# Patient Record
Sex: FEMALE | Race: WHITE | NOT HISPANIC OR LATINO | URBAN - METROPOLITAN AREA
[De-identification: names, ages, dates, MRNs, and addresses within clinical notes are randomized per-mention and may not be internally consistent; named-entity substitution may affect disease eponyms.]

---

## 2017-05-29 ENCOUNTER — EMERGENCY (EMERGENCY)
Facility: HOSPITAL | Age: 52
LOS: 1 days | End: 2017-05-29
Payer: MEDICAID

## 2017-05-29 PROCEDURE — 71275 CT ANGIOGRAPHY CHEST: CPT | Mod: 26

## 2017-05-29 PROCEDURE — 71020: CPT | Mod: 26

## 2017-05-29 PROCEDURE — 99285 EMERGENCY DEPT VISIT HI MDM: CPT

## 2018-04-01 ENCOUNTER — OUTPATIENT (OUTPATIENT)
Dept: OUTPATIENT SERVICES | Facility: HOSPITAL | Age: 53
LOS: 1 days | End: 2018-04-01
Payer: MEDICAID

## 2018-04-01 PROCEDURE — G9001: CPT

## 2018-04-17 DIAGNOSIS — R69 ILLNESS, UNSPECIFIED: ICD-10-CM

## 2020-05-19 PROBLEM — Z00.00 ENCOUNTER FOR PREVENTIVE HEALTH EXAMINATION: Status: ACTIVE | Noted: 2020-05-19

## 2020-05-20 ENCOUNTER — APPOINTMENT (OUTPATIENT)
Dept: UROLOGY | Facility: CLINIC | Age: 55
End: 2020-05-20
Payer: MEDICAID

## 2020-05-20 VITALS
RESPIRATION RATE: 15 BRPM | HEART RATE: 110 BPM | TEMPERATURE: 97.9 F | WEIGHT: 134 LBS | HEIGHT: 64 IN | SYSTOLIC BLOOD PRESSURE: 159 MMHG | DIASTOLIC BLOOD PRESSURE: 89 MMHG | BODY MASS INDEX: 22.88 KG/M2

## 2020-05-20 DIAGNOSIS — F17.200 NICOTINE DEPENDENCE, UNSPECIFIED, UNCOMPLICATED: ICD-10-CM

## 2020-05-20 DIAGNOSIS — Z80.59 FAMILY HISTORY OF MALIGNANT NEOPLASM OF OTHER URINARY TRACT ORGAN: ICD-10-CM

## 2020-05-20 DIAGNOSIS — Z83.3 FAMILY HISTORY OF DIABETES MELLITUS: ICD-10-CM

## 2020-05-20 DIAGNOSIS — Z82.49 FAMILY HISTORY OF ISCHEMIC HEART DISEASE AND OTHER DISEASES OF THE CIRCULATORY SYSTEM: ICD-10-CM

## 2020-05-20 DIAGNOSIS — R31.29 OTHER MICROSCOPIC HEMATURIA: ICD-10-CM

## 2020-05-20 LAB
BILIRUB UR QL STRIP: NORMAL
CLARITY UR: CLEAR
COLLECTION METHOD: NORMAL
GLUCOSE UR-MCNC: NORMAL
HCG UR QL: 0.2 EU/DL
HGB UR QL STRIP.AUTO: ABNORMAL
KETONES UR-MCNC: NORMAL
LEUKOCYTE ESTERASE UR QL STRIP: NORMAL
NITRITE UR QL STRIP: NORMAL
PH UR STRIP: 7
PROT UR STRIP-MCNC: NORMAL
SP GR UR STRIP: 1.01

## 2020-05-20 PROCEDURE — 99204 OFFICE O/P NEW MOD 45 MIN: CPT | Mod: 25

## 2020-05-20 PROCEDURE — 81003 URINALYSIS AUTO W/O SCOPE: CPT | Mod: QW

## 2020-05-20 RX ORDER — GUAIFENESIN 600 MG/1
600 TABLET, EXTENDED RELEASE ORAL
Qty: 20 | Refills: 0 | Status: ACTIVE | COMMUNITY
Start: 2020-05-06

## 2020-05-20 RX ORDER — TIOTROPIUM BROMIDE INHALATION SPRAY 3.12 UG/1
2.5 SPRAY, METERED RESPIRATORY (INHALATION)
Qty: 4 | Refills: 0 | Status: ACTIVE | COMMUNITY
Start: 2020-03-31

## 2020-05-20 RX ORDER — AZELASTINE HYDROCHLORIDE 137 UG/1
137 SPRAY, METERED NASAL
Qty: 30 | Refills: 0 | Status: ACTIVE | COMMUNITY
Start: 2020-01-16

## 2020-05-20 RX ORDER — FLUTICASONE PROPIONATE 50 UG/1
50 SPRAY, METERED NASAL
Qty: 16 | Refills: 0 | Status: ACTIVE | COMMUNITY
Start: 2020-01-09

## 2020-05-20 RX ORDER — NICOTINE 21 MG/24HR
14 PATCH, TRANSDERMAL 24 HOURS TRANSDERMAL
Qty: 14 | Refills: 0 | Status: ACTIVE | COMMUNITY
Start: 2020-03-12

## 2020-05-20 RX ORDER — IBUPROFEN 800 MG/1
800 TABLET, FILM COATED ORAL
Qty: 16 | Refills: 0 | Status: ACTIVE | COMMUNITY
Start: 2019-12-30

## 2020-05-20 RX ORDER — ALBUTEROL SULFATE 90 UG/1
108 (90 BASE) INHALANT RESPIRATORY (INHALATION)
Qty: 7 | Refills: 0 | Status: ACTIVE | COMMUNITY
Start: 2020-03-17

## 2020-05-20 NOTE — LETTER BODY
[Dear  ___] : Dear  [unfilled], [Please see my note below.] : Please see my note below. [Courtesy Letter:] : I had the pleasure of seeing your patient, [unfilled], in my office today. [Sincerely,] : Sincerely, [FreeTextEntry3] : Ed\par \par Victorino Richmond MD\par [facility]\par  of Urology\par [employer] [facility]\par

## 2020-05-20 NOTE — PHYSICAL EXAM
[General Appearance - Well Developed] : well developed [General Appearance - Well Nourished] : well nourished [Well Groomed] : well groomed [General Appearance - In No Acute Distress] : no acute distress [Normal Appearance] : normal appearance [Edema] : no peripheral edema [Respiration, Rhythm And Depth] : normal respiratory rhythm and effort [Exaggerated Use Of Accessory Muscles For Inspiration] : no accessory muscle use [Abdomen Tenderness] : non-tender [Costovertebral Angle Tenderness] : no ~M costovertebral angle tenderness [Abdomen Soft] : soft [Urinary Bladder Findings] : the bladder was normal on palpation [Normal Station and Gait] : the gait and station were normal for the patient's age [Oriented To Time, Place, And Person] : oriented to person, place, and time [] : no rash [No Focal Deficits] : no focal deficits [Mood] : the mood was normal [Affect] : the affect was normal [Not Anxious] : not anxious [Cervical Lymph Nodes Enlarged Posterior Bilaterally] : posterior cervical

## 2020-05-20 NOTE — ASSESSMENT
[FreeTextEntry1] : Impression:\par \par microhematuria\par recurrent UTI\par urinary frequency\par \par Plan:\par CT urogram, discussed cystoscopy but will hold for now.\par nitrofurantoin. \par follow up 2 weeks. \par probiotics\par \par

## 2020-05-20 NOTE — HISTORY OF PRESENT ILLNESS
[FreeTextEntry1] : Patient has a history of recurrent UTI.  It has been present for  5 years or so.  She has frequent and urgency during the day.  She has intense bladder pressure and flank pain which affects both sides.  She has a history of microscopic hematuria.

## 2020-05-21 LAB — URINE CYTOLOGY: NORMAL

## 2020-05-28 ENCOUNTER — OUTPATIENT (OUTPATIENT)
Dept: OUTPATIENT SERVICES | Facility: HOSPITAL | Age: 55
LOS: 1 days | End: 2020-05-28
Payer: MEDICAID

## 2020-05-28 ENCOUNTER — RESULT REVIEW (OUTPATIENT)
Age: 55
End: 2020-05-28

## 2020-05-28 ENCOUNTER — APPOINTMENT (OUTPATIENT)
Dept: CT IMAGING | Facility: CLINIC | Age: 55
End: 2020-05-28
Payer: MEDICAID

## 2020-05-28 DIAGNOSIS — R31.29 OTHER MICROSCOPIC HEMATURIA: ICD-10-CM

## 2020-05-28 PROCEDURE — 74178 CT ABD&PLV WO CNTR FLWD CNTR: CPT

## 2020-05-28 PROCEDURE — 74178 CT ABD&PLV WO CNTR FLWD CNTR: CPT | Mod: 26

## 2020-06-01 ENCOUNTER — APPOINTMENT (OUTPATIENT)
Dept: UROLOGY | Facility: CLINIC | Age: 55
End: 2020-06-01

## 2020-06-04 ENCOUNTER — APPOINTMENT (OUTPATIENT)
Dept: UROLOGY | Facility: CLINIC | Age: 55
End: 2020-06-04
Payer: MEDICAID

## 2020-06-04 VITALS — TEMPERATURE: 98 F | RESPIRATION RATE: 14 BRPM

## 2020-06-04 DIAGNOSIS — R35.0 FREQUENCY OF MICTURITION: ICD-10-CM

## 2020-06-04 LAB
BILIRUB UR QL STRIP: NORMAL
CLARITY UR: NORMAL
COLLECTION METHOD: NORMAL
GLUCOSE UR-MCNC: NORMAL
HCG UR QL: 0.2 EU/DL
HGB UR QL STRIP.AUTO: ABNORMAL
KETONES UR-MCNC: NORMAL
LEUKOCYTE ESTERASE UR QL STRIP: NORMAL
NITRITE UR QL STRIP: NORMAL
PH UR STRIP: 7
PROT UR STRIP-MCNC: NORMAL
SP GR UR STRIP: 1.02

## 2020-06-04 PROCEDURE — 81003 URINALYSIS AUTO W/O SCOPE: CPT | Mod: QW

## 2020-06-04 PROCEDURE — 99213 OFFICE O/P EST LOW 20 MIN: CPT | Mod: 25

## 2020-06-04 PROCEDURE — 51798 US URINE CAPACITY MEASURE: CPT

## 2020-06-04 RX ORDER — GENTAMICIN SULFATE 3 MG/ML
0.3 SOLUTION OPHTHALMIC
Qty: 5 | Refills: 0 | Status: DISCONTINUED | COMMUNITY
Start: 2020-04-27

## 2020-06-04 RX ORDER — PAROXETINE HYDROCHLORIDE 10 MG/1
10 TABLET, FILM COATED ORAL
Qty: 30 | Refills: 0 | Status: DISCONTINUED | COMMUNITY
Start: 2019-12-19

## 2020-06-04 RX ORDER — TOBRAMYCIN AND DEXAMETHASONE 3; 1 MG/ML; MG/ML
0.3-0.1 SUSPENSION/ DROPS OPHTHALMIC
Qty: 5 | Refills: 0 | Status: DISCONTINUED | COMMUNITY
Start: 2020-05-04

## 2020-06-04 RX ORDER — METRONIDAZOLE 7.5 MG/G
0.75 GEL VAGINAL
Qty: 70 | Refills: 0 | Status: DISCONTINUED | COMMUNITY
Start: 2020-02-27

## 2020-06-04 RX ORDER — PHENYLEPHRINE HCL 10 MG
7 TABLET ORAL
Qty: 14 | Refills: 0 | Status: DISCONTINUED | COMMUNITY
Start: 2020-03-12

## 2020-06-04 RX ORDER — LEVOFLOXACIN 500 MG/1
500 TABLET, FILM COATED ORAL
Qty: 10 | Refills: 0 | Status: DISCONTINUED | COMMUNITY
Start: 2020-04-14

## 2020-06-04 RX ORDER — FLUCONAZOLE 100 MG/1
100 TABLET ORAL
Qty: 3 | Refills: 0 | Status: DISCONTINUED | COMMUNITY
Start: 2020-02-27

## 2020-06-04 RX ORDER — SULFAMETHOXAZOLE AND TRIMETHOPRIM 800; 160 MG/1; MG/1
800-160 TABLET ORAL
Qty: 20 | Refills: 0 | Status: DISCONTINUED | COMMUNITY
Start: 2020-04-14

## 2020-06-04 RX ORDER — PREDNISONE 10 MG/1
10 TABLET ORAL
Qty: 100 | Refills: 0 | Status: DISCONTINUED | COMMUNITY
Start: 2020-01-30

## 2020-06-04 RX ORDER — UMECLIDINIUM 62.5 UG/1
62.5 AEROSOL, POWDER ORAL
Qty: 30 | Refills: 0 | Status: DISCONTINUED | COMMUNITY
Start: 2020-03-12

## 2020-06-04 NOTE — HISTORY OF PRESENT ILLNESS
[FreeTextEntry1] : Patient has strong urge to void. no dysuria or urgency. no feeling of fullness.  has been having severe frequency.  She feels empty after voiding.

## 2020-06-04 NOTE — PHYSICAL EXAM
[General Appearance - Well Nourished] : well nourished [General Appearance - Well Developed] : well developed [Normal Appearance] : normal appearance [Well Groomed] : well groomed [General Appearance - In No Acute Distress] : no acute distress [Urinary Bladder Findings] : the bladder was normal on palpation [] : no respiratory distress [Edema] : no peripheral edema [Respiration, Rhythm And Depth] : normal respiratory rhythm and effort [Exaggerated Use Of Accessory Muscles For Inspiration] : no accessory muscle use [Oriented To Time, Place, And Person] : oriented to person, place, and time [Not Anxious] : not anxious [Mood] : the mood was normal [Affect] : the affect was normal [Normal Station and Gait] : the gait and station were normal for the patient's age

## 2020-06-04 NOTE — ASSESSMENT
[FreeTextEntry1] : Impression:\par \par urinary frequency\par urgency\par PVR minimal 3 ml\par \par Plan:\par \par oxybutynin 5 mg 1-3 times daily\par follow up in one month.

## 2020-08-24 ENCOUNTER — RX RENEWAL (OUTPATIENT)
Age: 55
End: 2020-08-24

## 2021-04-27 ENCOUNTER — RESULT REVIEW (OUTPATIENT)
Age: 56
End: 2021-04-27

## 2021-05-01 ENCOUNTER — OUTPATIENT (OUTPATIENT)
Dept: OUTPATIENT SERVICES | Facility: HOSPITAL | Age: 56
LOS: 1 days | End: 2021-05-01
Payer: MEDICAID

## 2021-05-26 DIAGNOSIS — Z71.89 OTHER SPECIFIED COUNSELING: ICD-10-CM

## 2021-09-29 ENCOUNTER — OUTPATIENT (OUTPATIENT)
Dept: OUTPATIENT SERVICES | Facility: HOSPITAL | Age: 56
LOS: 1 days | Discharge: HOME | End: 2021-09-29
Payer: MEDICAID

## 2021-09-29 ENCOUNTER — RESULT REVIEW (OUTPATIENT)
Age: 56
End: 2021-09-29

## 2021-09-29 VITALS
TEMPERATURE: 98 F | HEIGHT: 63 IN | OXYGEN SATURATION: 100 % | HEART RATE: 64 BPM | DIASTOLIC BLOOD PRESSURE: 84 MMHG | SYSTOLIC BLOOD PRESSURE: 122 MMHG | RESPIRATION RATE: 18 BRPM | WEIGHT: 119.05 LBS

## 2021-09-29 DIAGNOSIS — D17.9 BENIGN LIPOMATOUS NEOPLASM, UNSPECIFIED: Chronic | ICD-10-CM

## 2021-09-29 DIAGNOSIS — K62.5 HEMORRHAGE OF ANUS AND RECTUM: ICD-10-CM

## 2021-09-29 DIAGNOSIS — Z98.891 HISTORY OF UTERINE SCAR FROM PREVIOUS SURGERY: Chronic | ICD-10-CM

## 2021-09-29 DIAGNOSIS — R10.31 RIGHT LOWER QUADRANT PAIN: ICD-10-CM

## 2021-09-29 PROCEDURE — 88313 SPECIAL STAINS GROUP 2: CPT

## 2021-09-29 PROCEDURE — 88312 SPECIAL STAINS GROUP 1: CPT

## 2021-09-29 PROCEDURE — 88313 SPECIAL STAINS GROUP 2: CPT | Mod: 26

## 2021-09-29 PROCEDURE — 88305 TISSUE EXAM BY PATHOLOGIST: CPT | Mod: 26

## 2021-09-29 PROCEDURE — 88305 TISSUE EXAM BY PATHOLOGIST: CPT

## 2021-09-29 PROCEDURE — 88312 SPECIAL STAINS GROUP 1: CPT | Mod: 26

## 2021-09-29 NOTE — ASU PATIENT PROFILE, ADULT - NSICDXPASTMEDICALHX_GEN_ALL_CORE_FT
PAST MEDICAL HISTORY:  CA skin, basal cell     CAD (coronary artery disease)     High cholesterol     Spasmodic dysphonia      PAST MEDICAL HISTORY:  Asthma     CA skin, basal cell     CAD (coronary artery disease)     High cholesterol     Spasmodic dysphonia

## 2021-09-29 NOTE — ASU PATIENT PROFILE, ADULT - NSTOBACCO QUIT READY_GEN_A_CORE_SD
cld pt to schedule an apt for referral on file, pt was not available too speak due to driving at the time of the call. Pt stated that she will call back to schedule once she is settled. No further action taken   
not motivated to quit

## 2021-10-05 DIAGNOSIS — K29.80 DUODENITIS WITHOUT BLEEDING: ICD-10-CM

## 2021-10-05 DIAGNOSIS — K21.00 GASTRO-ESOPHAGEAL REFLUX DISEASE WITH ESOPHAGITIS, WITHOUT BLEEDING: ICD-10-CM

## 2021-10-05 DIAGNOSIS — K92.1 MELENA: ICD-10-CM

## 2021-10-05 DIAGNOSIS — K31.89 OTHER DISEASES OF STOMACH AND DUODENUM: ICD-10-CM

## 2021-10-05 DIAGNOSIS — Z91.040 LATEX ALLERGY STATUS: ICD-10-CM

## 2021-10-05 DIAGNOSIS — Z88.0 ALLERGY STATUS TO PENICILLIN: ICD-10-CM

## 2021-10-05 DIAGNOSIS — E78.5 HYPERLIPIDEMIA, UNSPECIFIED: ICD-10-CM

## 2021-10-05 DIAGNOSIS — D68.2 HEREDITARY DEFICIENCY OF OTHER CLOTTING FACTORS: ICD-10-CM

## 2021-10-05 DIAGNOSIS — K64.1 SECOND DEGREE HEMORRHOIDS: ICD-10-CM

## 2021-12-01 PROCEDURE — G9005: CPT

## 2022-01-25 PROCEDURE — 99285 EMERGENCY DEPT VISIT HI MDM: CPT

## 2022-01-25 PROCEDURE — 93010 ELECTROCARDIOGRAM REPORT: CPT

## 2022-01-25 PROCEDURE — 71045 X-RAY EXAM CHEST 1 VIEW: CPT | Mod: 26

## 2022-01-25 PROCEDURE — 93970 EXTREMITY STUDY: CPT | Mod: 26

## 2022-01-26 ENCOUNTER — EMERGENCY (EMERGENCY)
Facility: HOSPITAL | Age: 57
LOS: 1 days | End: 2022-01-26
Admitting: STUDENT IN AN ORGANIZED HEALTH CARE EDUCATION/TRAINING PROGRAM
Payer: MEDICAID

## 2022-01-26 ENCOUNTER — OUTPATIENT (OUTPATIENT)
Dept: OUTPATIENT SERVICES | Facility: HOSPITAL | Age: 57
LOS: 1 days | End: 2022-01-26

## 2022-01-26 DIAGNOSIS — R22.43 LOCALIZED SWELLING, MASS AND LUMP, LOWER LIMB, BILATERAL: ICD-10-CM

## 2022-01-26 DIAGNOSIS — I25.10 ATHEROSCLEROTIC HEART DISEASE OF NATIVE CORONARY ARTERY WITHOUT ANGINA PECTORIS: ICD-10-CM

## 2022-01-26 DIAGNOSIS — Z98.891 HISTORY OF UTERINE SCAR FROM PREVIOUS SURGERY: Chronic | ICD-10-CM

## 2022-01-26 DIAGNOSIS — I45.6 PRE-EXCITATION SYNDROME: ICD-10-CM

## 2022-01-26 DIAGNOSIS — D17.9 BENIGN LIPOMATOUS NEOPLASM, UNSPECIFIED: Chronic | ICD-10-CM

## 2022-01-26 DIAGNOSIS — D68.51 ACTIVATED PROTEIN C RESISTANCE: ICD-10-CM

## 2022-01-26 DIAGNOSIS — Z86.16 PERSONAL HISTORY OF COVID-19: ICD-10-CM

## 2022-01-26 DIAGNOSIS — J44.9 CHRONIC OBSTRUCTIVE PULMONARY DISEASE, UNSPECIFIED: ICD-10-CM

## 2022-01-26 DIAGNOSIS — R06.02 SHORTNESS OF BREATH: ICD-10-CM

## 2022-01-26 DIAGNOSIS — R07.9 CHEST PAIN, UNSPECIFIED: ICD-10-CM

## 2022-01-27 PROBLEM — J38.3 OTHER DISEASES OF VOCAL CORDS: Chronic | Status: ACTIVE | Noted: 2021-09-29

## 2022-01-27 PROBLEM — E78.00 PURE HYPERCHOLESTEROLEMIA, UNSPECIFIED: Chronic | Status: ACTIVE | Noted: 2021-09-29

## 2022-01-27 PROBLEM — J45.909 UNSPECIFIED ASTHMA, UNCOMPLICATED: Chronic | Status: ACTIVE | Noted: 2021-09-29

## 2022-01-27 PROBLEM — C44.91 BASAL CELL CARCINOMA OF SKIN, UNSPECIFIED: Chronic | Status: ACTIVE | Noted: 2021-09-29

## 2022-01-27 PROBLEM — I25.10 ATHEROSCLEROTIC HEART DISEASE OF NATIVE CORONARY ARTERY WITHOUT ANGINA PECTORIS: Chronic | Status: ACTIVE | Noted: 2021-09-29

## 2022-09-28 ENCOUNTER — APPOINTMENT (OUTPATIENT)
Dept: ORTHOPEDIC SURGERY | Facility: CLINIC | Age: 57
End: 2022-09-28

## 2022-09-28 DIAGNOSIS — M54.12 RADICULOPATHY, CERVICAL REGION: ICD-10-CM

## 2022-09-28 DIAGNOSIS — M48.02 SPINAL STENOSIS, CERVICAL REGION: ICD-10-CM

## 2022-09-28 PROCEDURE — 99203 OFFICE O/P NEW LOW 30 MIN: CPT

## 2022-09-28 NOTE — HISTORY OF PRESENT ILLNESS
[de-identified] : Ms. RAY GARCIA  is a 57 year old female who presents with a chronic history of neck and bilateral hand pain since having a severe case of Lyme's disease in the 1990's.  Recently her hand pain/numbness has gotten worse and she is dropping things.  She is taking Lyrica which has not been helping.  Her bloodwork for Lyme's disease has been negative recently.   Normal bowel and bladder control.   Denies any recent fevers, chills, sweats, weight loss, or infection.\par \par The patients past medical history, past surgical history, medications, allergies, and social history were reviewed by me today with the patient and documented accordingly.  In addition, the patient's family history, which is noncontributory to their visit, was also reviewed.\par

## 2022-09-28 NOTE — PHYSICAL EXAM
[Ataxic] : not ataxic [de-identified] : Examination of the cervical spine reveals no midline or paraspinal tenderness to palpation. No cervical lymphadenopathy. Decreased range of motion with respect to flexion, extension, rotation, and lateral bending. Negative Spurlings. Negative Lhermitte's. Full range of motion bilateral shoulders without evidence of impingement. No instability of bilateral upper extremities.  Cranial nerves II through XII grossly intact. Intact sensation bilateral upper extremities. 5/5 deltoids biceps triceps wrist extensors wrist flexors finger flexors and hand intrinsics. 1+ biceps triceps and brachioradialis reflexes. Negative Davey's. 2+ radial pulse. Negative Tinel's over the cubital and carpal tunnel. No skin lesions on the right and left upper extremities. [de-identified] : Review of her cervical MRI does reveal some disc bulges with spondylosis and loss of cervical lordosis at C4-5 and C5-6

## 2022-09-28 NOTE — DISCUSSION/SUMMARY
[de-identified] : We discussed further treatment options.  We reviewed her MRI.  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  However, I am not sure if this is concordant with her symptoms.  I recommended further evaluation with a neurologist.  She will try some physical therapy as well.  Follow-up with me afterwards.

## 2022-11-08 ENCOUNTER — OFFICE (OUTPATIENT)
Dept: URBAN - METROPOLITAN AREA CLINIC 12 | Facility: CLINIC | Age: 57
Setting detail: OPHTHALMOLOGY
End: 2022-11-08
Payer: MEDICAID

## 2022-11-08 DIAGNOSIS — E11.9: ICD-10-CM

## 2022-11-08 DIAGNOSIS — H35.711: ICD-10-CM

## 2022-11-08 DIAGNOSIS — H25.13: ICD-10-CM

## 2022-11-08 DIAGNOSIS — H40.013: ICD-10-CM

## 2022-11-08 PROCEDURE — 92134 CPTRZ OPH DX IMG PST SGM RTA: CPT | Performed by: OPHTHALMOLOGY

## 2022-11-08 PROCEDURE — 92014 COMPRE OPH EXAM EST PT 1/>: CPT | Performed by: OPHTHALMOLOGY

## 2022-11-08 PROCEDURE — 92083 EXTENDED VISUAL FIELD XM: CPT | Performed by: OPHTHALMOLOGY

## 2022-11-08 ASSESSMENT — REFRACTION_CURRENTRX
OS_AXIS: 161
OS_OVR_VA: 20/
OD_AXIS: 167
OD_VPRISM_DIRECTION: SV
OS_OVR_VA: 20/
OS_VPRISM_DIRECTION: SV
OS_CYLINDER: -0.50
OD_SPHERE: +2.00
OS_SPHERE: +2.00
OD_CYLINDER: -0.25
OD_VPRISM_DIRECTION: SV
OD_OVR_VA: 20/
OS_SPHERE: +2.25
OD_OVR_VA: 20/
OD_SPHERE: +2.25
OS_VPRISM_DIRECTION: SV

## 2022-11-08 ASSESSMENT — LID POSITION - PTOSIS
OD_PTOSIS: 1+
OS_PTOSIS: 1+

## 2022-11-08 ASSESSMENT — VISUAL ACUITY
OD_BCVA: 20/30+2
OS_BCVA: 20/30+1

## 2022-11-08 ASSESSMENT — REFRACTION_AUTOREFRACTION
OD_CYLINDER: -0.50
OD_SPHERE: +1.00
OS_SPHERE: +1.00
OS_AXIS: 161
OS_CYLINDER: -0.50
OD_AXIS: 030

## 2022-11-08 ASSESSMENT — KERATOMETRY
OS_K1POWER_DIOPTERS: 46.25
OD_AXISANGLE_DEGREES: 101
OS_AXISANGLE_DEGREES: 080
OD_K1POWER_DIOPTERS: 46.00
OD_K2POWER_DIOPTERS: 46.50
OS_K2POWER_DIOPTERS: 47.25

## 2022-11-08 ASSESSMENT — CONFRONTATIONAL VISUAL FIELD TEST (CVF)
OS_FINDINGS: FULL
OD_FINDINGS: FULL

## 2022-11-08 ASSESSMENT — AXIALLENGTH_DERIVED
OD_AL: 22.3577
OS_AL: 22.194

## 2022-11-08 ASSESSMENT — SPHEQUIV_DERIVED
OD_SPHEQUIV: 0.75
OS_SPHEQUIV: 0.75

## 2022-11-08 ASSESSMENT — TONOMETRY
OD_IOP_MMHG: 11
OS_IOP_MMHG: 13

## 2023-06-30 ENCOUNTER — TRANSCRIPTION ENCOUNTER (OUTPATIENT)
Age: 58
End: 2023-06-30

## 2023-09-07 ENCOUNTER — APPOINTMENT (OUTPATIENT)
Dept: INFECTIOUS DISEASE | Facility: CLINIC | Age: 58
End: 2023-09-07
Payer: MEDICAID

## 2023-09-07 VITALS
HEIGHT: 64 IN | DIASTOLIC BLOOD PRESSURE: 84 MMHG | TEMPERATURE: 97.2 F | HEART RATE: 93 BPM | OXYGEN SATURATION: 95 % | WEIGHT: 114 LBS | BODY MASS INDEX: 19.46 KG/M2 | SYSTOLIC BLOOD PRESSURE: 130 MMHG

## 2023-09-07 DIAGNOSIS — R50.9 FEVER, UNSPECIFIED: ICD-10-CM

## 2023-09-07 DIAGNOSIS — L27.0 GENERALIZED SKIN ERUPTION DUE TO DRUGS AND MEDICAMENTS TAKEN INTERNALLY: ICD-10-CM

## 2023-09-07 DIAGNOSIS — N39.0 URINARY TRACT INFECTION, SITE NOT SPECIFIED: ICD-10-CM

## 2023-09-07 DIAGNOSIS — T36.1X5A GENERALIZED SKIN ERUPTION DUE TO DRUGS AND MEDICAMENTS TAKEN INTERNALLY: ICD-10-CM

## 2023-09-07 PROCEDURE — 99214 OFFICE O/P EST MOD 30 MIN: CPT

## 2023-09-07 RX ORDER — OXYBUTYNIN CHLORIDE 5 MG/1
5 TABLET ORAL
Qty: 90 | Refills: 1 | Status: DISCONTINUED | COMMUNITY
Start: 2020-06-04 | End: 2023-09-07

## 2023-09-07 RX ORDER — CIPROFLOXACIN HYDROCHLORIDE 500 MG/1
500 TABLET, FILM COATED ORAL
Qty: 14 | Refills: 0 | Status: ACTIVE | COMMUNITY
Start: 2023-09-07 | End: 1900-01-01

## 2023-09-07 RX ORDER — FLUCONAZOLE 150 MG/1
150 TABLET ORAL
Qty: 2 | Refills: 0 | Status: DISCONTINUED | COMMUNITY
Start: 2020-05-20 | End: 2023-09-07

## 2023-09-07 RX ORDER — MONTELUKAST 10 MG/1
10 TABLET, FILM COATED ORAL
Qty: 30 | Refills: 0 | Status: DISCONTINUED | COMMUNITY
Start: 2020-01-12 | End: 2023-09-07

## 2023-09-07 RX ORDER — NITROFURANTOIN MACROCRYSTALS 100 MG/1
100 CAPSULE ORAL
Qty: 30 | Refills: 2 | Status: DISCONTINUED | COMMUNITY
Start: 2020-05-20 | End: 2023-09-07

## 2023-09-07 RX ORDER — BUDESONIDE AND FORMOTEROL FUMARATE DIHYDRATE 160; 4.5 UG/1; UG/1
160-4.5 AEROSOL RESPIRATORY (INHALATION)
Qty: 31 | Refills: 0 | Status: DISCONTINUED | COMMUNITY
Start: 2020-04-30 | End: 2023-09-07

## 2023-09-07 RX ORDER — ALBUTEROL SULFATE 2.5 MG/3ML
(2.5 MG/3ML) SOLUTION RESPIRATORY (INHALATION)
Qty: 150 | Refills: 0 | Status: DISCONTINUED | COMMUNITY
Start: 2020-01-30 | End: 2023-09-07

## 2023-09-07 RX ORDER — QUETIAPINE FUMARATE 50 MG/1
50 TABLET ORAL
Qty: 30 | Refills: 0 | Status: DISCONTINUED | COMMUNITY
Start: 2020-05-04 | End: 2023-09-07

## 2023-09-07 RX ORDER — EZETIMIBE 10 MG/1
10 TABLET ORAL
Refills: 0 | Status: ACTIVE | COMMUNITY

## 2023-09-07 RX ORDER — BUSPIRONE HYDROCHLORIDE 10 MG/1
10 TABLET ORAL
Qty: 30 | Refills: 0 | Status: DISCONTINUED | COMMUNITY
Start: 2020-05-01 | End: 2023-09-07

## 2023-09-07 RX ORDER — CIPROFLOXACIN HYDROCHLORIDE 500 MG/1
500 TABLET, FILM COATED ORAL
Qty: 7 | Refills: 0 | Status: ACTIVE | COMMUNITY
Start: 2023-09-07 | End: 1900-01-01

## 2023-09-07 RX ORDER — FLUTICASONE FUROATE AND VILANTEROL TRIFENATATE 100; 25 UG/1; UG/1
100-25 POWDER RESPIRATORY (INHALATION)
Qty: 60 | Refills: 0 | Status: DISCONTINUED | COMMUNITY
Start: 2020-01-16 | End: 2023-09-07

## 2023-09-07 NOTE — ASSESSMENT
[FreeTextEntry1] : This 58-year-old woman who has been treated in the past for recurrent UTIs, also for a kidney infection with E. coli.  She has history of cephalosporin allergies, was able to tolerate ceftriaxone in the hospital and at the home.  She told me that after finishing the course of ceftriaxone earlier this year, she had a rash 1 week later.  At the current time, her fevers are suggestive for recurrent UTI.  I have ordered some labs.  Including a urine analysis with reflex urine culture.  I have also ordered her a course of ciprofloxacin which was sent to her pharmacy.   [Treatment Education] : treatment education [Treatment Adherence] : treatment adherence [Risk Reduction] : risk reduction

## 2023-09-07 NOTE — PHYSICAL EXAM
[General Appearance - Alert] : alert [General Appearance - In No Acute Distress] : in no acute distress [Sclera] : the sclera and conjunctiva were normal [PERRL With Normal Accommodation] : pupils were equal in size, round, reactive to light [Extraocular Movements] : extraocular movements were intact [Outer Ear] : the ears and nose were normal in appearance [Oropharynx] : the oropharynx was normal with no thrush [Neck Appearance] : the appearance of the neck was normal [Neck Cervical Mass (___cm)] : no neck mass was observed [Jugular Venous Distention Increased] : there was no jugular-venous distention [Thyroid Diffuse Enlargement] : the thyroid was not enlarged [Auscultation Breath Sounds / Voice Sounds] : lungs were clear to auscultation bilaterally [Heart Rate And Rhythm] : heart rate was normal and rhythm regular [Heart Sounds] : normal S1 and S2 [Heart Sounds Gallop] : no gallops [Murmurs] : no murmurs [Heart Sounds Pericardial Friction Rub] : no pericardial rub [Full Pulse] : the pedal pulses are present [Edema] : there was no peripheral edema [Bowel Sounds] : normal bowel sounds [Abdomen Soft] : soft [Abdomen Tenderness] : non-tender [Abdomen Mass (___ Cm)] : no abdominal mass palpated [Costovertebral Angle Tenderness] : no CVA tenderness [FreeTextEntry1] : No CVA tenderness noted [No Palpable Adenopathy] : no palpable adenopathy [Musculoskeletal - Swelling] : no joint swelling [Nail Clubbing] : no clubbing  or cyanosis of the fingernails [Motor Tone] : muscle strength and tone were normal [Skin Color & Pigmentation] : normal skin color and pigmentation [] : no rash [Cranial Nerves] : cranial nerves 2-12 were intact [Sensation] : the sensory exam was normal to light touch and pinprick [No Focal Deficits] : no focal deficits [Oriented To Time, Place, And Person] : oriented to person, place, and time [Affect] : the affect was normal

## 2023-09-07 NOTE — REVIEW OF SYSTEMS
[Fever] : fever [Feeling Tired] : feeling tired [Feeling Sick] : feeling sick [As Noted in HPI] : as noted in HPI [Dysuria] : dysuria [Negative] : Heme/Lymph

## 2023-09-07 NOTE — HISTORY OF PRESENT ILLNESS
[FreeTextEntry1] : This is a 58-year-old woman that was previously seen in the hospital for left pyelonephritis and history of tick bite.  She was admitted twice, most recently admitted from June 26 until July 3, 2023.  Blood cultures from Kaela 20 2023x2 were negative.  She still some left flank pain.  She had a placement of a midline for a course of 2 g every 24 hours.  Per the last note on July 3, 2023, she was to complete the course of antibiotics through July 10, 2023.  For 14 days total.  She had E. coli bacteremia on June 26, 2023 it was susceptible to all tested agents including Cipro and Levaquin.  Is here because she reports having fevers.  She has a temperature of 102.9, then on repeat is 94.  She reports feeling very weak.  She has trouble walking across the room.  She does not feel well.  She if possible does not want to go back to the hospital.   She reports darker urine, and also some foul-smelling urine

## 2023-09-08 ENCOUNTER — LABORATORY RESULT (OUTPATIENT)
Age: 58
End: 2023-09-08

## 2023-09-12 LAB
ALBUMIN SERPL ELPH-MCNC: 5.2 G/DL
ALP BLD-CCNC: 93 U/L
ALT SERPL-CCNC: 29 U/L
ANION GAP SERPL CALC-SCNC: 16 MMOL/L
APPEARANCE: ABNORMAL
AST SERPL-CCNC: 66 U/L
BASOPHILS # BLD AUTO: 0.1 K/UL
BASOPHILS NFR BLD AUTO: 1.8 %
BILIRUB SERPL-MCNC: 1 MG/DL
BILIRUBIN URINE: NEGATIVE
BLOOD URINE: NEGATIVE
BUN SERPL-MCNC: 11 MG/DL
CALCIUM SERPL-MCNC: 10.5 MG/DL
CHLORIDE SERPL-SCNC: 102 MMOL/L
CO2 SERPL-SCNC: 24 MMOL/L
COLOR: NORMAL
CREAT SERPL-MCNC: 0.69 MG/DL
EGFR: 102 ML/MIN/1.73M2
EOSINOPHIL # BLD AUTO: 0.14 K/UL
EOSINOPHIL NFR BLD AUTO: 2.7 %
GLUCOSE QUALITATIVE U: NEGATIVE MG/DL
GLUCOSE SERPL-MCNC: 79 MG/DL
HCT VFR BLD CALC: 46.3 %
HGB BLD-MCNC: 15 G/DL
KETONES URINE: NEGATIVE MG/DL
LEUKOCYTE ESTERASE URINE: NEGATIVE
LYMPHOCYTES # BLD AUTO: 1.57 K/UL
LYMPHOCYTES NFR BLD AUTO: 29.7 %
MAN DIFF?: NORMAL
MCHC RBC-ENTMCNC: 32.4 GM/DL
MCHC RBC-ENTMCNC: 35.7 PG
MCV RBC AUTO: 110.2 FL
MONOCYTES # BLD AUTO: 0.38 K/UL
MONOCYTES NFR BLD AUTO: 7.2 %
NEUTROPHILS # BLD AUTO: 3.05 K/UL
NEUTROPHILS NFR BLD AUTO: 57.7 %
NITRITE URINE: NEGATIVE
PH URINE: 7
PLATELET # BLD AUTO: 220 K/UL
POTASSIUM SERPL-SCNC: 3.7 MMOL/L
PROT SERPL-MCNC: 8 G/DL
PROTEIN URINE: 30 MG/DL
RBC # BLD: 4.2 M/UL
RBC # FLD: 16.3 %
SODIUM SERPL-SCNC: 143 MMOL/L
SPECIFIC GRAVITY URINE: 1.02
UROBILINOGEN URINE: 0.2 MG/DL
WBC # FLD AUTO: 5.28 K/UL

## 2023-09-12 RX ORDER — LINEZOLID 600 MG/1
600 TABLET, FILM COATED ORAL
Qty: 14 | Refills: 0 | Status: ACTIVE | COMMUNITY
Start: 2023-09-12 | End: 1900-01-01

## 2023-09-21 NOTE — EEG REPORT - NS EEG TEXT BOX
RAY GARCIA [id number]     Study Date: 		09-21-23    --------------------------------------------------------------------------------------------------  History:  CC/ HPI Patient is a 58y old  Female   --------------------------------------------------------------------------------------------------  Study Interpretation:    [Abbreviation Key:  PDR=alpha rhythm/posterior dominant rhythm. A-P=anterior posterior.  Amplitude: ‘very low’:<20; ‘low’:20-49; ‘medium’:; ‘high’:>150uV.  Persistence for periodic/rhythmic patterns (% of epoch) ‘rare’:<1%; ‘occasional’:1-10%; ‘frequent’:10-50%; ‘abundant’:50-90%; ‘continuous’:>90%.  Persistence for sporadic discharges: ‘rare’:<1/hr; ‘occasional’:1/min-1/hr; ‘frequent’:>1/min; ‘abundant’:>1/10 sec.  RPP=rhythmic and periodic patterns; GRDA=generalized rhythmic delta activity; FIRDA=frontal intermittent GRDA; LRDA=lateralized rhythmic delta activity; TIRDA=temporal intermittent rhythmic delta activity;  LPD=PLED=lateralized periodic discharges; GPD=generalized periodic discharges; BIPDs =bilateral independent periodic discharges; Mf=multifocal; SIRPDs=stimulus induced rhythmic, periodic, or ictal appearing discharges; BIRDs=brief potentially ictal rhythmic discharges >4 Hz, lasting .5-10s; PFA (paroxysmal bursts >13 Hz or =8 Hz <10s).  Modifiers: +F=with fast component; +S=with spike component; +R=with rhythmic component.  S-B=burst suppression pattern.  Max=maximal. N1-drowsy; N2-stage II sleep; N3-slow wave sleep. SSS/BETS=small sharp spikes/benign epileptiform transients of sleep. HV=hyperventilation; PS=photic stimulation]    FINDINGS:      Background:  Continuity: continuous  Symmetry: symmetric  PDR:8.5 Hz activity, with amplitude to 40 uV, that attenuated to eye opening.    Reactivity: present  Voltage: normal (between 20-150uV)  Anterior Posterior Gradient: present  Other background findings: none  Breach: absent    Background Slowing:  Generalized slowing: diffuse irregular delta and theta activity.  Focal slowing: none was present.    State Changes:   -Drowsiness noted with increased slowing, attenuation of fast activity, vertex transients.  -Present with N2 sleep transients with symmetric spindles and K-complexes.    Sporadic Epileptiform Discharges:    None    Rhythmic and Periodic Patterns (RPPs):  None     Electrographic and Electroclinical seizures:  None    Other Clinical Events:  None    Activation Procedures:   -Hyperventilation was not performed.    -Photic stimulation was performed and did not elicit any abnormalities.       Artifacts:  Intermittent myogenic and movement artifacts were noted.    ECG:  The heart rate on single channel ECG was predominantly between 60-70 BPM.    EEG Classification / Summary:  Abnormal EEG study  Mild generalized background slowing    -----------------------------------------------------------------------------------------------------    Clinical Impression:  Mild diffuse/multi-focal cerebral dysfunction, not specific as to etiology.  There were no epileptiform abnormalities recorded.      -------------------------------------------------------------------------------------------------------  [facility] EEG Reading Room Ph#: [phone]  Epilepsy Answering Service after 5PM and before 8:30AM: Ph#: [phone]    Romero Ramirez M.D.   of Neurology, [facility] [place]

## 2023-12-20 ENCOUNTER — OFFICE (OUTPATIENT)
Dept: URBAN - METROPOLITAN AREA CLINIC 12 | Facility: CLINIC | Age: 58
Setting detail: OPHTHALMOLOGY
End: 2023-12-20
Payer: MEDICAID

## 2023-12-20 DIAGNOSIS — H25.13: ICD-10-CM

## 2023-12-20 DIAGNOSIS — H43.393: ICD-10-CM

## 2023-12-20 DIAGNOSIS — H18.413: ICD-10-CM

## 2023-12-20 DIAGNOSIS — H04.123: ICD-10-CM

## 2023-12-20 DIAGNOSIS — H35.711: ICD-10-CM

## 2023-12-20 DIAGNOSIS — H40.013: ICD-10-CM

## 2023-12-20 DIAGNOSIS — E11.9: ICD-10-CM

## 2023-12-20 PROCEDURE — 92250 FUNDUS PHOTOGRAPHY W/I&R: CPT | Performed by: OPHTHALMOLOGY

## 2023-12-20 PROCEDURE — 92014 COMPRE OPH EXAM EST PT 1/>: CPT | Performed by: OPHTHALMOLOGY

## 2023-12-20 ASSESSMENT — SPHEQUIV_DERIVED
OD_SPHEQUIV: 0.125
OS_SPHEQUIV: 0.5

## 2023-12-20 ASSESSMENT — REFRACTION_CURRENTRX
OS_AXIS: 168
OD_VPRISM_DIRECTION: SV
OS_ADD: +2.50
OD_VPRISM_DIRECTION: SV
OD_OVR_VA: 20/
OS_SPHERE: +0.75
OD_SPHERE: +0.50
OS_OVR_VA: 20/
OD_AXIS: 029
OD_CYLINDER: -0.25
OD_AXIS: 028
OD_OVR_VA: 20/
OD_VPRISM_DIRECTION: SV
OS_AXIS: 166
OD_OVR_VA: 20/
OS_OVR_VA: 20/
OS_CYLINDER: -0.50
OS_SPHERE: +3.00
OD_ADD: +2.50
OD_CYLINDER: -0.25
OS_CYLINDER: -0.50
OS_OVR_VA: 20/
OS_VPRISM_DIRECTION: SV
OD_SPHERE: +2.75
OS_VPRISM_DIRECTION: SV
OS_VPRISM_DIRECTION: SV

## 2023-12-20 ASSESSMENT — CONFRONTATIONAL VISUAL FIELD TEST (CVF)
OD_FINDINGS: FULL
OS_FINDINGS: FULL

## 2023-12-20 ASSESSMENT — REFRACTION_AUTOREFRACTION
OS_AXIS: 153
OD_AXIS: 070
OD_SPHERE: +0.25
OS_SPHERE: +0.75
OS_CYLINDER: -0.50
OD_CYLINDER: -0.25

## 2023-12-20 ASSESSMENT — LID POSITION - PTOSIS
OS_PTOSIS: 1+
OD_PTOSIS: 1+

## 2024-01-25 ENCOUNTER — INPATIENT (INPATIENT)
Facility: HOSPITAL | Age: 59
LOS: 2 days | Discharge: HOME HEALTH | DRG: 48 | End: 2024-01-28
Attending: FAMILY MEDICINE | Admitting: STUDENT IN AN ORGANIZED HEALTH CARE EDUCATION/TRAINING PROGRAM
Payer: MEDICAID

## 2024-01-25 VITALS — WEIGHT: 113.98 LBS

## 2024-01-25 DIAGNOSIS — Z98.891 HISTORY OF UTERINE SCAR FROM PREVIOUS SURGERY: Chronic | ICD-10-CM

## 2024-01-25 DIAGNOSIS — I63.9 CEREBRAL INFARCTION, UNSPECIFIED: ICD-10-CM

## 2024-01-25 DIAGNOSIS — D17.9 BENIGN LIPOMATOUS NEOPLASM, UNSPECIFIED: Chronic | ICD-10-CM

## 2024-01-25 LAB
ALBUMIN SERPL ELPH-MCNC: 3.7 G/DL — SIGNIFICANT CHANGE UP (ref 3.3–5)
ALP SERPL-CCNC: 85 U/L — SIGNIFICANT CHANGE UP (ref 40–120)
ALT FLD-CCNC: 63 U/L — SIGNIFICANT CHANGE UP (ref 12–78)
ANION GAP SERPL CALC-SCNC: 5 MMOL/L — SIGNIFICANT CHANGE UP (ref 5–17)
APPEARANCE UR: CLEAR — SIGNIFICANT CHANGE UP
APTT BLD: 29.4 SEC — SIGNIFICANT CHANGE UP (ref 24.5–35.6)
AST SERPL-CCNC: 109 U/L — HIGH (ref 15–37)
BASOPHILS # BLD AUTO: 0.13 K/UL — SIGNIFICANT CHANGE UP (ref 0–0.2)
BASOPHILS NFR BLD AUTO: 1.9 % — SIGNIFICANT CHANGE UP (ref 0–2)
BILIRUB SERPL-MCNC: 0.8 MG/DL — SIGNIFICANT CHANGE UP (ref 0.2–1.2)
BILIRUB UR-MCNC: NEGATIVE — SIGNIFICANT CHANGE UP
BUN SERPL-MCNC: 9 MG/DL — SIGNIFICANT CHANGE UP (ref 7–23)
CALCIUM SERPL-MCNC: 9.3 MG/DL — SIGNIFICANT CHANGE UP (ref 8.5–10.1)
CHLORIDE SERPL-SCNC: 110 MMOL/L — HIGH (ref 96–108)
CO2 SERPL-SCNC: 26 MMOL/L — SIGNIFICANT CHANGE UP (ref 22–31)
COLOR SPEC: YELLOW — SIGNIFICANT CHANGE UP
CREAT SERPL-MCNC: 0.7 MG/DL — SIGNIFICANT CHANGE UP (ref 0.5–1.3)
D DIMER BLD IA.RAPID-MCNC: <150 NG/ML DDU — SIGNIFICANT CHANGE UP
DIFF PNL FLD: NEGATIVE — SIGNIFICANT CHANGE UP
EGFR: 101 ML/MIN/1.73M2 — SIGNIFICANT CHANGE UP
EOSINOPHIL # BLD AUTO: 0.2 K/UL — SIGNIFICANT CHANGE UP (ref 0–0.5)
EOSINOPHIL NFR BLD AUTO: 2.9 % — SIGNIFICANT CHANGE UP (ref 0–6)
FLUAV AG NPH QL: SIGNIFICANT CHANGE UP
FLUBV AG NPH QL: SIGNIFICANT CHANGE UP
GLUCOSE SERPL-MCNC: 90 MG/DL — SIGNIFICANT CHANGE UP (ref 70–99)
GLUCOSE UR QL: NEGATIVE MG/DL — SIGNIFICANT CHANGE UP
HCT VFR BLD CALC: 39.7 % — SIGNIFICANT CHANGE UP (ref 34.5–45)
HGB BLD-MCNC: 14.4 G/DL — SIGNIFICANT CHANGE UP (ref 11.5–15.5)
IMM GRANULOCYTES NFR BLD AUTO: 0.3 % — SIGNIFICANT CHANGE UP (ref 0–0.9)
INR BLD: 0.95 RATIO — SIGNIFICANT CHANGE UP (ref 0.85–1.18)
KETONES UR-MCNC: NEGATIVE MG/DL — SIGNIFICANT CHANGE UP
LEUKOCYTE ESTERASE UR-ACNC: NEGATIVE — SIGNIFICANT CHANGE UP
LYMPHOCYTES # BLD AUTO: 2.35 K/UL — SIGNIFICANT CHANGE UP (ref 1–3.3)
LYMPHOCYTES # BLD AUTO: 34.5 % — SIGNIFICANT CHANGE UP (ref 13–44)
MAGNESIUM SERPL-MCNC: 2.4 MG/DL — SIGNIFICANT CHANGE UP (ref 1.6–2.6)
MCHC RBC-ENTMCNC: 35.3 PG — HIGH (ref 27–34)
MCHC RBC-ENTMCNC: 36.3 GM/DL — HIGH (ref 32–36)
MCV RBC AUTO: 97.3 FL — SIGNIFICANT CHANGE UP (ref 80–100)
MONOCYTES # BLD AUTO: 0.6 K/UL — SIGNIFICANT CHANGE UP (ref 0–0.9)
MONOCYTES NFR BLD AUTO: 8.8 % — SIGNIFICANT CHANGE UP (ref 2–14)
NEUTROPHILS # BLD AUTO: 3.52 K/UL — SIGNIFICANT CHANGE UP (ref 1.8–7.4)
NEUTROPHILS NFR BLD AUTO: 51.6 % — SIGNIFICANT CHANGE UP (ref 43–77)
NITRITE UR-MCNC: NEGATIVE — SIGNIFICANT CHANGE UP
PH UR: 7 — SIGNIFICANT CHANGE UP (ref 5–8)
PLATELET # BLD AUTO: 167 K/UL — SIGNIFICANT CHANGE UP (ref 150–400)
POTASSIUM SERPL-MCNC: 4.1 MMOL/L — SIGNIFICANT CHANGE UP (ref 3.5–5.3)
POTASSIUM SERPL-SCNC: 4.1 MMOL/L — SIGNIFICANT CHANGE UP (ref 3.5–5.3)
PROT SERPL-MCNC: 7.2 GM/DL — SIGNIFICANT CHANGE UP (ref 6–8.3)
PROT UR-MCNC: NEGATIVE MG/DL — SIGNIFICANT CHANGE UP
PROTHROM AB SERPL-ACNC: 10.7 SEC — SIGNIFICANT CHANGE UP (ref 9.5–13)
RBC # BLD: 4.08 M/UL — SIGNIFICANT CHANGE UP (ref 3.8–5.2)
RBC # FLD: 12.3 % — SIGNIFICANT CHANGE UP (ref 10.3–14.5)
RSV RNA NPH QL NAA+NON-PROBE: SIGNIFICANT CHANGE UP
SARS-COV-2 RNA SPEC QL NAA+PROBE: SIGNIFICANT CHANGE UP
SODIUM SERPL-SCNC: 141 MMOL/L — SIGNIFICANT CHANGE UP (ref 135–145)
SP GR SPEC: <1.005 — LOW (ref 1–1.03)
TROPONIN I, HIGH SENSITIVITY RESULT: 5.4 NG/L — SIGNIFICANT CHANGE UP
TSH SERPL-MCNC: 0.74 UU/ML — SIGNIFICANT CHANGE UP (ref 0.34–4.82)
UROBILINOGEN FLD QL: 0.2 MG/DL — SIGNIFICANT CHANGE UP (ref 0.2–1)
WBC # BLD: 6.82 K/UL — SIGNIFICANT CHANGE UP (ref 3.8–10.5)
WBC # FLD AUTO: 6.82 K/UL — SIGNIFICANT CHANGE UP (ref 3.8–10.5)

## 2024-01-25 PROCEDURE — 86618 LYME DISEASE ANTIBODY: CPT

## 2024-01-25 PROCEDURE — 84484 ASSAY OF TROPONIN QUANT: CPT

## 2024-01-25 PROCEDURE — 82607 VITAMIN B-12: CPT

## 2024-01-25 PROCEDURE — 86038 ANTINUCLEAR ANTIBODIES: CPT

## 2024-01-25 PROCEDURE — 80061 LIPID PANEL: CPT

## 2024-01-25 PROCEDURE — 92523 SPEECH SOUND LANG COMPREHEN: CPT | Mod: GN

## 2024-01-25 PROCEDURE — 82652 VIT D 1 25-DIHYDROXY: CPT

## 2024-01-25 PROCEDURE — 84443 ASSAY THYROID STIM HORMONE: CPT

## 2024-01-25 PROCEDURE — 86789 WEST NILE VIRUS ANTIBODY: CPT

## 2024-01-25 PROCEDURE — 93010 ELECTROCARDIOGRAM REPORT: CPT

## 2024-01-25 PROCEDURE — 70498 CT ANGIOGRAPHY NECK: CPT | Mod: 26,MA

## 2024-01-25 PROCEDURE — 0042T: CPT

## 2024-01-25 PROCEDURE — 82306 VITAMIN D 25 HYDROXY: CPT

## 2024-01-25 PROCEDURE — 85025 COMPLETE CBC W/AUTO DIFF WBC: CPT

## 2024-01-25 PROCEDURE — 72156 MRI NECK SPINE W/O & W/DYE: CPT

## 2024-01-25 PROCEDURE — 82746 ASSAY OF FOLIC ACID SERUM: CPT

## 2024-01-25 PROCEDURE — 85652 RBC SED RATE AUTOMATED: CPT

## 2024-01-25 PROCEDURE — 80076 HEPATIC FUNCTION PANEL: CPT

## 2024-01-25 PROCEDURE — 87468 ANAPLSMA PHGCYTOPHLM AMP PRB: CPT

## 2024-01-25 PROCEDURE — 93880 EXTRACRANIAL BILAT STUDY: CPT

## 2024-01-25 PROCEDURE — 36415 COLL VENOUS BLD VENIPUNCTURE: CPT

## 2024-01-25 PROCEDURE — 86757 RICKETTSIA ANTIBODY: CPT

## 2024-01-25 PROCEDURE — A9579: CPT

## 2024-01-25 PROCEDURE — 80074 ACUTE HEPATITIS PANEL: CPT

## 2024-01-25 PROCEDURE — 72158 MRI LUMBAR SPINE W/O & W/DYE: CPT

## 2024-01-25 PROCEDURE — 86140 C-REACTIVE PROTEIN: CPT

## 2024-01-25 PROCEDURE — 86788 WEST NILE VIRUS AB IGM: CPT

## 2024-01-25 PROCEDURE — 72157 MRI CHEST SPINE W/O & W/DYE: CPT

## 2024-01-25 PROCEDURE — 97166 OT EVAL MOD COMPLEX 45 MIN: CPT | Mod: GO

## 2024-01-25 PROCEDURE — 92610 EVALUATE SWALLOWING FUNCTION: CPT | Mod: GN

## 2024-01-25 PROCEDURE — 82550 ASSAY OF CK (CPK): CPT

## 2024-01-25 PROCEDURE — 70551 MRI BRAIN STEM W/O DYE: CPT

## 2024-01-25 PROCEDURE — 83880 ASSAY OF NATRIURETIC PEPTIDE: CPT

## 2024-01-25 PROCEDURE — 83036 HEMOGLOBIN GLYCOSYLATED A1C: CPT

## 2024-01-25 PROCEDURE — 87484 EHRLICHA CHAFFEENSIS AMP PRB: CPT

## 2024-01-25 PROCEDURE — 70496 CT ANGIOGRAPHY HEAD: CPT | Mod: 26,MA

## 2024-01-25 PROCEDURE — 93306 TTE W/DOPPLER COMPLETE: CPT

## 2024-01-25 PROCEDURE — 99285 EMERGENCY DEPT VISIT HI MDM: CPT

## 2024-01-25 PROCEDURE — 84145 PROCALCITONIN (PCT): CPT

## 2024-01-25 PROCEDURE — 97161 PT EVAL LOW COMPLEX 20 MIN: CPT | Mod: GP

## 2024-01-25 PROCEDURE — 94640 AIRWAY INHALATION TREATMENT: CPT

## 2024-01-25 PROCEDURE — 80048 BASIC METABOLIC PNL TOTAL CA: CPT

## 2024-01-25 PROCEDURE — 99223 1ST HOSP IP/OBS HIGH 75: CPT

## 2024-01-25 PROCEDURE — 87798 DETECT AGENT NOS DNA AMP: CPT

## 2024-01-25 PROCEDURE — 71045 X-RAY EXAM CHEST 1 VIEW: CPT | Mod: 26

## 2024-01-25 RX ORDER — ASPIRIN/CALCIUM CARB/MAGNESIUM 324 MG
324 TABLET ORAL ONCE
Refills: 0 | Status: COMPLETED | OUTPATIENT
Start: 2024-01-25 | End: 2024-01-25

## 2024-01-25 RX ORDER — METOPROLOL TARTRATE 50 MG
5 TABLET ORAL ONCE
Refills: 0 | Status: COMPLETED | OUTPATIENT
Start: 2024-01-25 | End: 2024-01-25

## 2024-01-25 RX ORDER — CYCLOBENZAPRINE HYDROCHLORIDE 10 MG/1
1 TABLET, FILM COATED ORAL
Refills: 0 | DISCHARGE

## 2024-01-25 RX ORDER — TIOTROPIUM BROMIDE 18 UG/1
0 CAPSULE ORAL; RESPIRATORY (INHALATION)
Qty: 0 | Refills: 0 | DISCHARGE

## 2024-01-25 RX ORDER — ALBUTEROL 90 UG/1
3 AEROSOL, METERED ORAL
Refills: 0 | DISCHARGE

## 2024-01-25 RX ORDER — AMLODIPINE BESYLATE 2.5 MG/1
0 TABLET ORAL
Qty: 0 | Refills: 0 | DISCHARGE

## 2024-01-25 RX ORDER — ASPIRIN/CALCIUM CARB/MAGNESIUM 324 MG
1 TABLET ORAL
Refills: 0 | DISCHARGE

## 2024-01-25 RX ORDER — ALBUTEROL 90 UG/1
0 AEROSOL, METERED ORAL
Qty: 0 | Refills: 0 | DISCHARGE

## 2024-01-25 RX ORDER — CLONAZEPAM 1 MG
1 TABLET ORAL
Refills: 0 | DISCHARGE

## 2024-01-25 RX ORDER — TIOTROPIUM BROMIDE 18 UG/1
2 CAPSULE ORAL; RESPIRATORY (INHALATION)
Refills: 0 | DISCHARGE

## 2024-01-25 RX ORDER — LORATADINE 10 MG/1
0 TABLET ORAL
Qty: 0 | Refills: 0 | DISCHARGE

## 2024-01-25 RX ORDER — DIAZEPAM 5 MG
1 TABLET ORAL
Refills: 0 | DISCHARGE

## 2024-01-25 RX ORDER — CARVEDILOL PHOSPHATE 80 MG/1
1 CAPSULE, EXTENDED RELEASE ORAL
Refills: 0 | DISCHARGE

## 2024-01-25 RX ADMIN — Medication 324 MILLIGRAM(S): at 19:01

## 2024-01-25 RX ADMIN — Medication 5 MILLIGRAM(S): at 19:05

## 2024-01-25 NOTE — ED PROVIDER NOTE - CLINICAL SUMMARY MEDICAL DECISION MAKING FREE TEXT BOX
50 58-year-old patient presents emerged department with increasing numbness and tingling left side of body.  Patient states she has episodes of difficulty walking and difficulty holding things the left side.  Patient states that she has had the symptoms since her diagnosis of Lyme and now worse over the last 2 days.

## 2024-01-25 NOTE — ED PROVIDER NOTE - OBJECTIVE STATEMENT
58-year-old patient with past medical history for  coronary artery disease, Lyme disease, WPW status post ablation presents emergency department for left-sided numbness   And confusion.  Patient initially reports that she has been having chest discomfort left-sided numbness for the last several weeks.  However upon further questioning patient now states that the symptoms have been  becoming worse over the last 2 days.  Patient went and saw cardiology yesterday and recommended to go to the ER for further evaluation.

## 2024-01-25 NOTE — H&P ADULT - ASSESSMENT
A/P:    1.  left sided numbness  Acute CVA  ?? left sided weakness  -follow clinically in Telemetry unit with neurochecks  -follow echo  -follow MRI brain  -follow labs  -on aspirin and statin  -follow Neurology consult  -follow PT/OT/Speech eval    2.  Chest pain  -troponin -neg  -no acute EKG change  -no active chest pain now  -follow echo  -follow cardiology consult    3.  SCD for DVT ppx    4.  Code status  -full code

## 2024-01-25 NOTE — ED ADULT NURSE NOTE - OBJECTIVE STATEMENT
Pt arrives to ED c/o left sided 'head and neck' pressure which has progressively gotten worse for the past couple of months. Pt states since she had 'sepsis' 7 months ago she has had tingling in her arms and legs. Pt is AAO x4, VSS. Pt arrives to ED c/o left sided 'head and neck' pressure and tingling which has progressively gotten worse for the past couple of months. Pt advised by PCP to come to ED to be evaluated. describes head and neck pressure as 'uncomfortable'. No changes in vision, pt sensory intact. Pt is AAO x4, VSS.

## 2024-01-25 NOTE — PATIENT PROFILE ADULT - NSPROPTRIGHTCAREGIVER_GEN_A_NUR
no PAST SURGICAL HISTORY:  H/O colonoscopy with polypectomy 7/30/19, 1 benign polyp    History of lumbar laminectomy for spinal cord decompression Novemeber 2018    History of lumbar spinal fusion May 2019 L4-5    History of lumbar spinal fusion 1/2018 L5-S1    S/P excision of ganglion cyst left >10 years ago    S/P UPPP (uvulopalatopharyngoplasty) 2009

## 2024-01-25 NOTE — ED ADULT NURSE REASSESSMENT NOTE - NS ED NURSE REASSESS COMMENT FT1
Pt. received from previous RN in stable condition, no s/sx of resp. distress/pain.  Able to make all needs known, VSS, safety maintained, and emotional support provided. Neuro checks in progress. Call bell within reach, will continue to monitor.

## 2024-01-25 NOTE — ED PROVIDER NOTE - NEUROLOGICAL, MLM
Alert and oriented, no focal deficits, no motor deficits 5/5/ strength, pt with numbness to left face, arm and leg.

## 2024-01-25 NOTE — ED ADULT TRIAGE NOTE - CHIEF COMPLAINT QUOTE
sib md gutierrez r/o possible stroke. pt states " I have left facial numbness, tingling down my arm, left chest pain, dizziness, confusion and neck throbbing x weeks". md salmeron at triage no code stroke pt to main. ekg in triage. pmh: arrhthymias, cad, hbp, high cholesterol.

## 2024-01-25 NOTE — H&P ADULT - NSHPPHYSICALEXAM_GEN_ALL_CORE
T(C): 36.6 (01-25-24 @ 23:10), Max: 36.9 (01-25-24 @ 12:31)  HR: 89 (01-25-24 @ 23:10) (75 - 95)  BP: 139/96 (01-25-24 @ 23:10) (128/86 - 156/105)  RR: 18 (01-25-24 @ 23:10) (16 - 22)  SpO2: 99% (01-25-24 @ 23:10) (96% - 100%)    CONSTITUTIONAL: Well groomed, no apparent distress  EYES: PERRLA and symmetric, EOMI, No conjunctival or scleral injection, non-icteric  ENMT: Oral mucosa with moist membranes. Normal dentition; no pharyngeal injection or exudates             NECK: Supple, symmetric and without tracheal deviation   RESP: No respiratory distress, no use of accessory muscles; CTA b/l, no WRR  CV: RRR, +S1S2, no MRG; no JVD; no peripheral edema  GI: Soft, NT, ND, no rebound, no guarding; no palpable masses; no hepatosplenomegaly; no hernia palpated  LYMPH: No cervical LAD or tenderness; no axillary LAD or tenderness; no inguinal LAD or tenderness  MSK: Normal ROM without pain, no spinal tenderness, normal muscle strength/tone  SKIN: No rashes or ulcers noted;   NEURO: CN II-XII intact; normal reflexes in upper and lower extremities, decreased sensation in left upper and lower extremities  to light touch   PSYCH: Appropriate insight/judgment; A+O x 3, mood and affect appropriate, recent/remote memory intact

## 2024-01-25 NOTE — ED ADULT NURSE NOTE - NSICDXPASTMEDICALHX_GEN_ALL_CORE_FT
PAST MEDICAL HISTORY:  Asthma     CA skin, basal cell     CAD (coronary artery disease)     High cholesterol     Spasmodic dysphonia

## 2024-01-25 NOTE — PATIENT PROFILE ADULT - FALL HARM RISK - PATIENT NEEDS ASSISTANCE
pt had reported AH /VH beginning 2 weeks prior to admission but not x past 24 hours since admission 5/27/2020 Standing/Walking/Moving from bed to chair

## 2024-01-25 NOTE — PATIENT PROFILE ADULT - FALL HARM RISK - HARM RISK INTERVENTIONS

## 2024-01-25 NOTE — ED ADULT NURSE REASSESSMENT NOTE - CONDITION
----- Message from Babak Estevez sent at 8/23/2021 12:01 PM EDT -----  Regarding: Cancellation  <48-HOUR NOTICE: Patient contacted office and spoke with writer to cancel therapy appointment on 8/24/2021 at 9:00  Provided less than 48-hour notice  Reason: Mother in hospice steadily mark, Cancelled appointment out of provider's schedule  , Please notify me high priority if you would like this appointment added back into schedule to rohan them as No Show (Late Cancellation) 
unchanged
Statement Selected

## 2024-01-25 NOTE — H&P ADULT - NSHPREVIEWOFSYSTEMS_GEN_ALL_CORE
Gen: No fever, chills, weakness  ENT: No visual changes or throat pain  Neck: No pain or stiffness  Respiratory: No cough or wheezing  Cardiovascular: ++ chest pain, no palpitations  Gastrointestinal: No abdominal pain, nausea, vomiting, constipation, or diarrhea  Hematologic: No easy bleeding or bruising  Neurologic: ++ numbness, + focal weakness  Psych: No depression or insomnia  Skin: No rash or itching

## 2024-01-25 NOTE — PATIENT PROFILE ADULT - STATED REASON FOR ADMISSION
pt stated that she was having brain fog, numbness in the left side of face, left arm pain, chest pain, numbness in both hands and feet, and dizziness.

## 2024-01-25 NOTE — ED PROVIDER NOTE - PROGRESS NOTE DETAILS
Cordelia Rush for ED attending, Dr. Aburto: discussed case with neuro, given pt risk factors requesting pt to be admitted, for MRI and give aspirin. Pt given metoprolol for BP.     I Theodore Rush attest that this documentation has been prepared under the direction and in the presence of Doctor Aburto Cordelia Rush for ED attending, Dr. Aburto: discussed case with neuro, given pt risk factors requesting pt to be admitted, for MRI and give aspirin. Pt given metoprolol for BP. nihss 1    I Theodore Rush attest that this documentation has been prepared under the direction and in the presence of Doctor Aburto

## 2024-01-25 NOTE — ED ADULT NURSE NOTE - [facility] ED NUR TIMING2
Physical Therapy Progress Note    Visit Type: Progress Note  Visit: 12  Referring Provider: Gregg Cullen,*  Medical Diagnosis (from order): Diagnosis Information    Diagnosis  M25.551 (ICD-10-CM) - Right hip pain  M25.561 (ICD-10-CM) - Right knee pain, unspecified chronicity  M54.40, G89.29 (ICD-10-CM) - Chronic low back pain with sciatica, sciatica laterality unspecified, unspecified back pain laterality         SUBJECTIVE                                                                                                               No acute changes this session. Tried stairs and was finally able to at home with some hesitancy.  Current Functional Limitations: Difficulty with stairs  Reduced activity tolerance  Knee pain      OBJECTIVE                                                                                                                     Range of Motion (ROM)   (degrees unless noted; active unless noted; norms in ( ); negative=lacking to 0, positive=beyond 0)  Knee:   - Flexion (150):      • Right:  115                     Outcome/Assessments  Outcome Measures:   Lower Extremity Functional Scale: LEFS Calculated Total: 8 (0=extreme difficulty; 80=no difficulty) see flowsheet for additional documentation        Treatment     Therapeutic Exercise  Nustep [id number] lvl 4  Step ups on stairs 2x10 ea leg with single UE support  Foot tap on stairs 3x10 ea  Standing banded hip abd 3x10 OTB - cues for eccentric control  Standing banded hip ext 3x10 OTB - cues foe eccentric control  Therapeutic exercise to increase strength, joint function, AROM,cardiopulmonary function and ADL function; to decrease pain, risk and amount of fatigue, skeletal muscle loss, functional loss; to assist in maintining or improving proper body mass index.      Manual Therapy   STM to right quad with roller  Posterior tibfem jt mobilizations grades 2-3  Anterior tibfem jt mobilizations grades 2-3  Manual Therapy to increase  myofascial / soft tissue length, mobility and pliability, increase PROM, AROM, function and decrease pain.      Skilled input: verbal instruction/cues, demonstration, tactile instruction/cues and posture correction    Writer verbally educated and received verbal consent for hand placement, positioning of patient, and techniques to be performed today from patient for hand placement and palpation for techniques, therapist position for techniques and clothing adjustments for techniques as described above and how they are pertinent to the patient's plan of care.      ASSESSMENT                                                                                                          To date the patient has made gains as expected.  Patient continues to have impairments and functional deficits as noted.  Patient will continue to benefit from skilled care as outlined.    Patient has made progress based on objective measurements that were taken today however still has weakness and would benefit from further strengthening. Patient reports feeling 40-50% better since her initial visit.  Patient requires further skilled PT to improve gait, transfers, stairs, and body mechanics to avoid any future injury. Patient will be seen emphasizing proper firing patterns and addressing altered motor control that is apparent. In discussion and in agreement with the patient a transition program will be set forth to allow patient to continue to build on strengths and gains made in PT.       PLAN                                                                                                                           Suggestions for next session as indicated: Progress per plan of care      Goals  Long Term Goals: to be met by end of plan of care  1. Lower Extremity Functional Scale: Patient will complete form to reflect an improved raw score to greater than or equal to 25/80 to indicate patient reported improvement in  function/disability/impairment (minimal detectable change: 9 points) to be met by end of plan of care. Status: progressing/ongoing  2. Patient will demonstrate ability to negotiate level and unlevel surfaces at variable velocities, including change of direction without increased pain or instability to return to age appropriate and community activities at prior level of function with least restrictive device, to be met by end of plan of care. Status: progressing/ongoing  3. Patient will be able to safely and independently complete bed mobility with no pain in order to safely exit/enter her bed, to be met by end of plan of care. Status: progressing/ongoing  4. Patient will be independent with progressed and modified home exercise program.  Status: progressing/ongoing      Therapy procedure time and total treatment time can be found documented on the Time Entry flowsheet     gradual onset

## 2024-01-25 NOTE — H&P ADULT - HISTORY OF PRESENT ILLNESS
58-year-old patient with past medical history for  coronary artery disease, Lyme disease, WPW status post ablation presented to the emergency department for left- sided numbness. Patient initially reports that she also has been having chest discomfort. Her left-sided numbness for the last several weeks.  However upon further questioning patient now states that the symptoms have been  becoming worse over the last 2 days.  Patient went and saw cardiology yesterday and recommended to go to the ER for further evaluation.

## 2024-01-25 NOTE — ED ADULT NURSE NOTE - NSFALLUNIVINTERV_ED_ALL_ED
Bed/Stretcher in lowest position, wheels locked, appropriate side rails in place/Call bell, personal items and telephone in reach/Instruct patient to call for assistance before getting out of bed/chair/stretcher/Non-slip footwear applied when patient is off stretcher/[employer] to call system/Physically safe environment - no spills, clutter or unnecessary equipment/Purposeful proactive rounding/Room/bathroom lighting operational, light cord in reach

## 2024-01-26 LAB
A1C WITH ESTIMATED AVERAGE GLUCOSE RESULT: 5 % — SIGNIFICANT CHANGE UP (ref 4–5.6)
ADD ON TEST-SPECIMEN IN LAB: SIGNIFICANT CHANGE UP
ALBUMIN SERPL ELPH-MCNC: 3.4 G/DL — SIGNIFICANT CHANGE UP (ref 3.3–5)
ALP SERPL-CCNC: 110 U/L — SIGNIFICANT CHANGE UP (ref 40–120)
ALT FLD-CCNC: 60 U/L — SIGNIFICANT CHANGE UP (ref 12–78)
ANION GAP SERPL CALC-SCNC: 3 MMOL/L — LOW (ref 5–17)
AST SERPL-CCNC: 124 U/L — HIGH (ref 15–37)
BASOPHILS # BLD AUTO: 0.07 K/UL — SIGNIFICANT CHANGE UP (ref 0–0.2)
BASOPHILS NFR BLD AUTO: 1.1 % — SIGNIFICANT CHANGE UP (ref 0–2)
BILIRUB DIRECT SERPL-MCNC: 0.1 MG/DL — SIGNIFICANT CHANGE UP (ref 0–0.3)
BILIRUB INDIRECT FLD-MCNC: 0.4 MG/DL — SIGNIFICANT CHANGE UP (ref 0.2–1)
BILIRUB SERPL-MCNC: 0.5 MG/DL — SIGNIFICANT CHANGE UP (ref 0.2–1.2)
BUN SERPL-MCNC: 9 MG/DL — SIGNIFICANT CHANGE UP (ref 7–23)
CALCIUM SERPL-MCNC: 9.2 MG/DL — SIGNIFICANT CHANGE UP (ref 8.5–10.1)
CHLORIDE SERPL-SCNC: 110 MMOL/L — HIGH (ref 96–108)
CHOLEST SERPL-MCNC: 234 MG/DL — HIGH
CK SERPL-CCNC: 92 U/L — SIGNIFICANT CHANGE UP (ref 26–192)
CO2 SERPL-SCNC: 28 MMOL/L — SIGNIFICANT CHANGE UP (ref 22–31)
CREAT SERPL-MCNC: 0.75 MG/DL — SIGNIFICANT CHANGE UP (ref 0.5–1.3)
EGFR: 93 ML/MIN/1.73M2 — SIGNIFICANT CHANGE UP
EOSINOPHIL # BLD AUTO: 0.16 K/UL — SIGNIFICANT CHANGE UP (ref 0–0.5)
EOSINOPHIL NFR BLD AUTO: 2.4 % — SIGNIFICANT CHANGE UP (ref 0–6)
ERYTHROCYTE [SEDIMENTATION RATE] IN BLOOD: 5 MM/HR — SIGNIFICANT CHANGE UP (ref 0–20)
ESTIMATED AVERAGE GLUCOSE: 97 MG/DL — SIGNIFICANT CHANGE UP (ref 68–114)
GLUCOSE SERPL-MCNC: 106 MG/DL — HIGH (ref 70–99)
HCT VFR BLD CALC: 35.8 % — SIGNIFICANT CHANGE UP (ref 34.5–45)
HDLC SERPL-MCNC: 58 MG/DL — SIGNIFICANT CHANGE UP
HGB BLD-MCNC: 12.7 G/DL — SIGNIFICANT CHANGE UP (ref 11.5–15.5)
IMM GRANULOCYTES NFR BLD AUTO: 0.5 % — SIGNIFICANT CHANGE UP (ref 0–0.9)
LIPID PNL WITH DIRECT LDL SERPL: 97 MG/DL — SIGNIFICANT CHANGE UP
LYMPHOCYTES # BLD AUTO: 1.65 K/UL — SIGNIFICANT CHANGE UP (ref 1–3.3)
LYMPHOCYTES # BLD AUTO: 25 % — SIGNIFICANT CHANGE UP (ref 13–44)
MCHC RBC-ENTMCNC: 35.5 GM/DL — SIGNIFICANT CHANGE UP (ref 32–36)
MCHC RBC-ENTMCNC: 36.1 PG — HIGH (ref 27–34)
MCV RBC AUTO: 101.7 FL — HIGH (ref 80–100)
MONOCYTES # BLD AUTO: 0.59 K/UL — SIGNIFICANT CHANGE UP (ref 0–0.9)
MONOCYTES NFR BLD AUTO: 8.9 % — SIGNIFICANT CHANGE UP (ref 2–14)
NEUTROPHILS # BLD AUTO: 4.11 K/UL — SIGNIFICANT CHANGE UP (ref 1.8–7.4)
NEUTROPHILS NFR BLD AUTO: 62.1 % — SIGNIFICANT CHANGE UP (ref 43–77)
NON HDL CHOLESTEROL: 176 MG/DL — HIGH
NT-PROBNP SERPL-SCNC: 162 PG/ML — HIGH (ref 0–125)
PLATELET # BLD AUTO: 131 K/UL — LOW (ref 150–400)
POTASSIUM SERPL-MCNC: 3.5 MMOL/L — SIGNIFICANT CHANGE UP (ref 3.5–5.3)
POTASSIUM SERPL-SCNC: 3.5 MMOL/L — SIGNIFICANT CHANGE UP (ref 3.5–5.3)
PROT SERPL-MCNC: 6.5 GM/DL — SIGNIFICANT CHANGE UP (ref 6–8.3)
RBC # BLD: 3.52 M/UL — LOW (ref 3.8–5.2)
RBC # FLD: 12.2 % — SIGNIFICANT CHANGE UP (ref 10.3–14.5)
SODIUM SERPL-SCNC: 141 MMOL/L — SIGNIFICANT CHANGE UP (ref 135–145)
TRIGL SERPL-MCNC: 471 MG/DL — HIGH
TROPONIN I, HIGH SENSITIVITY RESULT: 5.88 NG/L — SIGNIFICANT CHANGE UP
TSH SERPL-MCNC: 1.43 UU/ML — SIGNIFICANT CHANGE UP (ref 0.34–4.82)
VIT B12 SERPL-MCNC: 523 PG/ML — SIGNIFICANT CHANGE UP (ref 232–1245)
WBC # BLD: 6.61 K/UL — SIGNIFICANT CHANGE UP (ref 3.8–10.5)
WBC # FLD AUTO: 6.61 K/UL — SIGNIFICANT CHANGE UP (ref 3.8–10.5)

## 2024-01-26 PROCEDURE — 70551 MRI BRAIN STEM W/O DYE: CPT | Mod: 26

## 2024-01-26 PROCEDURE — 99232 SBSQ HOSP IP/OBS MODERATE 35: CPT

## 2024-01-26 PROCEDURE — 93306 TTE W/DOPPLER COMPLETE: CPT | Mod: 26

## 2024-01-26 RX ORDER — LANOLIN ALCOHOL/MO/W.PET/CERES
3 CREAM (GRAM) TOPICAL AT BEDTIME
Refills: 0 | Status: DISCONTINUED | OUTPATIENT
Start: 2024-01-26 | End: 2024-01-28

## 2024-01-26 RX ORDER — DIAZEPAM 5 MG
5 TABLET ORAL AT BEDTIME
Refills: 0 | Status: DISCONTINUED | OUTPATIENT
Start: 2024-01-26 | End: 2024-01-28

## 2024-01-26 RX ORDER — ACETAMINOPHEN 500 MG
650 TABLET ORAL EVERY 6 HOURS
Refills: 0 | Status: DISCONTINUED | OUTPATIENT
Start: 2024-01-26 | End: 2024-01-28

## 2024-01-26 RX ORDER — ASPIRIN/CALCIUM CARB/MAGNESIUM 324 MG
81 TABLET ORAL DAILY
Refills: 0 | Status: DISCONTINUED | OUTPATIENT
Start: 2024-01-26 | End: 2024-01-28

## 2024-01-26 RX ORDER — CLONAZEPAM 1 MG
0.5 TABLET ORAL DAILY
Refills: 0 | Status: DISCONTINUED | OUTPATIENT
Start: 2024-01-26 | End: 2024-01-28

## 2024-01-26 RX ORDER — ALBUTEROL 90 UG/1
2.5 AEROSOL, METERED ORAL EVERY 4 HOURS
Refills: 0 | Status: DISCONTINUED | OUTPATIENT
Start: 2024-01-26 | End: 2024-01-28

## 2024-01-26 RX ORDER — ATORVASTATIN CALCIUM 80 MG/1
80 TABLET, FILM COATED ORAL AT BEDTIME
Refills: 0 | Status: DISCONTINUED | OUTPATIENT
Start: 2024-01-26 | End: 2024-01-28

## 2024-01-26 RX ORDER — ONDANSETRON 8 MG/1
4 TABLET, FILM COATED ORAL EVERY 8 HOURS
Refills: 0 | Status: DISCONTINUED | OUTPATIENT
Start: 2024-01-26 | End: 2024-01-28

## 2024-01-26 RX ORDER — CYCLOBENZAPRINE HYDROCHLORIDE 10 MG/1
10 TABLET, FILM COATED ORAL DAILY
Refills: 0 | Status: DISCONTINUED | OUTPATIENT
Start: 2024-01-26 | End: 2024-01-28

## 2024-01-26 RX ORDER — TIOTROPIUM BROMIDE 18 UG/1
2 CAPSULE ORAL; RESPIRATORY (INHALATION) DAILY
Refills: 0 | Status: DISCONTINUED | OUTPATIENT
Start: 2024-01-26 | End: 2024-01-28

## 2024-01-26 RX ORDER — CARVEDILOL PHOSPHATE 80 MG/1
6.25 CAPSULE, EXTENDED RELEASE ORAL EVERY 12 HOURS
Refills: 0 | Status: DISCONTINUED | OUTPATIENT
Start: 2024-01-26 | End: 2024-01-28

## 2024-01-26 RX ADMIN — TIOTROPIUM BROMIDE 2 PUFF(S): 18 CAPSULE ORAL; RESPIRATORY (INHALATION) at 09:35

## 2024-01-26 RX ADMIN — Medication 1 MILLIGRAM(S): at 15:32

## 2024-01-26 RX ADMIN — CARVEDILOL PHOSPHATE 6.25 MILLIGRAM(S): 80 CAPSULE, EXTENDED RELEASE ORAL at 21:45

## 2024-01-26 RX ADMIN — ALBUTEROL 2.5 MILLIGRAM(S): 90 AEROSOL, METERED ORAL at 10:12

## 2024-01-26 RX ADMIN — Medication 81 MILLIGRAM(S): at 10:37

## 2024-01-26 RX ADMIN — ATORVASTATIN CALCIUM 80 MILLIGRAM(S): 80 TABLET, FILM COATED ORAL at 21:44

## 2024-01-26 RX ADMIN — Medication 0.5 MILLIGRAM(S): at 10:37

## 2024-01-26 RX ADMIN — Medication 5 MILLIGRAM(S): at 03:17

## 2024-01-26 RX ADMIN — Medication 650 MILLIGRAM(S): at 05:47

## 2024-01-26 RX ADMIN — CARVEDILOL PHOSPHATE 6.25 MILLIGRAM(S): 80 CAPSULE, EXTENDED RELEASE ORAL at 02:11

## 2024-01-26 RX ADMIN — CARVEDILOL PHOSPHATE 6.25 MILLIGRAM(S): 80 CAPSULE, EXTENDED RELEASE ORAL at 10:37

## 2024-01-26 NOTE — CONSULT NOTE ADULT - NS ATTEND AMEND GEN_ALL_CORE FT
I saw and examined the patient with Vini Thompson.   patient reporting several weeks of L arm sensory and motor symptoms.      On exam:   Awake, alert, oriented, speaking fluently, normal word choice.   Pupisl 4-3mm, symmetric, full EOM, full VF's, no facial weakness, no dysarthria.   MOTOR: normal bulk and tone, no drift.    COORDINATION: normal fNF  REFLEXES: 1+ symmetric BB, br, patellar,a chilles.  Toes bilaterally down.     MRI brain images reviewed: no diffusion restriction, no abnormal FLAIR hyperintensities.     AP: 58 year old woman with CAD, spasmodic dysphonia, and cervical dystonia, peripheral neuropathy, HTN, WPW, with l sided sensory changes over last several weeks.  On exam, has diminished sensation in the L arm.  Imaging of the brain unremarkable.    could be a peripheral neuropathy, including brachial plexopathy, but she does not have any complaint of pain.    Will need further workup as an outpatient, including EMG and possible cervical spine imaging.    No further inpatient neurology recommendations at this time.  Please page or call with any acute neuro changes, or other issues we can help address.

## 2024-01-26 NOTE — DIETITIAN INITIAL EVALUATION ADULT - PERTINENT LABORATORY DATA
01-26    141  |  110<H>  |  9   ----------------------------<  106<H>  3.5   |  28  |  0.75    Ca    9.2      26 Jan 2024 06:53  Mg     2.4     01-25    TPro  7.2  /  Alb  3.7  /  TBili  0.8  /  DBili  x   /  AST  109<H>  /  ALT  63  /  AlkPhos  85  01-25

## 2024-01-26 NOTE — SWALLOW BEDSIDE ASSESSMENT ADULT - SWALLOW EVAL: CRITERIA FOR SKILLED INTERVENTION MET
DO NOT FEEL THAT ACUTE SPEECH PATHOLOGY FOLLOW UP WOULD CHANGE CLINICAL MANAGEMENT/OUTCOME IN HOSPITAL SETTING. PT'S OROPHARYNGEAL SWALLOWING INTEGRITY AND SPEECH-LANGUAGE INTEGRITY ARE FUNCTIONAL/AT PRE-HOSPITALIZATION STATE/MAXIMIZED. AS FOR PATIENT'S SPASMODIC DYSPHONIA, THIS IS LONG TERM/PRE-EXISTING FOR WHICH HER DYSPHONIA HAS BEEN REFRACTORY. DO NOT FEEL THAT ACUTE ST WOULD BE OF CLINICAL BENEFIT IN HOSPITAL. GIVEN ABOVE, THIS SERVICE WILL NOT ACTIVELY FOLLOW. RECONSULT PRN SHOULD STATUS CHANGE AND CONDITION WARRANT.

## 2024-01-26 NOTE — CONSULT NOTE ADULT - ASSESSMENT
58-year-old patient with past medical history for  coronary artery disease, spasmodic dysphonia, cervical dystonia, distal peripheral neuropathy since her lyme disease diagnosis in the 1990's, HTN, Lyme disease, WPW status post ablation presented to the emergency department for left- sided numbness. Patient initially reports that she also has been having chest discomfort. Her left-sided numbness for the last several weeks.  However upon further questioning patient now states that the symptoms have been  becoming worse over the last 2 days.  Patient went and saw cardiology yesterday and recommended to go to the ER for further evaluation.  Neurology consulted for L sided numbness.  Denies any dysarthria, visual changes, unsteady gait.  She does occasionally mixes up her words, loses her  suddenly, and has random involuntary twitching but these are all chronic.  She follows with Neurologist Dr. Curtis, and ENT Pito Chavez.  CT head, CTA/CTP is neg for acute bleed, ischemia, LVO      #LUE/L facial decreased sensation for the last several weeks-unclear etiology      Recommendations:  -F/U MRI brain   -PT/OT evaluation  -Check B12, B1, vit D.  TSH wnl  -will follow    D/W Dr. Zaman

## 2024-01-26 NOTE — SWALLOW BEDSIDE ASSESSMENT ADULT - NS SPL SWALLOW CLINIC TRIAL FT
The patient demonstrated Oropharyngeal Swallowing integrity which subjectively appeared to be within functional parameters for age. Bolus formation/transfer were mechanically functional for age without evidence of a wayne oral motor focality and swallow was triggered in an acceptable time frame for age as well. Moreover, laryngeal lift on palpation during swallowing trials was felt to also be functional for age. NO behavioral aspiration signs exhibited. No change in O2 sats noted. Odynophagia was denied.

## 2024-01-26 NOTE — PHYSICAL THERAPY INITIAL EVALUATION ADULT - GENERAL OBSERVATIONS, REHAB EVAL
Pt was found lying in bed with tele on, Pt is willing to participate in PT, feels weakness in the legs

## 2024-01-26 NOTE — DIETITIAN INITIAL EVALUATION ADULT - ORAL INTAKE PTA/DIET HISTORY
Lives at home w/ her michel who does the cooking and grocery shopping. Endorses chronically poor PO intake PTA. Tries to consume/ gain wt at home however pt states she is unsuccessful. Will drink probiotic yogurts and make "healthy shakes" which include fruits; michel will cook her chicken, pasta, soups per pt report. Meeting <75% ENN chronically. W/ hypertriglyceridemia, states she was off meds for a while and doesn't "make best food choices." W/ persistent nausea - takes Pepto-Bismol. Also endorses occasional diff w/ chewing and swallowing.  Allergy to Latex and honey noted in chart.

## 2024-01-26 NOTE — DIETITIAN INITIAL EVALUATION ADULT - PERTINENT MEDS FT
MEDICATIONS  (STANDING):  aspirin enteric coated 81 milliGRAM(s) Oral daily  atorvastatin 80 milliGRAM(s) Oral at bedtime  carvedilol 6.25 milliGRAM(s) Oral every 12 hours  clonazePAM  Tablet 0.5 milliGRAM(s) Oral daily  tiotropium 2.5 MICROgram(s) Inhaler 2 Puff(s) Inhalation daily    MEDICATIONS  (PRN):  acetaminophen     Tablet .. 650 milliGRAM(s) Oral every 6 hours PRN Temp greater or equal to 38C (100.4F), Mild Pain (1 - 3)  albuterol    0.083% 2.5 milliGRAM(s) Nebulizer every 4 hours PRN Shortness of Breath and/or Wheezing  aluminum hydroxide/magnesium hydroxide/simethicone Suspension 30 milliLiter(s) Oral every 4 hours PRN Dyspepsia  cyclobenzaprine 10 milliGRAM(s) Oral daily PRN Muscle Spasm  diazepam    Tablet 5 milliGRAM(s) Oral at bedtime PRN anxiety  melatonin 3 milliGRAM(s) Oral at bedtime PRN Insomnia  ondansetron Injectable 4 milliGRAM(s) IV Push every 8 hours PRN Nausea and/or Vomiting

## 2024-01-26 NOTE — SWALLOW BEDSIDE ASSESSMENT ADULT - SWALLOW EVAL: PROGNOSIS
2) The pt demonstrates Oropharyngeal Swallowing integrity which subjectively appeared to be within functional parameters for age. Bolus formation/transfer were mechanically functional for age without evidence of a wayen oral motor focality and swallow was triggered in an acceptable time frame for age as well. Moreover, laryngeal lift on palpation during swallowing trials was felt to also be functional for age. NO behavioral aspiration signs exhibited. No change in O2 sats noted. Odynophagia was denied.

## 2024-01-26 NOTE — DIETITIAN INITIAL EVALUATION ADULT - ADD RECOMMEND
1) C/w regular texture/ thin liquids per SLP recs. Liberalize diet to regular to maximize caloric and nutrient intake. Encourage protein-rich foods, maximize food preferences   2) Will add high-protein berry smoothie BID (370 kcal, 26g protein) in effort to optimize nutrient needs   3) Monitor bowel movements, if no BM for >3 days, consider implementing bowel regimen.   4) MVI w/ minerals daily to ensure 100% RDA met   5) Monitor daily lytes/min and replete prn   6) Obtain weekly wt to track/trend changes   7) Confirm goals of care regarding nutrition support   RD will continue to monitor PO intake, labs, hydration, and wt prn. **pt at HIGH risk of PCM**

## 2024-01-26 NOTE — DIETITIAN INITIAL EVALUATION ADULT - OTHER INFO
58-year-old patient with past medical history for  coronary artery disease, Lyme disease, WPW status post ablation presented to the emergency department for left- sided numbness. Patient initially reports that she also has been having chest discomfort. Her left-sided numbness for the last several weeks.  However upon further questioning patient now states that the symptoms have been  becoming worse over the last 2 days.  Patient went and saw cardiology yesterday and recommended to go to the ER for further evaluation.  GOC: full code.    Currently on DASH/TLC diet. RD observed breakfast tray, consumed ~50% tray. States appetite is slightly improving since admission. Remains w/ persistent nausea however Zofran ordered which pt states is helping. UBW stated between 105-114# - unable to gain wt over 114#. No wt hx to review in chart. Bed scale wt of 131# taken by RD on 1/26 however ?accuracy - will use EMR wt to calculate ENN (113# on 1/25/24). Appeared very thin however NFPE does not reveal significant muscle/fat wasting (only severe buccal and moderate shoulder wasting noted at this time). Pt does not meet criteria for PCM at this time however will continue to monitor as pt at HIGH RISK. RD provided verbal/written education on ways to increase caloric/protein intake throughout the day - pt receptive and thankful for RD visit. Also requesting education on ways to lower TG levels - RD left NCM handouts at bed side (high kcal/protein MNT, high kcal/protein recipes, heart healthy fats MNT). Will add high-protein berry smoothie BID in effort to optimize nutrient needs (370 kcal, 26g protein) - pt receptive. Liberalize diet to regular to maximize caloric and nutrient intake. SLP anushka completed and recommended to remain on regular texture/ thin liquids (SLP eval appreciated). See further recommendations below.

## 2024-01-26 NOTE — SWALLOW BEDSIDE ASSESSMENT ADULT - SWALLOW EVAL: RECOMMENDED DIET
SUGGEST A REGULAR CONSISTENCY DIET WITH THIN LIQUID TEXTURES AS THE PATIENT APPEARED CLINICALLY TOLERANT OF THESE FOOD TEXTURES FROM AN OROPHARYNGEAL SWALLOWING PERSPECTIVE ON EXAM.

## 2024-01-26 NOTE — PHYSICAL THERAPY INITIAL EVALUATION ADULT - NSPTDISCHREC_GEN_A_CORE
Pt was able to amb 100' independently w/o any AD, slow and steady on feet, pt says she feels weak but does not appear to be unsteady, pt can amb with floor staff, Pt was left in bed as wanted to take rest, Pt does not require acute care PT, DC from PT/No skilled PT needs

## 2024-01-26 NOTE — SWALLOW BEDSIDE ASSESSMENT ADULT - COMMENTS
The patient was admitted to  with c/o chest pain and LUE numbness. Troponin negative. CTH negative for lesion/acute neuro event. She has an underlying history of recent urosepsis, CAD, HLD, asthma, prior Basal Cell Skin Cancer removal and multiple lipoma removals. She also has a longstanding history of Spasmodic Dysphonia which has reportedly been refractory to therapy/treatments such as Botox. Spasmodic Dysphonia diagnosis was made by Dr Pito Chadwick who is a very highly regarded laryngologist in [place]. The pt stated that Dr Chadwick also diagnosed her with Cervical Dystonia and she is followed by a neurologist named Dr Miranda on [facility].

## 2024-01-26 NOTE — SWALLOW BEDSIDE ASSESSMENT ADULT - SWALLOW EVAL: DIAGNOSIS
1) On encounter, the pt was alert and interactive. She stated that she has a h/o cervical dystonia but no wayne Torticollis was apparent. The pt was able to verbalize during communicative probes. At these times, she c/o intermittent word finding difficulties which could not be reproduced. Pt's verbalizations were linguistically intact & contextually appropriate. Moreover, pt was able to confrontationally name objects in rapid fire as well as describe their function. Pt's motor speech integrity also appeared to be functional when she spoke without evidence of Dysarthria or Verbal Apraxia. With that being stated, pt's voice was strained/strangled with periodic aphonic breaks. The latter is c/w Spasmodic Dysphonia which is long term/pre-existing. Pt has attempted therapy/Botox in the past without favorable results.

## 2024-01-26 NOTE — SWALLOW BEDSIDE ASSESSMENT ADULT - SLP GENERAL OBSERVATIONS
On encounter, the pt was alert and interactive. She stated that she has a h/o cervical dystonia but no wayne Torticollis was apparent. The pt was able to verbalize during communicative probes. At these times, she c/o intermittent word finding difficulties which could not be reproduced. Pt's verbalizations were linguistically intact & contextually appropriate. Moreover, pt was able to confrontationally name objects in rapid fire as well as describe their function. Pt's motor speech integrity also appeared to be functional when she spoke without evidence of Dysarthria or Verbal Apraxia. With that being stated, pt's voice was strained/strangled with periodic aphonic breaks. The latter is c/w Spasmodic Dysphonia which is long term/pre-existing. Pt has attempted therapy/Botox in the past without favorable results.

## 2024-01-27 LAB
24R-OH-CALCIDIOL SERPL-MCNC: 37.3 NG/ML — SIGNIFICANT CHANGE UP (ref 30–80)
B BURGDOR C6 AB SER-ACNC: NEGATIVE — SIGNIFICANT CHANGE UP
B BURGDOR IGG+IGM SER QL IB: SIGNIFICANT CHANGE UP
B BURGDOR IGG+IGM SER-ACNC: 0.59 INDEX — SIGNIFICANT CHANGE UP (ref 0.01–0.89)
BABESIA MICROTI PCR, BLD RESULT: SIGNIFICANT CHANGE UP
CRP SERPL-MCNC: <3 MG/L — SIGNIFICANT CHANGE UP
FOLATE SERPL-MCNC: 7 NG/ML — SIGNIFICANT CHANGE UP
HAV IGM SER-ACNC: SIGNIFICANT CHANGE UP
HBV CORE IGM SER-ACNC: SIGNIFICANT CHANGE UP
HBV SURFACE AG SER-ACNC: SIGNIFICANT CHANGE UP
HCV AB S/CO SERPL IA: 0.12 S/CO — SIGNIFICANT CHANGE UP (ref 0–0.99)
HCV AB SERPL-IMP: SIGNIFICANT CHANGE UP
PROCALCITONIN SERPL-MCNC: 0.06 NG/ML — SIGNIFICANT CHANGE UP (ref 0.02–0.1)
VIT B12 SERPL-MCNC: 629 PG/ML — SIGNIFICANT CHANGE UP (ref 232–1245)
VIT D25+D1,25 OH+D1,25 PNL SERPL-MCNC: 74.8 PG/ML — SIGNIFICANT CHANGE UP (ref 19.9–79.3)

## 2024-01-27 PROCEDURE — 99232 SBSQ HOSP IP/OBS MODERATE 35: CPT

## 2024-01-27 PROCEDURE — 93880 EXTRACRANIAL BILAT STUDY: CPT | Mod: 26

## 2024-01-27 RX ADMIN — Medication 0.5 MILLIGRAM(S): at 09:54

## 2024-01-27 RX ADMIN — Medication 81 MILLIGRAM(S): at 09:53

## 2024-01-27 RX ADMIN — TIOTROPIUM BROMIDE 2 PUFF(S): 18 CAPSULE ORAL; RESPIRATORY (INHALATION) at 07:52

## 2024-01-27 RX ADMIN — Medication 5 MILLIGRAM(S): at 21:19

## 2024-01-27 RX ADMIN — Medication 5 MILLIGRAM(S): at 00:51

## 2024-01-27 RX ADMIN — CARVEDILOL PHOSPHATE 6.25 MILLIGRAM(S): 80 CAPSULE, EXTENDED RELEASE ORAL at 09:53

## 2024-01-27 RX ADMIN — CARVEDILOL PHOSPHATE 6.25 MILLIGRAM(S): 80 CAPSULE, EXTENDED RELEASE ORAL at 21:13

## 2024-01-27 RX ADMIN — ALBUTEROL 2.5 MILLIGRAM(S): 90 AEROSOL, METERED ORAL at 07:52

## 2024-01-27 RX ADMIN — ATORVASTATIN CALCIUM 80 MILLIGRAM(S): 80 TABLET, FILM COATED ORAL at 21:13

## 2024-01-27 RX ADMIN — Medication 650 MILLIGRAM(S): at 20:14

## 2024-01-27 NOTE — OCCUPATIONAL THERAPY INITIAL EVALUATION ADULT - MD ORDER
MD orders received. Chart reviewed, contents noted, conferred with RN.  Pt tolerated OT evaluation, see initial evaluation for findings. Pt presenting with generalized weakness and difficulty standing. Pt previously d/c'd from PT program as IND without WD- RN informed and is going to do throughout assessment- PT informed as well. Pt reports that her strength and endurance has varied like this in the past but doesn't know the reason

## 2024-01-27 NOTE — CONSULT NOTE ADULT - SUBJECTIVE AND OBJECTIVE BOX
Patient is a 58y old  Female who presents with a chief complaint of left sided numbness (26 Jan 2024 19:22)    HPI:  58-year-old patient with past medical history for  coronary artery disease, Lyme disease, WPW status post ablation presented to the emergency department for left- sided numbness. Patient initially reports that she also has been having chest discomfort. Her left-sided numbness for the last several weeks.  However upon further questioning patient now states that the symptoms have been  becoming worse over the last 2 days.  Patient went and saw cardiologyand recommended to go to the ER for further evaluation. Concern raised for infection. had previous lyme and tick related diseases.     PMH: as above  PSH: as above  Meds: per reconciliation sheet, noted below  MEDICATIONS  (STANDING):  aspirin enteric coated 81 milliGRAM(s) Oral daily  atorvastatin 80 milliGRAM(s) Oral at bedtime  carvedilol 6.25 milliGRAM(s) Oral every 12 hours  clonazePAM  Tablet 0.5 milliGRAM(s) Oral daily  tiotropium 2.5 MICROgram(s) Inhaler 2 Puff(s) Inhalation daily    Allergies    rubber (Short breath; Swelling)  penicillin (Hives; Diarrhea)  cephalosporins (Hives; Diarrhea)  latex (Hives)    Intolerances      Social: no smoking, no alcohol, no illegal drugs; no recent travel, no exposure to TB  FAMILY HISTORY:  No pertinent family history in first degree relatives       no history of premature cardiovascular disease in first degree relatives    ROS: the patient denies fever, no chills, no HA, no dizziness, no sore throat, no blurry vision, no CP, no palpitations, no SOB, no cough, no abdominal pain, no diarrhea, no N/V, no dysuria, no leg pain, no claudication, no rash, no joint aches, no rectal pain or bleeding, no night sweats    All other systems reviewed and are negative    Vital Signs Last 24 Hrs  T(C): 37.3 (27 Jan 2024 09:03), Max: 37.4 (26 Jan 2024 15:52)  T(F): 99.1 (27 Jan 2024 09:03), Max: 99.3 (26 Jan 2024 15:52)  HR: 91 (27 Jan 2024 09:03) (62 - 91)  BP: 121/83 (27 Jan 2024 09:03) (118/74 - 131/61)  BP(mean): --  RR: 18 (27 Jan 2024 09:03) (18 - 18)  SpO2: 91% (27 Jan 2024 09:03) (91% - 96%)    Parameters below as of 27 Jan 2024 09:03  Patient On (Oxygen Delivery Method): room air      Daily     Daily     PE:  Constitutional: NAD   HEENT: NC/AT, EOMI, PERRLA, conjunctivae clear; ears and nose atraumatic; pharynx benign  Neck: supple; thyroid not palpable  Back: no tenderness  Respiratory: respiratory effort normal; clear to auscultation  Cardiovascular: S1S2 regular, no murmurs  Abdomen: soft, not tender, not distended, positive BS; liver and spleen WNL  Genitourinary: no suprapubic tenderness  Lymphatic: no LN palpable  Musculoskeletal: no muscle tenderness, no joint swelling or tenderness  Extremities: no pedal edema  Neurological/ Psychiatric: AxOx3, Judgement and insight normal;  moving all extremities  Skin: no rashes; no palpable lesions    Labs: all available labs reviewed                        12.7   6.61  )-----------( 131      ( 26 Jan 2024 22:20 )             35.8     01-26    141  |  110<H>  |  9   ----------------------------<  106<H>  3.5   |  28  |  0.75    Ca    9.2      26 Jan 2024 06:53  Mg     2.4     01-25    TPro  6.5  /  Alb  3.4  /  TBili  0.5  /  DBili  0.1  /  AST  124<H>  /  ALT  60  /  AlkPhos  110  01-26     LIVER FUNCTIONS - ( 26 Jan 2024 22:20 )  Alb: 3.4 g/dL / Pro: 6.5 gm/dL / ALK PHOS: 110 U/L / ALT: 60 U/L / AST: 124 U/L / GGT: x           Urinalysis Basic - ( 26 Jan 2024 06:53 )    Color: x / Appearance: x / SG: x / pH: x  Gluc: 106 mg/dL / Ketone: x  / Bili: x / Urobili: x   Blood: x / Protein: x / Nitrite: x   Leuk Esterase: x / RBC: x / WBC x   Sq Epi: x / Non Sq Epi: x / Bacteria: x          Radiology: all available radiological tests reviewed  < from: MR Head No Cont (01.26.24 @ 16:16) >    ACC: [id number] EXAM:  MR BRAIN   ORDERED BY: ALE AKBAR     PROCEDURE DATE:  01/26/2024          INTERPRETATION:  .    CLINICAL INFORMATION: Left-sided numbness.    TECHNIQUE: Multiplanar multisequential MRI of the brain was acquired   without the administration of IV gadolinium.    COMPARISON: Prior CT code stroke series from 1/25/2024.    FINDINGS: The brain parenchyma is normal in signal and morphology. There   is no evidence of acute ischemia on the diffusion-weighted images.    Ventricular size and configuration is unremarkable. No abnormal extra   axial fluid collections are noted. Flow-voids are noted throughout the   major intracranial vessels, on the T2 weighted images, consistent with   their patency. The sella turcica and posterior fossa are unremarkable.    The paranasal sinuses and mastoid air cells are clear. Calvarial signal   is within normal limits. The orbits appear unremarkable.    IMPRESSION: No acute intracranial hemorrhage or evidence of acute   ischemia.    --- End of Report ---        < end of copied text >    Advanced directives addressed: full resuscitation
Patient is a 58y old  Female who presents with a chief complaint of left sided numbness (2024 13:20)    ________________________________  CHRIS GARCIA is a 58y year old Female with a past medical history of WPW status post ablation at [facility] 823, nonobstructive coronary artery disease noted on coronary angiogram in . This showed a proximal 40% stenosis of the right coronary artery and a 30% stenosis of the ostial LAD.  Lipoprotein a normal.  She also has a history of of tobacco abuse, mixed hyperlipidemia, Ménière's disease, family history of ischemic heart disease and COVID-19.    She called the office yesterday with complaints of abdominal pain and has a history of elevated triglycerides.  She was sent to ER to rule out pancreatitis.  She also admits to having left-sided numbness, and vague chest pain.    She has been to rule out for CVA on MRI.  Cardiac enzymes negative.  BNP minimally elevated.  Triglycerides 471.  Echo shows no significant abnormalities.    ________________________________  Review of systems: A 10 point review of system has been performed, and is negative except for what has been mentioned in the above history of present illness.     PAST MEDICAL & SURGICAL HISTORY:  High cholesterol      CA skin, basal cell      Spasmodic dysphonia      CAD (coronary artery disease)      Asthma      H/O  section  x 3      Multiple lipomas        FAMILY HISTORY:  No pertinent family history in first degree relatives         SOCIAL HISTORY: The patient denies any tobacco abuse, alcohol abuse or illicit drug use.    ALLERGIES:  rubber (Short breath; Swelling)  penicillin (Hives; Diarrhea)  cephalosporins (Hives; Diarrhea)  latex (Hives)    Home Medications:  albuterol 2.5 mg/3 mL (0.083%) inhalation solution: 3 milliliter(s) by nebulizer once a day (2024 21:12)  aspirin 81 mg oral tablet: 1 tab(s) orally once a day (2024 21:12)  carvedilol 6.25 mg oral tablet: 1 tab(s) orally 2 times a day (2024 21:12)  clonazePAM 0.5 mg oral tablet: 1 tab(s) orally once a day (2024 21:12)  cyclobenzaprine 10 mg oral tablet: 1 tab(s) orally once a day as needed for (2024 21:12)  EpiPen: ***as needed *** (2024 21:12)  Spiriva Respimat 10 ACT 2.5 mcg/inh inhalation aerosol: 2 puff(s) inhaled once a day (2024 21:12)  Valium 5 mg oral tablet: 1 tab(s) orally once a day as needed for (2024 21:12)    MEDICATIONS  (STANDING):  aspirin enteric coated 81 milliGRAM(s) Oral daily  atorvastatin 80 milliGRAM(s) Oral at bedtime  carvedilol 6.25 milliGRAM(s) Oral every 12 hours  clonazePAM  Tablet 0.5 milliGRAM(s) Oral daily  tiotropium 2.5 MICROgram(s) Inhaler 2 Puff(s) Inhalation daily    MEDICATIONS  (PRN):  acetaminophen     Tablet .. 650 milliGRAM(s) Oral every 6 hours PRN Temp greater or equal to 38C (100.4F), Mild Pain (1 - 3)  albuterol    0.083% 2.5 milliGRAM(s) Nebulizer every 4 hours PRN Shortness of Breath and/or Wheezing  aluminum hydroxide/magnesium hydroxide/simethicone Suspension 30 milliLiter(s) Oral every 4 hours PRN Dyspepsia  cyclobenzaprine 10 milliGRAM(s) Oral daily PRN Muscle Spasm  diazepam    Tablet 5 milliGRAM(s) Oral at bedtime PRN anxiety  melatonin 3 milliGRAM(s) Oral at bedtime PRN Insomnia  ondansetron Injectable 4 milliGRAM(s) IV Push every 8 hours PRN Nausea and/or Vomiting    Vital Signs Last 24 Hrs  T(C): 37.4 (2024 15:52), Max: 37.4 (2024 15:52)  T(F): 99.3 (2024 15:52), Max: 99.3 (2024 15:52)  HR: 91 (2024 15:52) (78 - 92)  BP: 127/76 (2024 15:52) (123/87 - 152/109)  BP(mean): 98 (2024 20:00) (98 - 122)  RR: 18 (2024 15:52) (18 - 22)  SpO2: 96% (2024 15:52) (96% - 100%)    Parameters below as of 2024 15:52  Patient On (Oxygen Delivery Method): room air      I&O's Summary    ________________________________  GENERAL APPEARANCE:  No acute distress  HEAD: normocephalic, atraumatic  NECK: supple, no jugular venous distention, no carotid bruit    HEART: Regular rate and rhythm, S1, S2 normal, 1/6 murmur    CHEST:  No anterior chest wall tenderness    LUNGS:  Clear to auscultation, without any wheezing, rhonchi or rales    ABDOMEN: soft, nontender, nondistended, with positive bowel sounds appreciated  EXTREMITIES: no edema.   NEURO: Alert and oriented x3  PSYC:  Normal affect  SKIN:  Dry  ________________________________   ECG:    LABS:                        14.4   6.82  )-----------( 167      ( 2024 12:48 )             39.7             -    141  |  110<H>  |  9   ----------------------------<  106<H>  3.5   |  28  |  0.75    Ca    9.2      2024 06:53  Mg     2.4         TPro  7.2  /  Alb  3.7  /  TBili  0.8  /  DBili  x   /  AST  109<H>  /  ALT  63  /  AlkPhos  85  25      Lipid Panel  Chl 234  HDL 58  LDL --  Trg 471  LIVER FUNCTIONS - ( 2024 14:00 )  Alb: 3.7 g/dL / Pro: 7.2 gm/dL / ALK PHOS: 85 U/L / ALT: 63 U/L / AST: 109 U/L / GGT: x         PT/INR - ( 2024 14:00 )   PT: 10.7 sec;   INR: 0.95 ratio         PTT - ( 2024 14:00 )  PTT:29.4 sec     @ 06:53  Trop-I  --  CK      92  CK-MB   --  Urinalysis Basic - ( 2024 06:53 )    Color: x / Appearance: x / SG: x / pH: x  Gluc: 106 mg/dL / Ketone: x  / Bili: x / Urobili: x   Blood: x / Protein: x / Nitrite: x   Leuk Esterase: x / RBC: x / WBC x   Sq Epi: x / Non Sq Epi: x / Bacteria: x      Pro BNP  --  @ 14:00  D Dimer  <150  @ 14:00    PT/INR - ( 2024 14:00 )   PT: 10.7 sec;   INR: 0.95 ratio         PTT - ( 2024 14:00 )  PTT:29.4 sec  Urinalysis Basic - ( 2024 06:53 )    Color: x / Appearance: x / SG: x / pH: x  Gluc: 106 mg/dL / Ketone: x  / Bili: x / Urobili: x   Blood: x / Protein: x / Nitrite: x   Leuk Esterase: x / RBC: x / WBC x   Sq Epi: x / Non Sq Epi: x / Bacteria: x    CARDIAC MARKERS ( 2024 06:53 )  x     / x     / 92 U/L / x     / x        ________________________________    RADIOLOGY & ADDITIONAL STUDIES: IMPRESSION: No acute intracranial hemorrhage or evidence of acute   ischemia.        1.   Right carotid system:  No hemodynamically significant stenosis.        2.   Left carotid system:  No hemodynamically significant stenosis.        3.   Intracranial circulation:  No significant vascular lesion.        4.   Brain:  No significant lesion identified.        5.   Perfusion: No core infarct or critically hypoperfused tissue at   risk is identified.    Patient admitted to the hospital for chest discomfort.  Echo shows no wall motion abnormalities.  Given history of CAD, and chest discomfort, recommend nuclear stress test.  Echocardiogram 2024   The left ventricle is normal in wall thickness, wall motion and   contractility.   The left ventricle cavity is at the lower limits of normal in size.   Estimated left ventricular ejection fraction is 60 %.   Normal appearing left atrium.   Normal appearing right atrium.   Normal appearing right ventricle structure and function.   The aortic valve is trileaflet with thin pliable leaflets.   The mitral valve leaflets appear thin and normal.   No evidence of pericardial effusion.      ________________________________    ASSESSMENT:      Chest discomfort-ruled out for ACS  Nonobstructive coronary artery disease on coronary angiogram from   History of WPW status post ablation  Nonobstructive carotid artery disease    PLAN:  In summary, this is a 58y Female with a past medical history of as above admitted for chest discomfort and facial numbness.  Ruled out for CVA.  No evidence to suggest ACS.  LVEF preserved on echo.  Continue carvedilol and statin therapy.  Continue aspirin.  Recommend outpatient nuclear stress test for history of chest discomfort given established CAD.    ____________________________________________  (Dragon Dictation software used). Thank you for allowing me to participate in the care of your patient. Please contact me should any questions arise.    JAYDEN Pineda DO, FACC  Office: [phone]     
CC; L sided weakness      HPI:  58-year-old patient with past medical history for  coronary artery disease, spasmodic dysphonia, cervical dystonia, distal peripheral neuropathy since her lyme disease diagnosis in the , HTN, Lyme disease, WPW status post ablation presented to the emergency department for left- sided numbness. Patient initially reports that she also has been having chest discomfort. Her left-sided numbness for the last several weeks.  However upon further questioning patient now states that the symptoms have been  becoming worse over the last 2 days.  Patient went and saw cardiology yesterday and recommended to go to the ER for further evaluation.  Neurology consulted for L sided numbness.  Denies any dysarthria, visual changes, unsteady gait, HA.  She does occasionally mixes up her words, loses her  suddenly, and has random involuntary twitching but these are all chronic.  She follows with Neurologist Dr. Curtis, and ENT Pito Chavez.  CT head, CTA/CTP is neg for acute bleed, ischemia, LVO      PAST MEDICAL & SURGICAL HISTORY:  High cholesterol      CA skin, basal cell      Spasmodic dysphonia      CAD (coronary artery disease)      Asthma      H/O  section  x 3      Multiple lipomas          FAMILY HISTORY:  No pertinent family history in first degree relatives        Social Hx:  Nonsmoker, no drug or alcohol use    MEDICATIONS  (STANDING):  aspirin enteric coated 81 milliGRAM(s) Oral daily  atorvastatin 80 milliGRAM(s) Oral at bedtime  carvedilol 6.25 milliGRAM(s) Oral every 12 hours  clonazePAM  Tablet 0.5 milliGRAM(s) Oral daily  tiotropium 2.5 MICROgram(s) Inhaler 2 Puff(s) Inhalation daily       Allergies  rubber (Short breath; Swelling)  penicillin (Hives; Diarrhea)  cephalosporins (Hives; Diarrhea)  latex (Hives)        ROS: Pertinent positives in HPI, all other ROS were reviewed and are negative.      Vital Signs Last 24 Hrs  T(C): 36.7 (2024 10:35), Max: 36.9 (2024 18:40)  T(F): 98.1 (2024 10:35), Max: 98.4 (2024 18:40)  HR: 87 (2024 10:53) (75 - 95)  BP: 141/70 (2024 10:35) (123/87 - 156/105)  BP(mean): 98 (2024 20:00) (98 - 122)  RR: 18 (2024 10:35) (16 - 22)  SpO2: 99% (2024 10:53) (96% - 100%)    Parameters below as of 2024 10:53  Patient On (Oxygen Delivery Method): room air        Constitutional: awake, NAD  HEAD: Normocephalic  Neck: Supple.  Extremities:  no edema  Musculoskeletal: no abnormal movements  Skin: No rashes    Neurological exam:  HF: A x O x 3. Appropriately interactive, normal affect. Speech fluent but with dysphonia, No Aphasia or paraphasic errors. Naming /repetition intact   CN: NICHOLE, EOMI, VFF, facial sensation normal, no NLFD, tongue midline, Palate moves equally, SCM equal bilaterally  Motor: No pronator drift, Strength 5/5 in all 4 ext, normal bulk and tone, no tremor, rigidity  Sens: Decreased light touch, temp sense, pinprick on L face, LUE.  Neg Davey's.  JS intact.  Reflexes:  BJ 1+, BR 1+, KJ absent, AJ absent, downgoing toes b/l  Coord:  No FNFA, dysmetria, HAFSA intact   Gait/Balance: Cannot test    NIHSS: 1          Labs:       141  |  110<H>  |  9   ----------------------------<  106<H>  3.5   |  28  |  0.75    Ca    9.2      2024 06:53  Mg     2.4         TPro  7.2  /  Alb  3.7  /  TBili  0.8  /  DBili  x   /  AST  109<H>  /  ALT  63  /  AlkPhos  85   Chol 234<H> LDL -- HDL 58 Trig 471<H>                          14.4   6.82  )-----------( 167      ( 2024 12:48 )             39.7       Radiology:  < from: CT Perfusion w/ Maps w/ IV Cont (24 @ 15:53) >  IMPRESSION:        1.   Right carotid system:  No hemodynamically significant stenosis.        2.   Left carotid system:  No hemodynamically significant stenosis.        3.   Intracranial circulation:  No significant vascular lesion.        4.   Brain:  No significant lesion identified.        5.   Perfusion: No core infarct or critically hypoperfused tissue at   risk is identified.    Echo: no acute findings

## 2024-01-27 NOTE — OCCUPATIONAL THERAPY INITIAL EVALUATION ADULT - NSACTIVITYREC_GEN_A_OT
Pt presents with impaired balance, endurance and muscle strength that will benefit from skilled OT to improve independence in ADLs, reduce fall risk and chance for readmission. Pt presenting with generalized weakness and difficulty standing. However earlier this week pt previously d/c'd from PT program as IND without WD- RN informed and is going to do throughout assessment- PT informed as well. Pt reports that her strength and endurance has varied like this in the past but doesn't know the reason. RN informed immediately and is going to do thorough assessment 24

## 2024-01-27 NOTE — OCCUPATIONAL THERAPY INITIAL EVALUATION ADULT - ADL RETRAINING, OT EVAL
Pt will improve UE strength by 1/2 grade in 2 weeks. Pt will improve toilet transfer to IND in 2 weeks. Pt will improve LB dressing to IND in 2 weeks. pt will improve grooming standing at the sink to IND in 2 weeks

## 2024-01-27 NOTE — CONSULT NOTE ADULT - ASSESSMENT
58-year-old patient with past medical history for  coronary artery disease, Lyme disease, WPW status post ablation presented to the emergency department for left- sided numbness. Patient initially reports that she also has been having chest discomfort. Her left-sided numbness for the last several weeks.  However upon further questioning patient now states that the symptoms have been  becoming worse over the last 2 days.  Patient went and saw cardiology and recommended to go to the ER for further evaluation. Concern raised for infection. had previous lyme and tick related diseases.     1. L sided numbness  - no clear infectious focus   - r/o tick borne illness babesia (-) f/u lyme  - observe off abx  - monitor temps  - supportive care  - fu cbc    2. other issues -care per medicine  58-year-old patient with past medical history for  coronary artery disease, Lyme disease, WPW status post ablation presented to the emergency department for left- sided numbness. Patient initially reports that she also has been having chest discomfort. Her left-sided numbness for the last several weeks.  However upon further questioning patient now states that the symptoms have been  becoming worse over the last 2 days.  Patient went and saw cardiology and recommended to go to the ER for further evaluation. Concern raised for infection. had previous lyme and tick related diseases.     1. L sided numbness. Asymptomatic bacteruria  - no clear infectious focus   - r/o tick borne illness babesia (-) f/u lyme  - ua negative urine cx 10-49 k enterococcus - not c/w UTI  - observe off abx  - monitor temps  - supportive care  - fu cbc    2. other issues -care per medicine

## 2024-01-27 NOTE — OCCUPATIONAL THERAPY INITIAL EVALUATION ADULT - HEALTH SCREEN CRITERIA
Problem: At Risk for Falls  Goal: # Patient does not fall  Outcome: Outcome Not Met, Continue to Monitor  Goal: # Takes action to control fall-related risks  Outcome: Outcome Not Met, Continue to Monitor  Goal: # Verbalizes understanding of fall risk/precautions  Description: Document education using the patient education activity  Outcome: Outcome Not Met, Continue to Monitor     Problem: At Risk for Injury Due to Fall  Goal: # Patient does not fall  Outcome: Outcome Not Met, Continue to Monitor  Goal: # Takes action to control condition specific risks  Outcome: Outcome Not Met, Continue to Monitor  Goal: # Verbalizes understanding of fall-related injury personal risks  Description: Document education using the patient education activity  Outcome: Outcome Not Met, Continue to Monitor      yes

## 2024-01-27 NOTE — OCCUPATIONAL THERAPY INITIAL EVALUATION ADULT - LIVES WITH, PROFILE
Pt reports living living with significant other with walk in shower, standard toilet, reports mostly IND prior, reported some difficulty with ADLs as presentation and endurance varies at times due to patient report/significant other

## 2024-01-28 ENCOUNTER — TRANSCRIPTION ENCOUNTER (OUTPATIENT)
Age: 59
End: 2024-01-28

## 2024-01-28 VITALS
TEMPERATURE: 98 F | OXYGEN SATURATION: 97 % | SYSTOLIC BLOOD PRESSURE: 101 MMHG | RESPIRATION RATE: 18 BRPM | HEART RATE: 81 BPM | DIASTOLIC BLOOD PRESSURE: 65 MMHG

## 2024-01-28 LAB
-  AMPICILLIN: SIGNIFICANT CHANGE UP
-  CIPROFLOXACIN: SIGNIFICANT CHANGE UP
-  LEVOFLOXACIN: SIGNIFICANT CHANGE UP
-  NITROFURANTOIN: SIGNIFICANT CHANGE UP
-  TETRACYCLINE: SIGNIFICANT CHANGE UP
-  VANCOMYCIN: SIGNIFICANT CHANGE UP
CULTURE RESULTS: ABNORMAL
METHOD TYPE: SIGNIFICANT CHANGE UP
ORGANISM # SPEC MICROSCOPIC CNT: ABNORMAL
ORGANISM # SPEC MICROSCOPIC CNT: SIGNIFICANT CHANGE UP
SPECIMEN SOURCE: SIGNIFICANT CHANGE UP

## 2024-01-28 PROCEDURE — 72156 MRI NECK SPINE W/O & W/DYE: CPT | Mod: 26

## 2024-01-28 PROCEDURE — 72158 MRI LUMBAR SPINE W/O & W/DYE: CPT | Mod: 26

## 2024-01-28 PROCEDURE — 72157 MRI CHEST SPINE W/O & W/DYE: CPT | Mod: 26

## 2024-01-28 PROCEDURE — 99239 HOSP IP/OBS DSCHRG MGMT >30: CPT

## 2024-01-28 RX ORDER — POLYETHYLENE GLYCOL 3350 17 G/17G
17 POWDER, FOR SOLUTION ORAL DAILY
Refills: 0 | Status: DISCONTINUED | OUTPATIENT
Start: 2024-01-28 | End: 2024-01-28

## 2024-01-28 RX ORDER — ACETAMINOPHEN 500 MG
2 TABLET ORAL
Qty: 0 | Refills: 0 | DISCHARGE
Start: 2024-01-28

## 2024-01-28 RX ORDER — ATORVASTATIN CALCIUM 80 MG/1
1 TABLET, FILM COATED ORAL
Qty: 30 | Refills: 0
Start: 2024-01-28 | End: 2024-02-26

## 2024-01-28 RX ADMIN — Medication 81 MILLIGRAM(S): at 10:30

## 2024-01-28 RX ADMIN — Medication 1 MILLIGRAM(S): at 08:15

## 2024-01-28 RX ADMIN — Medication 0.5 MILLIGRAM(S): at 10:30

## 2024-01-28 RX ADMIN — Medication 650 MILLIGRAM(S): at 13:58

## 2024-01-28 RX ADMIN — POLYETHYLENE GLYCOL 3350 17 GRAM(S): 17 POWDER, FOR SOLUTION ORAL at 06:26

## 2024-01-28 RX ADMIN — Medication 650 MILLIGRAM(S): at 14:30

## 2024-01-28 RX ADMIN — TIOTROPIUM BROMIDE 2 PUFF(S): 18 CAPSULE ORAL; RESPIRATORY (INHALATION) at 10:10

## 2024-01-28 RX ADMIN — CARVEDILOL PHOSPHATE 6.25 MILLIGRAM(S): 80 CAPSULE, EXTENDED RELEASE ORAL at 10:30

## 2024-01-28 NOTE — DISCHARGE NOTE PROVIDER - PROVIDER TOKENS
PROVIDER:[TOKEN:[[id number]],FOLLOWUP:[1 week]],PROVIDER:[TOKEN:[[id number]],FOLLOWUP:[1 week]],PROVIDER:[TOKEN:[[id number]],FOLLOWUP:[1 week]] PROVIDER:[TOKEN:[[id number]],FOLLOWUP:[1 week]],PROVIDER:[TOKEN:[[id number]],FOLLOWUP:[1 week]],PROVIDER:[TOKEN:[[id number]],FOLLOWUP:[1 week]],PROVIDER:[TOKEN:[[id number]],FOLLOWUP:[1 week]]

## 2024-01-28 NOTE — DISCHARGE NOTE NURSING/CASE MANAGEMENT/SOCIAL WORK - PATIENT PORTAL LINK FT
You can access the FollowMyHealth Patient Portal offered by [facility] by registering at the following website: [email or web address]. By joining [facility]’s FollowMyHealth portal, you will also be able to view your health information using other applications (apps) compatible with our system.

## 2024-01-28 NOTE — DISCHARGE NOTE PROVIDER - DID THE PATIENT PRESENT WITH OR WAS TREATED FOR MALNUTRITION DURING THIS ADMISSION
On Call Phone Message:  Call from [email or web address], [phone]) at 2:20 am to report serum glucose critically high at 505. Can be addressed this morning during business hours.
No

## 2024-01-28 NOTE — DISCHARGE NOTE PROVIDER - HOSPITAL COURSE
Chief complain : left sided numbness     HPI:  58-year-old patient with past medical history for  coronary artery disease, Lyme disease, WPW status post ablation presented to the emergency department on 1/25/24  for left- sided numbness. Patient initially reports that she also has been having chest discomfort. Her left-sided numbness for the last several weeks.  However upon further questioning patient now states that the symptoms have been  becoming worse over the last 2 days.  Patient went and saw cardiology yesterday and recommended to go to the ER for further evaluation.  1/26-   Patient seen and examined at bedside earlier today, left sided numbness persist but slightly better, has midsternal pressure like chest pain, denies cough, dyspnea, abdominal pain, urinary  symptoms, tolerating po intake   1/27 - + reports worsening of the leg weakness , numbness, continues to have chest pain, denies palpitations, headaches, afebrile, requesting carotid doppler  1/28 - no events, all results of the test discussed, patient still report chest discomfort, leg numbness, weakness, tolerating po intake  Review of system- Rest of the review of system are negative except mentioned in HPI  Vital sings reviewed for last 24 h  T(C): 36.6 (01-28-24 @ 08:36), Max: 36.9 (01-27-24 @ 16:08)  T(F): 97.8 (01-28-24 @ 08:36), Max: 98.5 (01-27-24 @ 16:08)  HR: 87 (01-28-24 @ 08:36) (78 - 95)  BP: 108/71 (01-28-24 @ 08:36) (101/70 - 118/80)  RR: 18 (01-28-24 @ 08:36) (18 - 18)  SpO2: 99% (01-28-24 @ 08:36) (97% - 99%)  Physical exam:   General : NAD, appear to be of stated age , well groomed   NERVOUS SYSTEM:  Alert & Oriented X3, non- focal exam, Motor Strength 5/5 B/L upper and lower extremities; DTRs 2+ intact and symmetric, decreased sensation over left side of the face, arm and leg  HEAD:  Atraumatic, Normocephalic  EYES: EOMI, PERRLA, conjunctiva and sclera clear  HEENT: Moist mucous membranes, Supple neck , No JVD  CHEST: Clear to auscultation bilaterally; No rales, no rhonchi, no wheezing  HEART: Regular rate and rhythm; No murmurs, no rubs or gallops  ABDOMEN: Soft, Non-tender, Non-distended; Bowel sounds present, no guarding , no peritoneal irritation   GENITOURINARY- Voiding, no suprapubic tenderness  EXTREMITIES:  2+ Peripheral Pulses, No clubbing, cyanosis,   edema  MUSCULOSKELETAL:- No muscle tenderness, Muscle tone normal, No joint tenderness, no Joint swelling,  Joint ROM –normal   SKIN-no rash, no lesion    Labs radiologic and other test : all reviewed and interpreted :     Left sided numbness ruled out CVA , h/o Lyme disease   - CT head and MR head - neg  - troponinx2 neg, 2 d echo - neg , no events   - TSH, B12 wnl, check folate, vit D wnl , CESAR -pending, CRP, ESR wnl , Lyme negative  - c/w  aspirin and statin  - Neurology consult - could be a peripheral neuropathy, including brachial plexopathy, but she does not have any complaint of pain.    Will need further workup as an outpatient, including EMG and possible cervical spine imaging.   No further inpatient neurology recommendations at this time.   - Cardiology consult - Nonobstructive CAD,  on coronary angiogram from 2015, h/o WPW status post ablation - o/p NST   -  PT/OT/Speech eval  - MR of the spine to r/o acute pathology    h/o Lyme with  numbness , + positive urine culture with Enterococcus , asymptomatic bacteruria   Antibiotics allergies  - tick panel, [place], ID consult - Lyme disease  ruled out   - f/u urine culture sensitivities  - no abx recomended by ID  -  CRP, ESR, procalcitonin noted     Atypical Chest pain ruled out ACS   Non-obstructive CAD   -troponin -neg, no acute EKG change  -cardiology consult - o/p NST   - carotid doppler - ? stenosis reevaluation  - lidocaine patch topically advised OTC    HLD   - c/w atorvastatin     SCD for DVT ppx    Code status -full code     Final diagnosis, treatment plan, and follow-up recommendations were discussed and explained to the patient.   The patient was given an opportunity to ask questions concerning the diagnosis and treatment plan.   The patient acknowledged understanding of the diagnosis, treatment, and follow-up recommendations.   The patient was advised to seek urgent care upon discharge if worsening symptoms develop prior to scheduled follow-up.   Time spent on discharge included time with the patient, and also coordinating discharge care as outlined below.  Discharge note faxed to PCP with my contact information to call me back   PCP Dr. Tom rojas  Total time spent: 50 min

## 2024-01-28 NOTE — DISCHARGE NOTE PROVIDER - CARE PROVIDER_API CALL
Damien Zaman  Neurology  [address] [facility] [address]  [place] [address]  Phone: [phone]  Fax: [phone]  Follow Up Time: 1 week    Linda Childers  Internal Medicine  [address] [place] [id number]  Phone: [phone]  Fax: [phone]  Follow Up Time: 1 week    Ej Khan  Internal Medicine  [facility] [place] [address]  Phone: [phone]  Fax: [phone]  Follow Up Time: 1 week   Damien Zaman  Neurology  [address] [facility]  [place] [address]  Phone: [phone]  Fax: [phone]  Follow Up Time: 1 week    Linda Childers  Internal Medicine  [address] [place] [address]  Phone: [phone]  Fax: [phone]  Follow Up Time: 1 week    Ej Khan  Internal Medicine  [address] [place] [address]  Phone: [phone]  Fax: [phone]  Follow Up Time: 1 week    Jenna Pineda  Cardiology  [address] [place] [address]  Phone: [phone]  Fax: [phone]  Follow Up Time: 1 week

## 2024-01-28 NOTE — DISCHARGE NOTE PROVIDER - NSDCCPTREATMENT_GEN_ALL_CORE_FT
PRINCIPAL PROCEDURE  Procedure: MRI head  Findings and Treatment: FINDINGS: The brain parenchyma is normal in signal and morphology. There   is no evidence of acute ischemia on the diffusion-weighted images.  Ventricular size and configuration is unremarkable. No abnormal extra   axial fluid collections are noted. Flow-voids are noted throughout the   major intracranial vessels, on the T2 weighted images, consistent with   their patency. The sella turcica and posterior fossa are unremarkable.  The paranasal sinuses and mastoid air cells are clear. Calvarial signal   is within normal limits. The orbits appear unremarkable.  IMPRESSION: No acute intracranial hemorrhage or evidence of acute   ischemia.        SECONDARY PROCEDURE  Procedure: MR cervical spine  Findings and Treatment: MRI CERVICAL SPINE:  1.  No evidence of significant spinal canal narrowing or abnormal cord   signal.  2.  Multilevel degenerative change of the cervical spine as discussed   above.  MRI THORACIC SPINE:  1.  No evidence of significant spinal canal narrowing or abnormal cord   signal.  MRI LUMBAR SPINE:  1.  No evidence of significant spinal canal narrowing or abnormal cord   signal.  2.  Mild degenerative changes of the lower lumbar spine.      Procedure: US doppler carotid bilateral  Findings and Treatment:   FINDINGS:  No elevated velocities or abnormal waveforms are encountered. No   significant plaque or stenosis.  Peak systolic velocities are as follows:  RIGHT:  PROX CCA = 133 cm/s  DIST CCA = 74 cm/s  PROX ICA = 55 cm/s  DIST ICA = 102 cm/s  ECA = 74 cm/s  LEFT:  PROX CCA = 98 cm/s  DIST CCA = 75 cm/s  PROX ICA = 58 cm/s  DIST ICA = 87 cm/s  ECA = 112 cm/s  Antegrade flow is noted within both vertebral arteries.  IMPRESSION: No significant hemodynamic stenosis of either carotid artery.  Measurement of carotid stenosis is based on velocity parameters that   correlate the residual internal carotid diameter with that of the more   distal vessel in accordance with a method such as the North American   Symptomatic Carotid Endarterectomy Trial (NASCET).      Procedure: 2D echo  Findings and Treatment: The left ventricle is normal in wall thickness, wall motion and   contractility.   The left ventricle cavity is at the lower limits of normal in size.   Estimated left ventricular ejection fraction is 60 %.   Normal appearing left atrium.   Normal appearing right atrium.   Normal appearing right ventricle structure and function.   The aortic valve is trileaflet with thin pliable leaflets.   The mitral valve leaflets appear thin and normal.   No evidence of pericardial effusion.

## 2024-01-28 NOTE — DISCHARGE NOTE PROVIDER - NSDCCPCAREPLAN_GEN_ALL_CORE_FT
PRINCIPAL DISCHARGE DIAGNOSIS  Diagnosis: Leg numbness  Assessment and Plan of Treatment: ruled out stroke, Lyme disease   follow up with neurologist within 1 week for further work-up and testing    including EMG and possible movement disorder specialist consultation  Dr. Zaman   establish PCP Dr. Childers recomended      SECONDARY DISCHARGE DIAGNOSES  Diagnosis: Chest pain  Assessment and Plan of Treatment: ruled out heart attack, follow up with Dr. Pineda for further monitoring of CAD, continue home medications    Diagnosis: Weakness  Assessment and Plan of Treatment: physical therapy daily  , home  or outpatient PT advised    Diagnosis: Hyperlipidemia  Assessment and Plan of Treatment: continue atorvastatin daily at bedtime, check liver function test in 1-2 weeks follow up with PCP within 1 week for furt her preventive care

## 2024-01-28 NOTE — DISCHARGE NOTE PROVIDER - CARE PROVIDERS DIRECT ADDRESSES
[email or web address]

## 2024-01-28 NOTE — DISCHARGE NOTE PROVIDER - NSDCMRMEDTOKEN_GEN_ALL_CORE_FT
acetaminophen 325 mg oral tablet: 2 tab(s) orally every 6 hours As needed Temp greater or equal to 38C (100.4F), Mild Pain (1 - 3)  albuterol 2.5 mg/3 mL (0.083%) inhalation solution: 3 milliliter(s) by nebulizer once a day  aspirin 81 mg oral tablet: 1 tab(s) orally once a day  atorvastatin 80 mg oral tablet: 1 tab(s) orally once a day (at bedtime)  carvedilol 6.25 mg oral tablet: 1 tab(s) orally 2 times a day  clonazePAM 0.5 mg oral tablet: 1 tab(s) orally once a day  cyclobenzaprine 10 mg oral tablet: 1 tab(s) orally once a day as needed for  Spiriva Respimat 10 ACT 2.5 mcg/inh inhalation aerosol: 2 puff(s) inhaled once a day  Valium 5 mg oral tablet: 1 tab(s) orally once a day as needed for

## 2024-01-29 LAB — ANA TITR SER: NEGATIVE — SIGNIFICANT CHANGE UP

## 2024-01-30 LAB
A PHAGOCYTOPH DNA BLD QL NAA+PROBE: NEGATIVE — SIGNIFICANT CHANGE UP
E CHAFFEENSIS DNA BLD QL NAA+PROBE: NEGATIVE — SIGNIFICANT CHANGE UP
E EWINGII DNA SPEC QL NAA+PROBE: NEGATIVE — SIGNIFICANT CHANGE UP
EHRLICHIA DNA SPEC QL NAA+PROBE: NEGATIVE — SIGNIFICANT CHANGE UP
R RICKETTSI AB SER-ACNC: SIGNIFICANT CHANGE UP
R RICKETTSI IGM SER-ACNC: SIGNIFICANT CHANGE UP
RICK SF IGG TITR SER IF: SIGNIFICANT CHANGE UP
RICK SF IGM TITR SER IF: SIGNIFICANT CHANGE UP
WNV IGG TITR FLD: NEGATIVE — SIGNIFICANT CHANGE UP
WNV IGM SPEC QL: NEGATIVE — SIGNIFICANT CHANGE UP

## 2024-02-05 DIAGNOSIS — R07.89 OTHER CHEST PAIN: ICD-10-CM

## 2024-02-05 DIAGNOSIS — Z88.0 ALLERGY STATUS TO PENICILLIN: ICD-10-CM

## 2024-02-05 DIAGNOSIS — B95.2 ENTEROCOCCUS AS THE CAUSE OF DISEASES CLASSIFIED ELSEWHERE: ICD-10-CM

## 2024-02-05 DIAGNOSIS — R82.71 BACTERIURIA: ICD-10-CM

## 2024-02-05 DIAGNOSIS — I77.89 OTHER SPECIFIED DISORDERS OF ARTERIES AND ARTERIOLES: ICD-10-CM

## 2024-02-05 DIAGNOSIS — Z91.09 OTHER ALLERGY STATUS, OTHER THAN TO DRUGS AND BIOLOGICAL SUBSTANCES: ICD-10-CM

## 2024-02-05 DIAGNOSIS — Z88.1 ALLERGY STATUS TO OTHER ANTIBIOTIC AGENTS STATUS: ICD-10-CM

## 2024-02-05 DIAGNOSIS — G62.9 POLYNEUROPATHY, UNSPECIFIED: ICD-10-CM

## 2024-02-05 DIAGNOSIS — Z85.828 PERSONAL HISTORY OF OTHER MALIGNANT NEOPLASM OF SKIN: ICD-10-CM

## 2024-02-05 DIAGNOSIS — Z86.16 PERSONAL HISTORY OF COVID-19: ICD-10-CM

## 2024-02-05 DIAGNOSIS — E78.2 MIXED HYPERLIPIDEMIA: ICD-10-CM

## 2024-02-05 DIAGNOSIS — Z11.52 ENCOUNTER FOR SCREENING FOR COVID-19: ICD-10-CM

## 2024-02-05 DIAGNOSIS — J45.909 UNSPECIFIED ASTHMA, UNCOMPLICATED: ICD-10-CM

## 2024-02-05 DIAGNOSIS — Z79.899 OTHER LONG TERM (CURRENT) DRUG THERAPY: ICD-10-CM

## 2024-02-05 DIAGNOSIS — G24.3 SPASMODIC TORTICOLLIS: ICD-10-CM

## 2024-02-05 DIAGNOSIS — I25.10 ATHEROSCLEROTIC HEART DISEASE OF NATIVE CORONARY ARTERY WITHOUT ANGINA PECTORIS: ICD-10-CM

## 2024-02-05 DIAGNOSIS — Z79.82 LONG TERM (CURRENT) USE OF ASPIRIN: ICD-10-CM

## 2024-02-05 DIAGNOSIS — Z91.018 ALLERGY TO OTHER FOODS: ICD-10-CM

## 2024-02-05 DIAGNOSIS — Z91.040 LATEX ALLERGY STATUS: ICD-10-CM

## 2024-06-12 ENCOUNTER — OFFICE (OUTPATIENT)
Dept: URBAN - METROPOLITAN AREA CLINIC 12 | Facility: CLINIC | Age: 59
Setting detail: OPHTHALMOLOGY
End: 2024-06-12
Payer: MEDICAID

## 2024-06-12 DIAGNOSIS — H25.13: ICD-10-CM

## 2024-06-12 DIAGNOSIS — H43.393: ICD-10-CM

## 2024-06-12 DIAGNOSIS — H18.413: ICD-10-CM

## 2024-06-12 DIAGNOSIS — H40.013: ICD-10-CM

## 2024-06-12 DIAGNOSIS — H04.123: ICD-10-CM

## 2024-06-12 DIAGNOSIS — H35.711: ICD-10-CM

## 2024-06-12 DIAGNOSIS — E11.9: ICD-10-CM

## 2024-06-12 PROBLEM — H35.713 CENTRAL SEROUS RETINOPATHY; BOTH EYES: Status: ACTIVE | Noted: 2024-06-12

## 2024-06-12 PROCEDURE — 92012 INTRM OPH EXAM EST PATIENT: CPT | Performed by: OPHTHALMOLOGY

## 2024-06-12 PROCEDURE — 92083 EXTENDED VISUAL FIELD XM: CPT | Performed by: OPHTHALMOLOGY

## 2024-06-12 PROCEDURE — 92020 GONIOSCOPY: CPT | Performed by: OPHTHALMOLOGY

## 2024-06-12 PROCEDURE — 92133 CPTRZD OPH DX IMG PST SGM ON: CPT | Performed by: OPHTHALMOLOGY

## 2024-06-12 PROCEDURE — 76514 ECHO EXAM OF EYE THICKNESS: CPT | Performed by: OPHTHALMOLOGY

## 2024-06-12 ASSESSMENT — CONFRONTATIONAL VISUAL FIELD TEST (CVF)
OS_FINDINGS: FULL
OD_FINDINGS: FULL

## 2024-06-12 ASSESSMENT — LID POSITION - PTOSIS
OS_PTOSIS: 1+
OD_PTOSIS: 1+

## 2024-07-09 ENCOUNTER — APPOINTMENT (OUTPATIENT)
Dept: NEUROSURGERY | Facility: CLINIC | Age: 59
End: 2024-07-09

## 2024-08-28 ENCOUNTER — INPATIENT (INPATIENT)
Facility: HOSPITAL | Age: 59
LOS: 7 days | Discharge: NURSING, REHAB OR LONG-TERM CARE | DRG: 998 | End: 2024-09-05
Attending: STUDENT IN AN ORGANIZED HEALTH CARE EDUCATION/TRAINING PROGRAM | Admitting: STUDENT IN AN ORGANIZED HEALTH CARE EDUCATION/TRAINING PROGRAM
Payer: MEDICAID

## 2024-08-28 VITALS
RESPIRATION RATE: 18 BRPM | DIASTOLIC BLOOD PRESSURE: 86 MMHG | HEART RATE: 106 BPM | TEMPERATURE: 98 F | SYSTOLIC BLOOD PRESSURE: 132 MMHG | OXYGEN SATURATION: 98 %

## 2024-08-28 DIAGNOSIS — D17.9 BENIGN LIPOMATOUS NEOPLASM, UNSPECIFIED: Chronic | ICD-10-CM

## 2024-08-28 DIAGNOSIS — Z98.891 HISTORY OF UTERINE SCAR FROM PREVIOUS SURGERY: Chronic | ICD-10-CM

## 2024-08-28 LAB
ALBUMIN SERPL ELPH-MCNC: 4.2 G/DL — SIGNIFICANT CHANGE UP (ref 3.3–5.2)
ALBUMIN SERPL ELPH-MCNC: 4.3 G/DL — SIGNIFICANT CHANGE UP (ref 3.3–5.2)
ALP SERPL-CCNC: 102 U/L — SIGNIFICANT CHANGE UP (ref 40–120)
ALP SERPL-CCNC: 103 U/L — SIGNIFICANT CHANGE UP (ref 40–120)
ALT FLD-CCNC: 38 U/L — HIGH
ALT FLD-CCNC: 38 U/L — HIGH
ANION GAP SERPL CALC-SCNC: 16 MMOL/L — SIGNIFICANT CHANGE UP (ref 5–17)
ANION GAP SERPL CALC-SCNC: 19 MMOL/L — HIGH (ref 5–17)
ANISOCYTOSIS BLD QL: SLIGHT — SIGNIFICANT CHANGE UP
APTT BLD: 28.5 SEC — SIGNIFICANT CHANGE UP (ref 24.5–35.6)
AST SERPL-CCNC: 74 U/L — HIGH
AST SERPL-CCNC: 74 U/L — HIGH
BASOPHILS # BLD AUTO: 0.06 K/UL — SIGNIFICANT CHANGE UP (ref 0–0.2)
BASOPHILS NFR BLD AUTO: 0.9 % — SIGNIFICANT CHANGE UP (ref 0–2)
BILIRUB DIRECT SERPL-MCNC: 0.3 MG/DL — SIGNIFICANT CHANGE UP (ref 0–0.3)
BILIRUB INDIRECT FLD-MCNC: 0.7 MG/DL — SIGNIFICANT CHANGE UP (ref 0.2–1)
BILIRUB SERPL-MCNC: 1 MG/DL — SIGNIFICANT CHANGE UP (ref 0.4–2)
BILIRUB SERPL-MCNC: 1 MG/DL — SIGNIFICANT CHANGE UP (ref 0.4–2)
BLD GP AB SCN SERPL QL: SIGNIFICANT CHANGE UP
BUN SERPL-MCNC: 10.6 MG/DL — SIGNIFICANT CHANGE UP (ref 8–20)
BUN SERPL-MCNC: 10.9 MG/DL — SIGNIFICANT CHANGE UP (ref 8–20)
CALCIUM SERPL-MCNC: 9.7 MG/DL — SIGNIFICANT CHANGE UP (ref 8.4–10.5)
CALCIUM SERPL-MCNC: 9.7 MG/DL — SIGNIFICANT CHANGE UP (ref 8.4–10.5)
CHLORIDE SERPL-SCNC: 92 MMOL/L — LOW (ref 96–108)
CHLORIDE SERPL-SCNC: 93 MMOL/L — LOW (ref 96–108)
CO2 SERPL-SCNC: 19 MMOL/L — LOW (ref 22–29)
CO2 SERPL-SCNC: 20 MMOL/L — LOW (ref 22–29)
CREAT SERPL-MCNC: 0.83 MG/DL — SIGNIFICANT CHANGE UP (ref 0.5–1.3)
CREAT SERPL-MCNC: 0.84 MG/DL — SIGNIFICANT CHANGE UP (ref 0.5–1.3)
EGFR: 81 ML/MIN/1.73M2 — SIGNIFICANT CHANGE UP
EGFR: 82 ML/MIN/1.73M2 — SIGNIFICANT CHANGE UP
EOSINOPHIL # BLD AUTO: 0.06 K/UL — SIGNIFICANT CHANGE UP (ref 0–0.5)
EOSINOPHIL NFR BLD AUTO: 0.9 % — SIGNIFICANT CHANGE UP (ref 0–6)
GIANT PLATELETS BLD QL SMEAR: PRESENT — SIGNIFICANT CHANGE UP
GLUCOSE SERPL-MCNC: 83 MG/DL — SIGNIFICANT CHANGE UP (ref 70–99)
GLUCOSE SERPL-MCNC: 84 MG/DL — SIGNIFICANT CHANGE UP (ref 70–99)
HCT VFR BLD CALC: 29.2 % — LOW (ref 34.5–45)
HGB BLD-MCNC: 10.6 G/DL — LOW (ref 11.5–15.5)
INR BLD: 0.97 RATIO — SIGNIFICANT CHANGE UP (ref 0.85–1.18)
LYMPHOCYTES # BLD AUTO: 0.55 K/UL — LOW (ref 1–3.3)
LYMPHOCYTES # BLD AUTO: 8.7 % — LOW (ref 13–44)
MACROCYTES BLD QL: SLIGHT — SIGNIFICANT CHANGE UP
MAGNESIUM SERPL-MCNC: 1.8 MG/DL — SIGNIFICANT CHANGE UP (ref 1.6–2.6)
MANUAL SMEAR VERIFICATION: SIGNIFICANT CHANGE UP
MCHC RBC-ENTMCNC: 35.1 PG — HIGH (ref 27–34)
MCHC RBC-ENTMCNC: 36.3 GM/DL — HIGH (ref 32–36)
MCV RBC AUTO: 96.7 FL — SIGNIFICANT CHANGE UP (ref 80–100)
METAMYELOCYTES # FLD: 0.9 % — HIGH (ref 0–0)
MONOCYTES # BLD AUTO: 0.44 K/UL — SIGNIFICANT CHANGE UP (ref 0–0.9)
MONOCYTES NFR BLD AUTO: 7 % — SIGNIFICANT CHANGE UP (ref 2–14)
NEUTROPHILS # BLD AUTO: 5.18 K/UL — SIGNIFICANT CHANGE UP (ref 1.8–7.4)
NEUTROPHILS NFR BLD AUTO: 81.6 % — HIGH (ref 43–77)
NRBC # BLD: 1 /100 WBCS — HIGH (ref 0–0)
PHOSPHATE SERPL-MCNC: 0.8 MG/DL — CRITICAL LOW (ref 2.4–4.7)
PLAT MORPH BLD: NORMAL — SIGNIFICANT CHANGE UP
PLATELET # BLD AUTO: 86 K/UL — LOW (ref 150–400)
POLYCHROMASIA BLD QL SMEAR: SIGNIFICANT CHANGE UP
POTASSIUM SERPL-MCNC: 3.2 MMOL/L — LOW (ref 3.5–5.3)
POTASSIUM SERPL-MCNC: 3.3 MMOL/L — LOW (ref 3.5–5.3)
POTASSIUM SERPL-SCNC: 3.2 MMOL/L — LOW (ref 3.5–5.3)
POTASSIUM SERPL-SCNC: 3.3 MMOL/L — LOW (ref 3.5–5.3)
PROT SERPL-MCNC: 7.1 G/DL — SIGNIFICANT CHANGE UP (ref 6.6–8.7)
PROT SERPL-MCNC: 7.2 G/DL — SIGNIFICANT CHANGE UP (ref 6.6–8.7)
PROTHROM AB SERPL-ACNC: 10.8 SEC — SIGNIFICANT CHANGE UP (ref 9.5–13)
RBC # BLD: 3.02 M/UL — LOW (ref 3.8–5.2)
RBC # FLD: 13.3 % — SIGNIFICANT CHANGE UP (ref 10.3–14.5)
RBC BLD AUTO: ABNORMAL
SODIUM SERPL-SCNC: 128 MMOL/L — LOW (ref 135–145)
SODIUM SERPL-SCNC: 131 MMOL/L — LOW (ref 135–145)
WBC # BLD: 6.35 K/UL — SIGNIFICANT CHANGE UP (ref 3.8–10.5)
WBC # FLD AUTO: 6.35 K/UL — SIGNIFICANT CHANGE UP (ref 3.8–10.5)

## 2024-08-28 PROCEDURE — 72170 X-RAY EXAM OF PELVIS: CPT | Mod: 26

## 2024-08-28 PROCEDURE — 99285 EMERGENCY DEPT VISIT HI MDM: CPT

## 2024-08-28 RX ORDER — ACETAMINOPHEN 325 MG/1
1000 TABLET ORAL EVERY 6 HOURS
Refills: 0 | Status: DISCONTINUED | OUTPATIENT
Start: 2024-08-28 | End: 2024-08-29

## 2024-08-28 RX ORDER — GABAPENTIN 100 MG
300 CAPSULE ORAL EVERY 8 HOURS
Refills: 0 | Status: DISCONTINUED | OUTPATIENT
Start: 2024-08-28 | End: 2024-08-31

## 2024-08-28 RX ORDER — CHLORHEXIDINE GLUCONATE 40 MG/ML
1 SOLUTION TOPICAL DAILY
Refills: 0 | Status: DISCONTINUED | OUTPATIENT
Start: 2024-08-28 | End: 2024-08-30

## 2024-08-28 RX ORDER — LEVETIRACETAM 1000 MG/1
500 TABLET ORAL EVERY 12 HOURS
Refills: 0 | Status: DISCONTINUED | OUTPATIENT
Start: 2024-08-28 | End: 2024-09-04

## 2024-08-28 RX ORDER — TIOTROPIUM BROMIDE INHALATION SPRAY 3.12 UG/1
2 SPRAY, METERED RESPIRATORY (INHALATION) DAILY
Refills: 0 | Status: DISCONTINUED | OUTPATIENT
Start: 2024-08-28 | End: 2024-09-05

## 2024-08-28 RX ORDER — CARVEDILOL 6.25 MG/1
6.25 TABLET ORAL EVERY 12 HOURS
Refills: 0 | Status: DISCONTINUED | OUTPATIENT
Start: 2024-08-28 | End: 2024-08-29

## 2024-08-28 RX ORDER — SODIUM CHLORIDE 9 MG/ML
1000 INJECTION INTRAMUSCULAR; INTRAVENOUS; SUBCUTANEOUS ONCE
Refills: 0 | Status: COMPLETED | OUTPATIENT
Start: 2024-08-28 | End: 2024-08-28

## 2024-08-28 RX ORDER — FAMOTIDINE 10 MG/ML
20 INJECTION INTRAVENOUS ONCE
Refills: 0 | Status: COMPLETED | OUTPATIENT
Start: 2024-08-28 | End: 2024-08-28

## 2024-08-28 RX ORDER — ACETAMINOPHEN 325 MG/1
1000 TABLET ORAL ONCE
Refills: 0 | Status: COMPLETED | OUTPATIENT
Start: 2024-08-28 | End: 2024-08-28

## 2024-08-28 RX ORDER — THIAMINE HCL 250 MG
500 TABLET ORAL DAILY
Refills: 0 | Status: COMPLETED | OUTPATIENT
Start: 2024-08-28 | End: 2024-08-31

## 2024-08-28 RX ORDER — FOLIC ACID 1 MG
1 TABLET ORAL DAILY
Refills: 0 | Status: DISCONTINUED | OUTPATIENT
Start: 2024-08-28 | End: 2024-08-29

## 2024-08-28 RX ORDER — DILTIAZEM HYDROCHLORIDE 5 MG/ML
120 INJECTION INTRAVENOUS DAILY
Refills: 0 | Status: DISCONTINUED | OUTPATIENT
Start: 2024-08-28 | End: 2024-08-29

## 2024-08-28 RX ORDER — SODIUM CHLORIDE 9 MG/ML
1000 INJECTION INTRAMUSCULAR; INTRAVENOUS; SUBCUTANEOUS
Refills: 0 | Status: DISCONTINUED | OUTPATIENT
Start: 2024-08-28 | End: 2024-08-29

## 2024-08-28 RX ADMIN — LEVETIRACETAM 500 MILLIGRAM(S): 1000 TABLET ORAL at 22:49

## 2024-08-28 RX ADMIN — ACETAMINOPHEN 1000 MILLIGRAM(S): 325 TABLET ORAL at 21:16

## 2024-08-28 RX ADMIN — SODIUM CHLORIDE 1000 MILLILITER(S): 9 INJECTION INTRAMUSCULAR; INTRAVENOUS; SUBCUTANEOUS at 20:35

## 2024-08-28 RX ADMIN — FAMOTIDINE 20 MILLIGRAM(S): 10 INJECTION INTRAVENOUS at 20:35

## 2024-08-28 RX ADMIN — ACETAMINOPHEN 400 MILLIGRAM(S): 325 TABLET ORAL at 20:34

## 2024-08-28 RX ADMIN — SODIUM CHLORIDE 1000 MILLILITER(S): 9 INJECTION INTRAMUSCULAR; INTRAVENOUS; SUBCUTANEOUS at 22:49
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
## 2024-08-28 NOTE — CONSULT NOTE ADULT - SUBJECTIVE AND OBJECTIVE BOX
Patient is a 59y old  Female who presents with a chief complaint of multiple episode of passing out    HISTORY OF PRESENT ILLNESS:   58 yo female with a pmhx of CAD on ASA, asthma, lipomas, HLD who presents as a transfer from [facility] with complaints of leg pain and a headache. Patient states that over the last week she has had multiple episodes of passing out. Patient states that last head strike was yesterday. She reports a ***/10 headache at this time. No other complaints at this time.     PAST MEDICAL & SURGICAL HISTORY:  High cholesterol  CA skin, basal cell  Spasmodic dysphonia  CAD (coronary artery disease)  Asthma  H/O  section  x 3  Multiple lipomas    FAMILY HISTORY:  No pertinent family history in first degree relatives      Allergies    rubber (Short breath; Swelling)  cephalosporins (Hives; Diarrhea)  latex (Hives)  penicillin (Hives; Diarrhea)    Intolerances        REVIEW OF SYSTEMS  Negative except as noted in HPI    HOME MEDICATIONS:  Home Medications:  acetaminophen 325 mg oral tablet: 2 tab(s) orally every 6 hours As needed Temp greater or equal to 38C (100.4F), Mild Pain (1 - 3) (2024 15:34)  albuterol 2.5 mg/3 mL (0.083%) inhalation solution: 3 milliliter(s) by nebulizer once a day (2024 21:12)  aspirin 81 mg oral tablet: 1 tab(s) orally once a day (2024 21:12)  carvedilol 6.25 mg oral tablet: 1 tab(s) orally 2 times a day (2024 21:12)  clonazePAM 0.5 mg oral tablet: 1 tab(s) orally once a day (2024 21:12)  cyclobenzaprine 10 mg oral tablet: 1 tab(s) orally once a day as needed for (2024 21:12)  Spiriva Respimat 10 ACT 2.5 mcg/inh inhalation aerosol: 2 puff(s) inhaled once a day (2024 21:12)  Valium 5 mg oral tablet: 1 tab(s) orally once a day as needed for (2024 21:12)      MEDICATIONS:  Neuro:  acetaminophen   IVPB .. 1000 milliGRAM(s) IV Intermittent Once  OTHER:  famotidine Injectable 20 milliGRAM(s) IV Push once  IVF:  sodium chloride 0.9% Bolus 1000 milliLiter(s) IV Bolus once    Vital Signs Last 24 Hrs  T(C): 36.7 (28 Aug 2024 20:06), Max: 36.7 (28 Aug 2024 20:06)  T(F): 98.1 (28 Aug 2024 20:06), Max: 98.1 (28 Aug 2024 20:06)  HR: 106 (28 Aug 2024 20:06) (106 - 106)  BP: 132/86 (28 Aug 2024 20:06) (132/86 - 132/86)  BP(mean): --  RR: 18 (28 Aug 2024 20:06) (18 - 18)  SpO2: 98% (28 Aug 2024 20:06) (98% - 98%)    Parameters below as of 28 Aug 2024 20:06  Patient On (Oxygen Delivery Method): room air    PHYSICAL EXAM:  GENERAL: NAD, well-groomed, well-developed  HEAD:  Atraumatic, normocephalic  FLORENCE COMA SCORE: E- V- M- = 15  MENTAL STATUS: AAO x3; Awake, Opens eyes spontaneously, Appropriately conversant without aphasia, following simple commands  CRANIAL NERVES:PERRL. EOMI without nystagmus. Face symmetric w/ normal eye closure and smile, tongue midline. Speech clear  MOTOR: strength 5/5 b/l upper and lower extremities  SENSATION: grossly intact to light touch all extremities  SKIN: Warm, dry; no rashes or lesions    RADIOLOGY & ADDITIONAL STUDIES:  IMPRESSION:  CTA NECK:  No evidence of significant stenosis or occlusion. No evidence of vessel injury or dissection.  CTA HEAD:  Patent intracranial circulation without flow limiting stenosis.  No evidence of aneurysm. Tiny aneurysms can be beyond the resolution of CTA technique.  --- End of Report ---  JULIO OTERO MD; Attending Radiologist  This document has been electronically signed. Aug 28 2024 7:12PM    IMPRESSION:  CT HEAD:  Acute subarachnoid hemorrhage within the right sylvian fissure. Recommend CTA head for further evaluation.  CT CERVICAL SPINE:  No acute fracture or traumatic subluxation. Multi-level degenerative changes.  These findings were discussed with, and acknowledged by, Dr. Thomas on 2024 5:10 PM by Dr. Ki Castaneda.  --- End of Report ---  KI CASTANEDA DO; Resident Radiologist  This document has been electronically signed.  SHAUN SYLVESTER MD; Attending Radiologist  This document has been electronically signed. Aug 28 2024 5:24PM    CAPRINI SCORE [CLOT]:  Patient has an estimated Caprini score of greater than 5.  However, the patient's unique clinical situation will be addressed in an individual manner to determine appropriate anticoagulation treatment, if any. Patient is a 59y old  Female who presents with a chief complaint of multiple episode of passing out    HISTORY OF PRESENT ILLNESS:   58 yo female with a pmhx of CAD on ASA (last reported dose a month ago), asthma, lipomas, HLD, etoh abuse (reports she drinks about a pint of vodka daily since ) who presents as a transfer from [facility] with complaints of leg pain and a headache. Patient states that over the last week she has had multiple episodes of passing out. Patient states that the she hit her head everyday for the last three days, last head strike today.      PAST MEDICAL & SURGICAL HISTORY:  High cholesterol  CA skin, basal cell  Spasmodic dysphonia  CAD (coronary artery disease)  Asthma  H/O  section  x 3  Multiple lipomas    FAMILY HISTORY:  No pertinent family history in first degree relatives      Allergies    rubber (Short breath; Swelling)  cephalosporins (Hives; Diarrhea)  latex (Hives)  penicillin (Hives; Diarrhea)    Intolerances        REVIEW OF SYSTEMS  Negative except as noted in HPI    HOME MEDICATIONS:  Home Medications:  acetaminophen 325 mg oral tablet: 2 tab(s) orally every 6 hours As needed Temp greater or equal to 38C (100.4F), Mild Pain (1 - 3) (2024 15:34)  albuterol 2.5 mg/3 mL (0.083%) inhalation solution: 3 milliliter(s) by nebulizer once a day (2024 21:12)  aspirin 81 mg oral tablet: 1 tab(s) orally once a day (2024 21:12)  carvedilol 6.25 mg oral tablet: 1 tab(s) orally 2 times a day (2024 21:12)  clonazePAM 0.5 mg oral tablet: 1 tab(s) orally once a day (2024 21:12)  cyclobenzaprine 10 mg oral tablet: 1 tab(s) orally once a day as needed for (2024 21:12)  Spiriva Respimat 10 ACT 2.5 mcg/inh inhalation aerosol: 2 puff(s) inhaled once a day (2024 21:12)  Valium 5 mg oral tablet: 1 tab(s) orally once a day as needed for (2024 21:12)      MEDICATIONS:  Neuro:  acetaminophen   IVPB .. 1000 milliGRAM(s) IV Intermittent Once  OTHER:  famotidine Injectable 20 milliGRAM(s) IV Push once  IVF:  sodium chloride 0.9% Bolus 1000 milliLiter(s) IV Bolus once    Vital Signs Last 24 Hrs  T(C): 36.7 (28 Aug 2024 20:06), Max: 36.7 (28 Aug 2024 20:06)  T(F): 98.1 (28 Aug 2024 20:06), Max: 98.1 (28 Aug 2024 20:06)  HR: 106 (28 Aug 2024 20:06) (106 - 106)  BP: 132/86 (28 Aug 2024 20:06) (132/86 - 132/86)  BP(mean): --  RR: 18 (28 Aug 2024 20:06) (18 - 18)  SpO2: 98% (28 Aug 2024 20:06) (98% - 98%)    Parameters below as of 28 Aug 2024 20:06  Patient On (Oxygen Delivery Method): room air    PHYSICAL EXAM:  GENERAL: NAD, well-groomed, well-developed  HEAD:  Atraumatic, normocephalic  FLORENCE COMA SCORE: E- V- M- = 15  MENTAL STATUS: AAO x3; Awake, Opens eyes spontaneously, Appropriately conversant without aphasia, following simple commands  CRANIAL NERVES: PERRL. EOMI without nystagmus. Face symmetric w/ normal eye closure and smile, tongue midline. Speech clear  MOTOR: strength 5/5 b/l upper and lower extremities  SENSATION: grossly intact to light touch all extremities  SKIN: Warm, dry; no rashes or lesions    RADIOLOGY & ADDITIONAL STUDIES:  IMPRESSION:  CTA NECK:  No evidence of significant stenosis or occlusion. No evidence of vessel injury or dissection.  CTA HEAD:  Patent intracranial circulation without flow limiting stenosis.  No evidence of aneurysm. Tiny aneurysms can be beyond the resolution of CTA technique.  --- End of Report ---  JULIO OTERO MD; Attending Radiologist  This document has been electronically signed. Aug 28 2024 7:12PM    IMPRESSION:  CT HEAD:  Acute subarachnoid hemorrhage within the right sylvian fissure. Recommend CTA head for further evaluation.  CT CERVICAL SPINE:  No acute fracture or traumatic subluxation. Multi-level degenerative changes.  These findings were discussed with, and acknowledged by, Dr. Thomas on 2024 5:10 PM by Dr. Ki Castaneda.  --- End of Report ---  KI CASTANEDA DO; Resident Radiologist  This document has been electronically signed.  SHAUN SYLVESTER MD; Attending Radiologist  This document has been electronically signed. Aug 28 2024 5:24PM    CAPRINI SCORE [CLOT]:  Patient has an estimated Caprini score of greater than 5.  However, the patient's unique clinical situation will be addressed in an individual manner to determine appropriate anticoagulation treatment, if any.

## 2024-08-28 NOTE — H&P ADULT - ATTENDING COMMENTS
Seen and examined in ER.   Reports daily large volume vodka drinker, recent multiple falls w/ varying recollection of surrounding events; most recently has fallen 3  times over last 3 days (once per day).   Workup w/ evidence of R SAH; also noted to have right anterior leg small hematoma w/ surrounding ecchymosis   --admit to SICU for q1 neuro checks   --repeat CT head in 6hrs   --[facility] protocol   --SBIRT. PTSD screen  --Hold AC  --MMPR

## 2024-08-28 NOTE — H&P ADULT - NSHPLABSRESULTS_GEN_ALL_CORE
.  LABS:           PT/INR - ( 28 Aug 2024 20:24 )   PT: 10.8 sec;   INR: 0.97 ratio         PTT - ( 28 Aug 2024 20:24 )  PTT:28.5 sec          RADIOLOGY, EKG & ADDITIONAL TESTS: Reviewed.     FINDINGS:    CT BRAIN:    VENTRICLES AND SULCI: Normal in size and configuration.  INTRA-AXIAL: No mass effect, acute hemorrhage, or midline shift.  EXTRA-AXIAL: Acute subarachnoid hemorrhage within the right sylvian fissure. This could be posttraumatic, but aneurysm rupture cannot be categorically excluded. Consider head CTA.    No parenchymal, subdural, ventricular, or epidural hemorrhage. No calvarial fractures identified.    VISUALIZED SINUSES: Clear.  TYMPANOMASTOID CAVITIES: Clear.  VISUALIZED ORBITS: Normal.  CALVARIUM: Intact.    MISCELLANEOUS: None.      CT CERVICAL SPINE:        VERTEBRAE: Normal in height. No acute fracture.  ALIGNMENT: Slight reversal of the normal cervical lordosis. Mild anterolisthesis of C2 on C3 and C3 on C4.  INTERVERTEBRAL DISC SPACES: Narrowing of the disc space at C4-5, with mild soft tissue encroachment on the central canal. Moderate bilateral C4-5 and right C5-6 osteophytic foraminal encroachment.    VISUALIZED LUNGS: Clear.    MISCELLANEOUS: None.      IMPRESSION:    CT HEAD:  Acute subarachnoid hemorrhage within the right sylvian fissure. Recommend CTA head for further evaluation.    CT CERVICAL SPINE:  No acute fracture or traumatic subluxation. Multi-level degenerative changes.

## 2024-08-28 NOTE — H&P ADULT - NSHPPHYSICALEXAM_GEN_ALL_CORE
General: NAD  CV: Normotensive   RS: Nonlabored   Abd: Soft, Minimal tenderness in epigastric region, ND, pelvis stable   CNS: ALert Ox3  Extremities: R Anterior shin ecchymotic, no obvious deformity, hematoma+
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
## 2024-08-28 NOTE — ED PROVIDER NOTE - CLINICAL SUMMARY MEDICAL DECISION MAKING FREE TEXT BOX
59-year-old female patient past medical history of alcohol use presented to [facility] for multiple episodes of syncope over the last 2 days.  Found to have subarachnoid hemorrhage there.  Transferred here for neurosurgery and trauma evaluation. 59-year-old female patient past medical history of alcohol use presented to [facility] for multiple episodes of syncope over the last 2 days.  Found to have subarachnoid hemorrhage there.  Transferred here for neurosurgery and trauma evaluation. Pt admitted to SICU for SAH, possibly traumatic

## 2024-08-28 NOTE — ED PROVIDER NOTE - OBJECTIVE STATEMENT
59-year-old female patient past medical history of alcohol use presented to the [facility] for multiple episodes of syncope over the last 3 days.  Yesterday patient had episode of syncope and hit her head.  Today report reports another episode of syncope but is unsure if she hit her head.  She went to [facility] for right shin pain and ecchymosis.  Patient got CT head there revealing subarachnoid hemorrhage.  CTA negative for aneurysm.  Patient transferred here for neurosurgery and trauma evaluation.  Patient is endorsing a headache and feels like she is having trouble getting words out.

## 2024-08-28 NOTE — ED PROVIDER NOTE - PHYSICAL EXAMINATION
Gen:  NAD  Head: NC/AT  Neck: trachea midline  Card: regular rate and rhythm  Resp:  CTAB  Abd: soft, non-distended, non-tender  Ext: no deformities  Neuro: A&O to person, place, and time. CN 2-12 intact, No tremors. No fasciculations. horizontal nystagmus present. Normal finger to nose and heel to shin b/l. No dysmetria.  Normal gait. Normal sensation. Normal strength. Pt is occasionally slow to respond but speech is clear  Skin:  Warm and dry as visualized  Psych:  Normal affect and mood Gen:  NAD  Head: NC/AT  Neck: trachea midline  Card: regular rate and rhythm  Resp:  CTAB  Abd: soft, non-distended, non-tender  Ext: R shin hematoma w/ overlying abrasion  Neuro: A&O to person, place, and time. CN 2-12 intact, No tremors. No fasciculations. horizontal nystagmus present. Normal finger to nose and heel to shin b/l. No dysmetria.  Normal gait. Normal sensation. Normal strength. Pt is occasionally slow to respond but speech is clear  Skin:  Warm and dry as visualized  Psych:  Normal affect and mood

## 2024-08-28 NOTE — ED ADULT NURSE NOTE - CHIEF COMPLAINT QUOTE
PT BIBA from [facility] for subarachnoid hemorrhage. PT ETOH dependent with multiple falls over past few days. No blood thinners. 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 Received Ofirmev, keppra, thiamine and zofran

## 2024-08-28 NOTE — ED ADULT NURSE NOTE - NSFALLRISKINTERV_ED_ALL_ED
Assistance OOB with selected safe patient handling equipment if applicable/Assistance with ambulation/Communicate fall risk and risk factors to all staff, patient, and family/Monitor gait and stability/Provide visual cue: yellow wristband, yellow gown, etc/Reinforce activity limits and safety measures with patient and family/Call bell, personal items and telephone in reach/Instruct patient to call for assistance before getting out of bed/chair/stretcher/Non-slip footwear applied when patient is off stretcher/[place] to call system/Physically safe environment - no spills, clutter or unnecessary equipment/Purposeful Proactive Rounding/Room/bathroom lighting operational, light cord in reach

## 2024-08-28 NOTE — CONSULT NOTE ADULT - ASSESSMENT
60 yo female presents as a transfer from [facility] who was found to have a non aneurysmal SAH in the right sylvian fissure    Plan:  -Case and plan discussed by attending on call   -Recommend trauma consult    58 yo [place] [facility] [place] found to have a non aneurysmal SAH in the right sylvian fissure    Plan:  -Case and plan discussed with Dr. Sanchez attending on call   -Recommend trauma consult   -Recommend rpt CTH in 6 hours, can be done sooner if clinically changes  -Recommend q1 hour neurochecks, notify NSG STAT for changes  -Keppra 500mg BID x 7 days

## 2024-08-28 NOTE — ED PROVIDER NOTE - ATTENDING CONTRIBUTION TO CARE
59y F w/ hx ETOH abuse, HTN, CAD, asthma; present as transfer from [facility] for traumatic SAH. Pt reports falling multiple times over the past few weeks. Fell again today, hitting her head. Was found to have acute subarachnoid hemorrhage within the right sylvian fissure on CT. No aneurysm on CTA. No other acute traumatic injuries on CT/XR. On exam pt in no acute distress, slightly confused but GCS 15. No focal neuro deficits. +Hematoma right lower leg. Neurosurgery and trauma consulted. Admitted to SICU for q1h neuro checks.

## 2024-08-29 ENCOUNTER — RESULT REVIEW (OUTPATIENT)
Age: 59
End: 2024-08-29

## 2024-08-29 DIAGNOSIS — S06.6X9: ICD-10-CM

## 2024-08-29 LAB
ANION GAP SERPL CALC-SCNC: 11 MMOL/L — SIGNIFICANT CHANGE UP (ref 5–17)
ANION GAP SERPL CALC-SCNC: 12 MMOL/L — SIGNIFICANT CHANGE UP (ref 5–17)
APPEARANCE UR: ABNORMAL
BACTERIA # UR AUTO: ABNORMAL /HPF
BASOPHILS # BLD AUTO: 0.03 K/UL — SIGNIFICANT CHANGE UP (ref 0–0.2)
BASOPHILS NFR BLD AUTO: 0.6 % — SIGNIFICANT CHANGE UP (ref 0–2)
BILIRUB UR-MCNC: NEGATIVE — SIGNIFICANT CHANGE UP
BUN SERPL-MCNC: 4.1 MG/DL — LOW (ref 8–20)
BUN SERPL-MCNC: 8.3 MG/DL — SIGNIFICANT CHANGE UP (ref 8–20)
CALCIUM SERPL-MCNC: 7.5 MG/DL — LOW (ref 8.4–10.5)
CALCIUM SERPL-MCNC: 8.9 MG/DL — SIGNIFICANT CHANGE UP (ref 8.4–10.5)
CAST: 2 /LPF — SIGNIFICANT CHANGE UP (ref 0–4)
CHLORIDE SERPL-SCNC: 103 MMOL/L — SIGNIFICANT CHANGE UP (ref 96–108)
CHLORIDE SERPL-SCNC: 108 MMOL/L — SIGNIFICANT CHANGE UP (ref 96–108)
CHLORIDE UR-SCNC: <27 MMOL/L — SIGNIFICANT CHANGE UP
CO2 SERPL-SCNC: 19 MMOL/L — LOW (ref 22–29)
CO2 SERPL-SCNC: 20 MMOL/L — LOW (ref 22–29)
COLOR SPEC: YELLOW — SIGNIFICANT CHANGE UP
CREAT SERPL-MCNC: 0.61 MG/DL — SIGNIFICANT CHANGE UP (ref 0.5–1.3)
CREAT SERPL-MCNC: 0.68 MG/DL — SIGNIFICANT CHANGE UP (ref 0.5–1.3)
DIFF PNL FLD: ABNORMAL
EGFR: 102 ML/MIN/1.73M2 — SIGNIFICANT CHANGE UP
EGFR: 104 ML/MIN/1.73M2 — SIGNIFICANT CHANGE UP
EOSINOPHIL # BLD AUTO: 0.05 K/UL — SIGNIFICANT CHANGE UP (ref 0–0.5)
EOSINOPHIL NFR BLD AUTO: 1.1 % — SIGNIFICANT CHANGE UP (ref 0–6)
GLUCOSE SERPL-MCNC: 107 MG/DL — HIGH (ref 70–99)
GLUCOSE SERPL-MCNC: 79 MG/DL — SIGNIFICANT CHANGE UP (ref 70–99)
GLUCOSE UR QL: NEGATIVE MG/DL — SIGNIFICANT CHANGE UP
HCT VFR BLD CALC: 26.7 % — LOW (ref 34.5–45)
HGB BLD-MCNC: 9.6 G/DL — LOW (ref 11.5–15.5)
IMM GRANULOCYTES NFR BLD AUTO: 1.5 % — HIGH (ref 0–0.9)
KETONES UR-MCNC: 15 MG/DL
LEUKOCYTE ESTERASE UR-ACNC: NEGATIVE — SIGNIFICANT CHANGE UP
LYMPHOCYTES # BLD AUTO: 0.82 K/UL — LOW (ref 1–3.3)
LYMPHOCYTES # BLD AUTO: 17.4 % — SIGNIFICANT CHANGE UP (ref 13–44)
MAGNESIUM SERPL-MCNC: 1.6 MG/DL — SIGNIFICANT CHANGE UP (ref 1.6–2.6)
MAGNESIUM SERPL-MCNC: 2.1 MG/DL — SIGNIFICANT CHANGE UP (ref 1.6–2.6)
MCHC RBC-ENTMCNC: 35.4 PG — HIGH (ref 27–34)
MCHC RBC-ENTMCNC: 36 GM/DL — SIGNIFICANT CHANGE UP (ref 32–36)
MCV RBC AUTO: 98.5 FL — SIGNIFICANT CHANGE UP (ref 80–100)
MONOCYTES # BLD AUTO: 0.45 K/UL — SIGNIFICANT CHANGE UP (ref 0–0.9)
MONOCYTES NFR BLD AUTO: 9.5 % — SIGNIFICANT CHANGE UP (ref 2–14)
MRSA PCR RESULT.: SIGNIFICANT CHANGE UP
NEUTROPHILS # BLD AUTO: 3.3 K/UL — SIGNIFICANT CHANGE UP (ref 1.8–7.4)
NEUTROPHILS NFR BLD AUTO: 69.9 % — SIGNIFICANT CHANGE UP (ref 43–77)
NITRITE UR-MCNC: NEGATIVE — SIGNIFICANT CHANGE UP
OSMOLALITY SERPL: 284 MOSMOL/KG — SIGNIFICANT CHANGE UP (ref 275–300)
OSMOLALITY UR: 261 MOSM/KG — LOW (ref 300–1000)
PH UR: 6 — SIGNIFICANT CHANGE UP (ref 5–8)
PHOSPHATE SERPL-MCNC: 0.8 MG/DL — CRITICAL LOW (ref 2.4–4.7)
PHOSPHATE SERPL-MCNC: 1.8 MG/DL — LOW (ref 2.4–4.7)
PLATELET # BLD AUTO: 62 K/UL — LOW (ref 150–400)
POTASSIUM SERPL-MCNC: 3.1 MMOL/L — LOW (ref 3.5–5.3)
POTASSIUM SERPL-MCNC: 3.2 MMOL/L — LOW (ref 3.5–5.3)
POTASSIUM SERPL-SCNC: 3.1 MMOL/L — LOW (ref 3.5–5.3)
POTASSIUM SERPL-SCNC: 3.2 MMOL/L — LOW (ref 3.5–5.3)
PROT UR-MCNC: 100 MG/DL
RBC # BLD: 2.71 M/UL — LOW (ref 3.8–5.2)
RBC # FLD: 13.6 % — SIGNIFICANT CHANGE UP (ref 10.3–14.5)
RBC CASTS # UR COMP ASSIST: 1 /HPF — SIGNIFICANT CHANGE UP (ref 0–4)
S AUREUS DNA NOSE QL NAA+PROBE: SIGNIFICANT CHANGE UP
SODIUM SERPL-SCNC: 134 MMOL/L — LOW (ref 135–145)
SODIUM SERPL-SCNC: 138 MMOL/L — SIGNIFICANT CHANGE UP (ref 135–145)
SODIUM UR-SCNC: <30 MMOL/L — SIGNIFICANT CHANGE UP
SP GR SPEC: 1.01 — SIGNIFICANT CHANGE UP (ref 1–1.03)
SQUAMOUS # UR AUTO: 10 /HPF — HIGH (ref 0–5)
TROPONIN T, HIGH SENSITIVITY RESULT: 18 NG/L — SIGNIFICANT CHANGE UP (ref 0–51)
UROBILINOGEN FLD QL: 1 MG/DL — SIGNIFICANT CHANGE UP (ref 0.2–1)
WBC # BLD: 4.72 K/UL — SIGNIFICANT CHANGE UP (ref 3.8–10.5)
WBC # FLD AUTO: 4.72 K/UL — SIGNIFICANT CHANGE UP (ref 3.8–10.5)
WBC UR QL: 2 /HPF — SIGNIFICANT CHANGE UP (ref 0–5)

## 2024-08-29 PROCEDURE — 99232 SBSQ HOSP IP/OBS MODERATE 35: CPT

## 2024-08-29 PROCEDURE — 93010 ELECTROCARDIOGRAM REPORT: CPT

## 2024-08-29 PROCEDURE — 76705 ECHO EXAM OF ABDOMEN: CPT | Mod: 26

## 2024-08-29 PROCEDURE — 70450 CT HEAD/BRAIN W/O DYE: CPT | Mod: 26,77

## 2024-08-29 PROCEDURE — 93010 ELECTROCARDIOGRAM REPORT: CPT | Mod: 77

## 2024-08-29 PROCEDURE — 70450 CT HEAD/BRAIN W/O DYE: CPT | Mod: 26

## 2024-08-29 PROCEDURE — 93306 TTE W/DOPPLER COMPLETE: CPT | Mod: 26

## 2024-08-29 RX ORDER — POTASSIUM CHLORIDE 10 MEQ
10 TABLET, EXT RELEASE, PARTICLES/CRYSTALS ORAL
Refills: 0 | Status: DISCONTINUED | OUTPATIENT
Start: 2024-08-29 | End: 2024-08-29

## 2024-08-29 RX ORDER — ACETAMINOPHEN 325 MG/1
1000 TABLET ORAL EVERY 6 HOURS
Refills: 0 | Status: COMPLETED | OUTPATIENT
Start: 2024-08-29 | End: 2024-08-29

## 2024-08-29 RX ORDER — ACETAMINOPHEN 325 MG/1
725 TABLET ORAL ONCE
Refills: 0 | Status: COMPLETED | OUTPATIENT
Start: 2024-08-29 | End: 2024-08-30

## 2024-08-29 RX ORDER — PANTOPRAZOLE SODIUM 40 MG
40 TABLET, DELAYED RELEASE (ENTERIC COATED) ORAL
Refills: 0 | Status: DISCONTINUED | OUTPATIENT
Start: 2024-08-29 | End: 2024-09-05

## 2024-08-29 RX ORDER — POTASSIUM PHOSPHATE 236; 224 MG/ML; MG/ML
30 INJECTION, SOLUTION INTRAVENOUS ONCE
Refills: 0 | Status: COMPLETED | OUTPATIENT
Start: 2024-08-29 | End: 2024-08-29

## 2024-08-29 RX ORDER — MAGNESIUM, ALUMINUM HYDROXIDE 200-225/5
30 SUSPENSION, ORAL (FINAL DOSE FORM) ORAL EVERY 6 HOURS
Refills: 0 | Status: DISCONTINUED | OUTPATIENT
Start: 2024-08-29 | End: 2024-09-05

## 2024-08-29 RX ORDER — TAMSULOSIN HYDROCHLORIDE 0.4 MG/1
0.4 CAPSULE ORAL AT BEDTIME
Refills: 0 | Status: DISCONTINUED | OUTPATIENT
Start: 2024-08-29 | End: 2024-09-03

## 2024-08-29 RX ORDER — OXYCODONE HYDROCHLORIDE 5 MG/1
2.5 TABLET ORAL EVERY 4 HOURS
Refills: 0 | Status: DISCONTINUED | OUTPATIENT
Start: 2024-08-29 | End: 2024-08-29

## 2024-08-29 RX ORDER — POTASSIUM CHLORIDE 10 MEQ
40 TABLET, EXT RELEASE, PARTICLES/CRYSTALS ORAL EVERY 4 HOURS
Refills: 0 | Status: DISCONTINUED | OUTPATIENT
Start: 2024-08-29 | End: 2024-08-29

## 2024-08-29 RX ORDER — POTASSIUM CHLORIDE 10 MEQ
10 TABLET, EXT RELEASE, PARTICLES/CRYSTALS ORAL
Refills: 0 | Status: COMPLETED | OUTPATIENT
Start: 2024-08-29 | End: 2024-08-29

## 2024-08-29 RX ORDER — OXYCODONE HYDROCHLORIDE 5 MG/1
5 TABLET ORAL EVERY 4 HOURS
Refills: 0 | Status: DISCONTINUED | OUTPATIENT
Start: 2024-08-29 | End: 2024-08-29

## 2024-08-29 RX ORDER — SODIUM PHOSPHATE, DIBASIC, ANHYDROUS, POTASSIUM PHOSPHATE, MONOBASIC, AND SODIUM PHOSPHATE, MONOBASIC, MONOHYDRATE 852; 155; 130 MG/1; MG/1; MG/1
2 TABLET, COATED ORAL EVERY 6 HOURS
Refills: 0 | Status: COMPLETED | OUTPATIENT
Start: 2024-08-29 | End: 2024-08-30

## 2024-08-29 RX ADMIN — ACETAMINOPHEN 400 MILLIGRAM(S): 325 TABLET ORAL at 13:51

## 2024-08-29 RX ADMIN — ACETAMINOPHEN 400 MILLIGRAM(S): 325 TABLET ORAL at 18:27

## 2024-08-29 RX ADMIN — Medication 105 MILLIGRAM(S): at 16:30

## 2024-08-29 RX ADMIN — OXYCODONE HYDROCHLORIDE 5 MILLIGRAM(S): 5 TABLET ORAL at 05:45

## 2024-08-29 RX ADMIN — ACETAMINOPHEN 1000 MILLIGRAM(S): 325 TABLET ORAL at 01:50

## 2024-08-29 RX ADMIN — TIOTROPIUM BROMIDE INHALATION SPRAY 2 PUFF(S): 3.12 SPRAY, METERED RESPIRATORY (INHALATION) at 08:34

## 2024-08-29 RX ADMIN — TAMSULOSIN HYDROCHLORIDE 0.4 MILLIGRAM(S): 0.4 CAPSULE ORAL at 21:53

## 2024-08-29 RX ADMIN — CHLORHEXIDINE GLUCONATE 1 APPLICATION(S): 40 SOLUTION TOPICAL at 14:15

## 2024-08-29 RX ADMIN — Medication 40 MILLIGRAM(S): at 16:29

## 2024-08-29 RX ADMIN — Medication 25 GRAM(S): at 04:36

## 2024-08-29 RX ADMIN — Medication 100 MILLIEQUIVALENT(S): at 07:30

## 2024-08-29 RX ADMIN — Medication 105 MILLIGRAM(S): at 03:26

## 2024-08-29 RX ADMIN — ACETAMINOPHEN 400 MILLIGRAM(S): 325 TABLET ORAL at 01:37

## 2024-08-29 RX ADMIN — SODIUM PHOSPHATE, DIBASIC, ANHYDROUS, POTASSIUM PHOSPHATE, MONOBASIC, AND SODIUM PHOSPHATE, MONOBASIC, MONOHYDRATE 2 TABLET(S): 852; 155; 130 TABLET, COATED ORAL at 21:53

## 2024-08-29 RX ADMIN — OXYCODONE HYDROCHLORIDE 5 MILLIGRAM(S): 5 TABLET ORAL at 04:57

## 2024-08-29 RX ADMIN — POTASSIUM PHOSPHATE 83.33 MILLIMOLE(S): 236; 224 INJECTION, SOLUTION INTRAVENOUS at 16:30

## 2024-08-29 RX ADMIN — ACETAMINOPHEN 400 MILLIGRAM(S): 325 TABLET ORAL at 06:19

## 2024-08-29 RX ADMIN — Medication 100 MILLIEQUIVALENT(S): at 05:30

## 2024-08-29 RX ADMIN — SODIUM CHLORIDE 100 MILLILITER(S): 9 INJECTION INTRAMUSCULAR; INTRAVENOUS; SUBCUTANEOUS at 06:19

## 2024-08-29 RX ADMIN — LEVETIRACETAM 500 MILLIGRAM(S): 1000 TABLET ORAL at 18:20

## 2024-08-29 RX ADMIN — Medication 1 MILLIGRAM(S): at 03:22

## 2024-08-29 RX ADMIN — SODIUM CHLORIDE 100 MILLILITER(S): 9 INJECTION INTRAMUSCULAR; INTRAVENOUS; SUBCUTANEOUS at 04:36

## 2024-08-29 RX ADMIN — SODIUM PHOSPHATE, DIBASIC, ANHYDROUS, POTASSIUM PHOSPHATE, MONOBASIC, AND SODIUM PHOSPHATE, MONOBASIC, MONOHYDRATE 2 TABLET(S): 852; 155; 130 TABLET, COATED ORAL at 16:29

## 2024-08-29 RX ADMIN — LEVETIRACETAM 500 MILLIGRAM(S): 1000 TABLET ORAL at 06:18

## 2024-08-29 RX ADMIN — Medication 300 MILLIGRAM(S): at 21:53

## 2024-08-29 RX ADMIN — Medication 300 MILLIGRAM(S): at 13:51

## 2024-08-29 RX ADMIN — Medication 300 MILLIGRAM(S): at 04:57

## 2024-08-29 RX ADMIN — Medication 30 MILLILITER(S): at 16:29

## 2024-08-29 RX ADMIN — Medication 100 MILLIEQUIVALENT(S): at 04:37

## 2024-08-29 RX ADMIN — Medication 1 TABLET(S): at 13:52

## 2024-08-29 RX ADMIN — POTASSIUM PHOSPHATE 83.33 MILLIMOLE(S): 236; 224 INJECTION, SOLUTION INTRAVENOUS at 06:18
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
## 2024-08-29 NOTE — PATIENT PROFILE ADULT - FALL HARM RISK - RISK INTERVENTIONS
Assistance OOB with selected safe patient handling equipment/Assistance with ambulation/Communicate Fall Risk and Risk Factors to all staff, patient, and family/Discuss with provider need for PT consult/Monitor for mental status changes/Monitor gait and stability/Reinforce activity limits and safety measures with patient and family/Toileting schedule using arm’s reach rule for commode and bathroom/Use of alarms - bed, chair and/or voice tab/Visual Cue: Yellow wristband/Bed in lowest position, wheels locked, appropriate side rails in place/Call bell, personal items and telephone in reach/Instruct patient to call for assistance before getting out of bed or chair/Non-slip footwear when patient is out of bed/[place] to call system/Physically safe environment - no spills, clutter or unnecessary equipment/Purposeful Proactive Rounding/Room/bathroom lighting operational, light cord in reach

## 2024-08-29 NOTE — DISCHARGE NOTE PROVIDER - NSDCCPCAREPLAN_GEN_ALL_CORE_FT
PRINCIPAL DISCHARGE DIAGNOSIS  Diagnosis: Subarachnoid hemorrhage  Assessment and Plan of Treatment:       SECONDARY DISCHARGE DIAGNOSES  Diagnosis: Hepatic cyst  Assessment and Plan of Treatment: Sometimes during the process of diagnosis and treatment for one disorder, a second problem or abnormality is found. We call this an incidental finding. An incidental finding is not related to why you came into the hospital, but is something that should be followed up on after you leave the hospital.  During your care, the following incidental finding was identified and discussed with you or your surrogate: 1.3cm Hepatic cyst  Seen on: 8/29  This finding should be followed up by: your primary care provider     PRINCIPAL DISCHARGE DIAGNOSIS  Diagnosis: Subarachnoid hemorrhage  Assessment and Plan of Treatment: Follow up: Please call and make an appointment with Neurosurgeon in 4-6 weeks, the [facility] 10-14 days after discharge. Also, please call and make an appointment with your primary care physician as per your usual schedule.   Diet: May continue regular diet.  Medications: You are encouraged to take over-the-counter tylenol and/or ibuprofen for pain relief.  Patient is advised to RETURN TO THE EMERGENCY DEPARTMENT for any of the following - worsening pain, fever/chills, nausea/vomiting, altered mental status, chest pain, shortness of breath, or any other new / worsening symptom.        SECONDARY DISCHARGE DIAGNOSES  Diagnosis: Hepatic cyst  Assessment and Plan of Treatment: Sometimes during the process of diagnosis and treatment for one disorder, a second problem or abnormality is found. We call this an incidental finding. An incidental finding is not related to why you came into the hospital, but is something that should be followed up on after you leave the hospital.  During your care, the following incidental finding was identified and discussed with you or your surrogate: 1.3cm Hepatic cyst  Seen on: 8/29  This finding should be followed up by: your primary care provider

## 2024-08-29 NOTE — DISCHARGE NOTE PROVIDER - NSFOLLOWUPCLINICS_GEN_ALL_ED_FT
[facility] Acute Care Surgery  Acute Care Surgery  [address] [place] [id number]  Phone: [phone]  Fax:   Follow Up Time: 2 weeks

## 2024-08-29 NOTE — DISCHARGE NOTE PROVIDER - CARE PROVIDER_API CALL
Patricia Welsh  Psychiatry  [address] [place] [id number]  Phone: [phone]  Fax: [phone]  Follow Up Time: 2 weeks   Patricia Welsh  Psychiatry  [address] [place] [address]  Phone: [phone]  Fax: [phone]  Follow Up Time: 2 weeks    Corry Sanchez  Neurosurgery  [address] [place] [id number]  Phone: [phone]  Fax: [phone]  Follow Up Time: 1 month    PMD,   Phone: (   )    -  Fax: (   )    -  Follow Up Time: 2 weeks

## 2024-08-29 NOTE — DISCHARGE NOTE PROVIDER - DETAILS OF MALNUTRITION DIAGNOSIS/DIAGNOSES
This patient has been assessed with a concern for Malnutrition and was treated during this hospitalization for the following Nutrition diagnosis/diagnoses:     -  09/04/2024: Moderate protein-calorie malnutrition

## 2024-08-29 NOTE — CHART NOTE - NSCHARTNOTEFT_GEN_A_CORE
Tertiary Trauma Survey (TTS)    Date of TTS: 08-29-24 @ 16:04                             Admit Date: 08-28-24 @ 22:36      Trauma Activation:    List Injuries Identified to Date:   Right sylvian fissure SAH    List Operative and Interventional Radiological Procedures:       - PTSD    - SBIRT [x]  Date: 8/29/2024    - Geriatric consult: N/A    - GOC      Physical Exam:    Neuro: Motor strength 5/5 bilaterally in upper and lower extremities.  strength intact bilaterally in upper extremities.     HEENT: No ecchymosis or tenderness to head/neck. EOM intact.     Pulm/Chest: Tenderness along sternum. No ecchymosis. Normal work of breathing.    Cardiac: No chest pain or SOB.     GI / Abdomen: No tenderness to palpation in all 4 quadrants. No lesions, ecchymosis, or pulsatile masses.     Musculoskeletal / Extremities: Left shoulder tender to palpation. Ecchymosis on right arm, right posterolateral thigh, and right anterior shin. Pain upon dorsiflexion of right foot. No clubbing or edema.     Integumentary: No cyanosis or lesions.       RADIOLOGICAL FINDINGS REVIEW:   CXR: mild degenerative changes of the mid to lower thoracic spine and mild lower thoracic levoscoliosis, unchanged.   Right Tib/fib XR: Pretibial soft tissue swelling. No subcutaneous air or radiopaque foreign body.   RUQ US: Hepatic steatosis, stable. Liver is mildly increased in echogenicity consistent with mild hepatic steatosis. No evidence of cholelithiasis or cholecystitis. No biliary ductal   dilatation identified.  CT Head: Stable subarachnoid hemorrhage identified at the posterior right sylvian fissure. There are stable periventricular and subcortical white matter hypodensities, consistent with microvascular type changes.  XR right knee: degenerative narrowing of the medial and patellofemoral compartments. calcification at the distal quadriceps tendonous insertion near the upper pole of the patella. there is subtle area of sclerosis and lucency within the proximal tibial metaphysis, suggestive of enchondroma.   XR ankle: within normal limits.   CT head 8/28 4:57pm: acute subarachnoid hemorrhage within the right sylvian fissure. multiple level degenerative changes in the cervical spine.   CT cervical spine 8/28: acute subarachnoid hemorrhage within the right sylvian fissure. multiple level degenerative changes in the cervical spine.   CTA head: patent intracranial circulation without flow-limiting stenosis.   CTA neck: patent intracranial circulation without flow-limiting stenosis. no evidence of significant stenosis or occlusion. no evidence of vessel injury or dissection.   CT head 8/29 12:27am: stable appearance of subarachnoid hemorrhage as compared to prior exam approximately 7 hours ago.   XR pelvis  CT head    INCIDENTAL FINDINGS:    [ ] No    [X] Yes, Findings are:  CT chest: stable 0.5 cm nodule in the left upper lobe 2:31 that is unchanged from 6/1/2024 and has decreased in size from 5/29/2017. right upper lobe platelike atelectasis. Trace calcifications of the ostium of the left subclavian artery. There is a 1.3 cm hepatic cyst. There is a vague region of hypodensity adjacent to the proximal ligament that may represent an area of focally greater fatty infiltration. Degenerative changes and mild scoliosis.         [X] Tertiary exam complete, there are no new injuries identified    [ ] Tertiary exam done, new injuries identified are:                [ ] Imaging ordered: Tertiary Trauma Survey (TTS)    Date of TTS: 08-29-24 @ 16:04                             Admit Date: 08-28-24 @ 22:36      Trauma Activation:    List Injuries Identified to Date:   Right sylvian fissure SAH    List Operative and Interventional Radiological Procedures:       - PTSD    - SBIRT [x]  Date: 8/29/2024    - Geriatric consult: N/A    - GOC      Physical Exam:    Neuro: Motor strength 5/5 bilaterally in upper and lower extremities.  strength intact bilaterally in upper extremities. No gross neurological deficits    HEENT: No ecchymosis or tenderness to head/neck. EOM intact. No facial trauma, no lacerations to the scalp.    Pulm/Chest: Tenderness along sternum. No ecchymosis or crepitus. Normal work of breathing.    Cardiac: No chest pain or SOB.     GI / Abdomen: No tenderness to palpation in all 4 quadrants. No lesions, ecchymosis, or pulsatile masses.     Musculoskeletal / Extremities: Left shoulder tender to palpation. Ecchymosis on right arm, right posterolateral thigh, and right anterior shin. Pain upon dorsiflexion of right foot. No clubbing or edema.     Integumentary: No cyanosis or lesions.       RADIOLOGICAL FINDINGS REVIEW:   CXR: mild degenerative changes of the mid to lower thoracic spine and mild lower thoracic levoscoliosis, unchanged.   Right Tib/fib XR: Pretibial soft tissue swelling. No subcutaneous air or radiopaque foreign body.   RUQ US: Hepatic steatosis, stable. Liver is mildly increased in echogenicity consistent with mild hepatic steatosis. No evidence of cholelithiasis or cholecystitis. No biliary ductal   dilatation identified.  CT Head: Stable subarachnoid hemorrhage identified at the posterior right sylvian fissure. There are stable periventricular and subcortical white matter hypodensities, consistent with microvascular type changes.  XR right knee: degenerative narrowing of the medial and patellofemoral compartments. calcification at the distal quadriceps tendonous insertion near the upper pole of the patella. there is subtle area of sclerosis and lucency within the proximal tibial metaphysis, suggestive of enchondroma.   XR ankle: within normal limits.   CT head 8/28 4:57pm: acute subarachnoid hemorrhage within the right sylvian fissure. multiple level degenerative changes in the cervical spine.   CT cervical spine 8/28: acute subarachnoid hemorrhage within the right sylvian fissure. multiple level degenerative changes in the cervical spine.   CT Chest: No acute fx. Stable 0.5 cm nodule in the left upper lobe (2:31) that is unchanged from 6/1/2024 and decreased in size from 5/29/2017  CTA head: patent intracranial circulation without flow-limiting stenosis.   CTA neck: patent intracranial circulation without flow-limiting stenosis. no evidence of significant stenosis or occlusion. no evidence of vessel injury or dissection.   CT head 8/29 12:27am: stable appearance of subarachnoid hemorrhage as compared to prior exam approximately 7 hours ago.   XR pelvis  CT head    INCIDENTAL FINDINGS:    [ ] No    [X] Yes, Findings are:  CT chest: stable 0.5 cm nodule in the left upper lobe 2:31 that is unchanged from 6/1/2024 and has decreased in size from 5/29/2017. right upper lobe platelike atelectasis. Trace calcifications of the ostium of the left subclavian artery. There is a 1.3 cm hepatic cyst. There is a vague region of hypodensity adjacent to the proximal ligament that may represent an area of focally greater fatty infiltration. Degenerative changes and mild scoliosis.         [X] Tertiary exam complete, there are no new injuries identified    [ ] Tertiary exam done, new injuries identified are:                [ ] Imaging ordered: Tertiary Trauma Survey (TTS)    Date of TTS: 08-29-24 @ 16:04                             Admit Date: 08-28-24 @ 22:36      Trauma Activation:    List Injuries Identified to Date:   Right sylvian fissure SAH    List Operative and Interventional Radiological Procedures:       - PTSD    - SBIRT [x]  Date: 8/29/2024    - Geriatric consult: N/A    - GOC      Physical Exam:    Neuro: Motor strength 5/5 bilaterally in upper and lower extremities.  strength intact bilaterally in upper extremities. No gross neurological deficits    HEENT: No ecchymosis or tenderness to head/neck. EOM intact. No facial trauma, no lacerations to the scalp.    Pulm/Chest: Tenderness along sternum. No ecchymosis or crepitus. Normal work of breathing.    Cardiac: No chest pain or SOB.     GI / Abdomen: No tenderness to palpation in all 4 quadrants. No lesions, ecchymosis, or pulsatile masses.     Musculoskeletal / Extremities: Left shoulder tender to palpation but with full pain free ROM. Ecchymosis on right arm, right hip, and right anterior shin. 5/5 pain free strength when pulling and pushing against the examiner. Pain upon dorsiflexion of right foot. No clubbing or edema.     Integumentary: No cyanosis or lesions.       RADIOLOGICAL FINDINGS REVIEW:   CXR: mild degenerative changes of the mid to lower thoracic spine and mild lower thoracic levoscoliosis, unchanged.   Right Tib/fib XR: Pretibial soft tissue swelling. No subcutaneous air or radiopaque foreign body.   RUQ US: Hepatic steatosis, stable. Liver is mildly increased in echogenicity consistent with mild hepatic steatosis. No evidence of cholelithiasis or cholecystitis. No biliary ductal   dilatation identified.  CT Head: Stable subarachnoid hemorrhage identified at the posterior right sylvian fissure. There are stable periventricular and subcortical white matter hypodensities, consistent with microvascular type changes.  XR right knee: degenerative narrowing of the medial and patellofemoral compartments. calcification at the distal quadriceps tendonous insertion near the upper pole of the patella. there is subtle area of sclerosis and lucency within the proximal tibial metaphysis, suggestive of enchondroma.   XR ankle: within normal limits.   CT head 8/28 4:57pm: acute subarachnoid hemorrhage within the right sylvian fissure. multiple level degenerative changes in the cervical spine.   CT cervical spine 8/28: acute subarachnoid hemorrhage within the right sylvian fissure. multiple level degenerative changes in the cervical spine.   CT Chest: No acute fx. Stable 0.5 cm nodule in the left upper lobe (2:31) that is unchanged from 6/1/2024 and decreased in size from 5/29/2017  CTA head: patent intracranial circulation without flow-limiting stenosis.   CTA neck: patent intracranial circulation without flow-limiting stenosis. no evidence of significant stenosis or occlusion. no evidence of vessel injury or dissection.   CT head 8/29 12:27am: stable appearance of subarachnoid hemorrhage as compared to prior exam approximately 7 hours ago.   XR pelvis***  CT head***    INCIDENTAL FINDINGS:    [ ] No    [X] Yes, Findings are:  CT chest: stable 0.5 cm nodule in the left upper lobe 2:31 that is unchanged from 6/1/2024 and has decreased in size from 5/29/2017. There is a 1.3 cm hepatic cyst.        [X] Tertiary exam complete, there are no new injuries identified    [ ] Tertiary exam done, new injuries identified are:                [ ] Imaging ordered: Tertiary Trauma Survey (TTS)    Date of TTS: 08-29-24 @ 16:04                             Admit Date: 08-28-24 @ 22:36      Trauma Activation:    List Injuries Identified to Date:   Right sylvian fissure SAH    List Operative and Interventional Radiological Procedures:       - PTSD    - SBIRT [x]  Date: 8/29/2024    - Geriatric consult: N/A    - GOC      Physical Exam:    Neuro: Motor strength 5/5 bilaterally in upper and lower extremities.  strength intact bilaterally in upper extremities. No gross neurological deficits    HEENT: No ecchymosis or tenderness to head/neck. EOM intact. No facial trauma, no lacerations to the scalp.    Pulm/Chest: Tenderness along sternum. No ecchymosis or crepitus. Normal work of breathing.    Cardiac: No chest pain or SOB.     GI / Abdomen: No tenderness to palpation in all 4 quadrants. No lesions, ecchymosis, or pulsatile masses.     Musculoskeletal / Extremities: Left shoulder tender to palpation but with full pain free ROM. Ecchymosis on right arm, right hip, and right anterior shin. 5/5 pain free strength when pulling and pushing against the examiner. Pain upon dorsiflexion of right foot. No clubbing or edema.     Integumentary: No cyanosis or lesions.       RADIOLOGICAL FINDINGS REVIEW:   CXR: mild degenerative changes of the mid to lower thoracic spine and mild lower thoracic levoscoliosis, unchanged.   Right Tib/fib XR: Pretibial soft tissue swelling. No subcutaneous air or radiopaque foreign body.   RUQ US: Hepatic steatosis, stable. Liver is mildly increased in echogenicity consistent with mild hepatic steatosis. No evidence of cholelithiasis or cholecystitis. No biliary ductal   dilatation identified.  CT Head: Stable subarachnoid hemorrhage identified at the posterior right sylvian fissure. There are stable periventricular and subcortical white matter hypodensities, consistent with microvascular type changes.  XR right knee: degenerative narrowing of the medial and patellofemoral compartments. calcification at the distal quadriceps tendonous insertion near the upper pole of the patella. there is subtle area of sclerosis and lucency within the proximal tibial metaphysis, suggestive of enchondroma.   XR ankle: within normal limits.   CT head 8/28 4:57pm: acute subarachnoid hemorrhage within the right sylvian fissure. multiple level degenerative changes in the cervical spine.   CT cervical spine 8/28: acute subarachnoid hemorrhage within the right sylvian fissure. multiple level degenerative changes in the cervical spine.   CT Chest: No acute fx. Stable 0.5 cm nodule in the left upper lobe (2:31) that is unchanged from 6/1/2024 and decreased in size from 5/29/2017  CTA head: patent intracranial circulation without flow-limiting stenosis.   CTA neck: patent intracranial circulation without flow-limiting stenosis. no evidence of significant stenosis or occlusion. no evidence of vessel injury or dissection.   CT head 8/29 12:27am: stable appearance of subarachnoid hemorrhage as compared to prior exam approximately 7 hours ago.   XR pelvis***  CT head***    INCIDENTAL FINDINGS:    [ ] No    [X] Yes, Findings are:  CT chest: stable 0.5 cm nodule in the left upper lobe 2:31 that is unchanged from 6/1/2024 and has decreased in size from 5/29/2017 - Pt aware of findings since she had covid and follows with a pulmonologist.  There is a 1.3 cm hepatic cyst. - Pt made aware 8/29        [X] Tertiary exam complete, there are no new injuries identified    [ ] Tertiary exam done, new injuries identified are:                [ ] Imaging ordered:      RESIDENT ADDENDUM  I was present for the entirety of the medical student's examination and have incorporated my additional comments to the above note.  I independently reviewed the patient's images and agree with the above. Tertiary Trauma Survey (TTS)    Date of TTS: 08-29-24 @ 16:04                             Admit Date: 08-28-24 @ 22:36      Trauma Activation:    List Injuries Identified to Date:   Right sylvian fissure SAH    List Operative and Interventional Radiological Procedures:       - PTSD    - SBIRT [x]  Date: 8/29/2024    - Geriatric consult: N/A    - GOC      Physical Exam:    Neuro: Motor strength 5/5 bilaterally in upper and lower extremities.  strength intact bilaterally in upper extremities. No gross neurological deficits    HEENT: No ecchymosis or tenderness to head/neck. EOM intact. No facial trauma, no lacerations to the scalp.    Pulm/Chest: Tenderness along sternum. No ecchymosis or crepitus. Normal work of breathing.    Cardiac: No chest pain or SOB.     GI / Abdomen: No tenderness to palpation in all 4 quadrants. No lesions, ecchymosis, or pulsatile masses.     Musculoskeletal / Extremities: Left shoulder tender to palpation but with full pain free ROM. Ecchymosis on right arm, right hip, and right anterior shin. 5/5 pain free strength when pulling and pushing against the examiner. Pain upon dorsiflexion of right foot. No clubbing or edema.     Integumentary: No cyanosis or lesions.       RADIOLOGICAL FINDINGS REVIEW:   CXR: mild degenerative changes of the mid to lower thoracic spine and mild lower thoracic levoscoliosis, unchanged.   Right Tib/fib XR: Pretibial soft tissue swelling. No subcutaneous air or radiopaque foreign body.   RUQ US: Hepatic steatosis, stable. Liver is mildly increased in echogenicity consistent with mild hepatic steatosis. No evidence of cholelithiasis or cholecystitis. No biliary ductal   dilatation identified.  CT Head: Stable subarachnoid hemorrhage identified at the posterior right sylvian fissure. There are stable periventricular and subcortical white matter hypodensities, consistent with microvascular type changes.  XR right knee: degenerative narrowing of the medial and patellofemoral compartments. calcification at the distal quadriceps tendonous insertion near the upper pole of the patella. there is subtle area of sclerosis and lucency within the proximal tibial metaphysis, suggestive of enchondroma.   XR ankle: within normal limits.   CT head 8/28 4:57pm: acute subarachnoid hemorrhage within the right sylvian fissure. multiple level degenerative changes in the cervical spine.   CT cervical spine 8/28: acute subarachnoid hemorrhage within the right sylvian fissure. multiple level degenerative changes in the cervical spine.   CT Chest: No acute fx. Stable 0.5 cm nodule in the left upper lobe (2:31) that is unchanged from 6/1/2024 and decreased in size from 5/29/2017  CTA head: patent intracranial circulation without flow-limiting stenosis.   CTA neck: patent intracranial circulation without flow-limiting stenosis. no evidence of significant stenosis or occlusion. no evidence of vessel injury or dissection.   CT head 8/29 12:27am: stable appearance of subarachnoid hemorrhage as compared to prior exam approximately 7 hours ago.   XR pelvis***  CT head: stable exam    INCIDENTAL FINDINGS:    [ ] No    [X] Yes, Findings are:  CT chest: stable 0.5 cm nodule in the left upper lobe 2:31 that is unchanged from 6/1/2024 and has decreased in size from 5/29/2017 - Pt aware of findings and follows with a pulmonologist.  There is a 1.3 cm hepatic cyst. - Pt made aware 8/29        [X] Tertiary exam complete, there are no new injuries identified    [ ] Tertiary exam done, new injuries identified are:                [ ] Imaging ordered:      RESIDENT ADDENDUM  I was present for the entirety of the medical student's examination and have incorporated my additional comments to the above note.  I independently reviewed the patient's images and agree with the above. Tertiary Trauma Survey (TTS)    Date of TTS: 08-29-24 @ 16:04                             Admit Date: 08-28-24 @ 22:36      Trauma Activation:    List Injuries Identified to Date:   Right sylvian fissure SAH    List Operative and Interventional Radiological Procedures:       - PTSD    - SBIRT [x]  Date: 8/29/2024    - Geriatric consult: N/A    - GOC      Physical Exam:    Neuro: Motor strength 5/5 bilaterally in upper and lower extremities.  strength intact bilaterally in upper extremities. No gross neurological deficits    HEENT: No ecchymosis or tenderness to head/neck. EOM intact. No facial trauma, no lacerations to the scalp.    Pulm/Chest: Tenderness along sternum. No ecchymosis or crepitus. Normal work of breathing.    Cardiac: No chest pain or SOB.     GI / Abdomen: No tenderness to palpation in all 4 quadrants. No lesions, ecchymosis, or pulsatile masses.     Musculoskeletal / Extremities: Left shoulder tender to palpation but with full pain free ROM. Ecchymosis on right arm, right hip, and right anterior shin. 5/5 pain free strength when pulling and pushing against the examiner. Pain upon dorsiflexion of right foot. No clubbing or edema.     Integumentary: No cyanosis or lesions.       RADIOLOGICAL FINDINGS REVIEW:   CXR: mild degenerative changes of the mid to lower thoracic spine and mild lower thoracic levoscoliosis, unchanged.   Right Tib/fib XR: Pretibial soft tissue swelling. No subcutaneous air or radiopaque foreign body.   RUQ US: Hepatic steatosis, stable. Liver is mildly increased in echogenicity consistent with mild hepatic steatosis. No evidence of cholelithiasis or cholecystitis. No biliary ductal   dilatation identified.  CT Head: Stable subarachnoid hemorrhage identified at the posterior right sylvian fissure. There are stable periventricular and subcortical white matter hypodensities, consistent with microvascular type changes.  XR right knee: degenerative narrowing of the medial and patellofemoral compartments. calcification at the distal quadriceps tendonous insertion near the upper pole of the patella. there is subtle area of sclerosis and lucency within the proximal tibial metaphysis, suggestive of enchondroma.   XR ankle: within normal limits.   CT head 8/28 4:57pm: acute subarachnoid hemorrhage within the right sylvian fissure. multiple level degenerative changes in the cervical spine.   CT cervical spine 8/28: acute subarachnoid hemorrhage within the right sylvian fissure. multiple level degenerative changes in the cervical spine.   CT Chest: No acute fx. Stable 0.5 cm nodule in the left upper lobe (2:31) that is unchanged from 6/1/2024 and decreased in size from 5/29/2017  CTA head: patent intracranial circulation without flow-limiting stenosis.   CTA neck: patent intracranial circulation without flow-limiting stenosis. no evidence of significant stenosis or occlusion. no evidence of vessel injury or dissection.   CT head 8/29 12:27am: stable appearance of subarachnoid hemorrhage as compared to prior exam approximately 7 hours ago.   XR pelvis***  CT head: stable exam    INCIDENTAL FINDINGS:    [ ] No    [X] Yes, Findings are:  CT chest: stable 0.5 cm nodule in the left upper lobe 2:31 that is unchanged from 6/1/2024 and has decreased in size from 5/29/2017 - Pt aware of findings and follows with a pulmonologist.  There is a 1.3 cm hepatic cyst. - Pt made aware 8/29        [ ] Tertiary exam complete, there are no new injuries identified ***PENDING PELVIS XR***    [ ] Tertiary exam done, new injuries identified are:                [ ] Imaging ordered:      RESIDENT ADDENDUM  I was present for the entirety of the medical student's examination and have incorporated my additional comments to the above note.  I independently reviewed the patient's images and agree with the above. Tertiary Trauma Survey (TTS)    Date of TTS: 08-29-24 @ 16:04                             Admit Date: 08-28-24 @ 22:36      Trauma Activation:    List Injuries Identified to Date:   Right sylvian fissure SAH    List Operative and Interventional Radiological Procedures:       - PTSD    - SBIRT [x]  Date: 8/29/2024    - Geriatric consult: N/A    - GOC      Physical Exam:    Neuro: Motor strength 5/5 bilaterally in upper and lower extremities.  strength intact bilaterally in upper extremities. No gross neurological deficits    HEENT: No ecchymosis or tenderness to head/neck. EOM intact. No facial trauma, no lacerations to the scalp.    Pulm/Chest: Tenderness along sternum. No ecchymosis or crepitus. Normal work of breathing.    Cardiac: No chest pain or SOB.     GI / Abdomen: No tenderness to palpation in all 4 quadrants. No lesions, ecchymosis, or pulsatile masses.     Musculoskeletal / Extremities: Left shoulder tender to palpation but with full pain free ROM. Ecchymosis on right arm, right hip, and right anterior shin. 5/5 pain free strength when pulling and pushing against the examiner. Pain upon dorsiflexion of right foot. No clubbing or edema.     Integumentary: No cyanosis or lesions.       RADIOLOGICAL FINDINGS REVIEW:   CXR: mild degenerative changes of the mid to lower thoracic spine and mild lower thoracic levoscoliosis, unchanged.   Right Tib/fib XR: Pretibial soft tissue swelling. No subcutaneous air or radiopaque foreign body.   RUQ US: Hepatic steatosis, stable. Liver is mildly increased in echogenicity consistent with mild hepatic steatosis. No evidence of cholelithiasis or cholecystitis. No biliary ductal   dilatation identified.  CT Head: Stable subarachnoid hemorrhage identified at the posterior right sylvian fissure. There are stable periventricular and subcortical white matter hypodensities, consistent with microvascular type changes.  XR right knee: degenerative narrowing of the medial and patellofemoral compartments. calcification at the distal quadriceps tendonous insertion near the upper pole of the patella. there is subtle area of sclerosis and lucency within the proximal tibial metaphysis, suggestive of enchondroma.   XR ankle: within normal limits.   CT head 8/28 4:57pm: acute subarachnoid hemorrhage within the right sylvian fissure. multiple level degenerative changes in the cervical spine.   CT cervical spine 8/28: acute subarachnoid hemorrhage within the right sylvian fissure. multiple level degenerative changes in the cervical spine.   CT Chest: No acute fx. Stable 0.5 cm nodule in the left upper lobe (2:31) that is unchanged from 6/1/2024 and decreased in size from 5/29/2017  CTA head: patent intracranial circulation without flow-limiting stenosis.   CTA neck: patent intracranial circulation without flow-limiting stenosis. no evidence of significant stenosis or occlusion. no evidence of vessel injury or dissection.   CT head 8/29 12:27am: stable appearance of subarachnoid hemorrhage as compared to prior exam approximately 7 hours ago.   XR pelvis: Markedly distended urinary bladder.  CT head: stable exam    INCIDENTAL FINDINGS:    [ ] No    [X] Yes, Findings are:  CT chest: stable 0.5 cm nodule in the left upper lobe 2:31 that is unchanged from 6/1/2024 and has decreased in size from 5/29/2017 - Pt aware of findings and follows with a pulmonologist.  There is a 1.3 cm hepatic cyst. - Pt made aware 8/29        [ ] Tertiary exam complete, there are no new injuries identified ***PENDING PELVIS XR***    [ ] Tertiary exam done, new injuries identified are:                [ ] Imaging ordered:      RESIDENT ADDENDUM  I was present for the entirety of the medical student's examination and have incorporated my additional comments to the above note.  I independently reviewed the patient's images and agree with the above.

## 2024-08-29 NOTE — DISCHARGE NOTE PROVIDER - NSDCMRMEDTOKEN_GEN_ALL_CORE_FT
acetaminophen 325 mg oral tablet: 2 tab(s) orally every 6 hours As needed Temp greater or equal to 38C (100.4F), Mild Pain (1 - 3)  albuterol 2.5 mg/3 mL (0.083%) inhalation solution: 3 milliliter(s) by nebulizer once a day  aspirin 81 mg oral tablet: 1 tab(s) orally once a day  atorvastatin 80 mg oral tablet: 1 tab(s) orally once a day (at bedtime)  carvedilol 6.25 mg oral tablet: 1 tab(s) orally 2 times a day  clonazePAM 0.5 mg oral tablet: 1 tab(s) orally once a day  cyclobenzaprine 10 mg oral tablet: 1 tab(s) orally once a day as needed for  DilTIAZem (Eqv-Cardizem CD) 120 mg/24 hours oral capsule, extended release: 1 cap(s) orally once a day  gabapentin 300 mg oral capsule: 1 cap(s) orally every 8 hours  Spiriva Respimat 10 ACT 2.5 mcg/inh inhalation aerosol: 2 puff(s) inhaled once a day  Valium 5 mg oral tablet: 1 tab(s) orally once a day as needed for   acetaminophen 325 mg oral tablet: 2 tab(s) orally every 6 hours As needed Temp greater or equal to 38C (100.4F), Mild Pain (1 - 3)  albuterol 2.5 mg/3 mL (0.083%) inhalation solution: 3 milliliter(s) by nebulizer once a day  aspirin 81 mg oral tablet: 1 tab(s) orally once a day  atorvastatin 80 mg oral tablet: 1 tab(s) orally once a day (at bedtime)  carvedilol 6.25 mg oral tablet: 1 tab(s) orally 2 times a day  cholecalciferol oral tablet: 2000 unit(s) orally once a day  clonazePAM 0.5 mg oral tablet: 1 tab(s) orally once a day  cyanocobalamin 1000 mcg oral tablet: 1 tab(s) orally once a day  cyclobenzaprine 10 mg oral tablet: 1 tab(s) orally once a day as needed for  DilTIAZem (Eqv-Cardizem CD) 120 mg/24 hours oral capsule, extended release: 1 cap(s) orally once a day  folic acid 1 mg oral tablet: 1 tab(s) orally once a day  gabapentin 300 mg oral capsule: 1 cap(s) orally every 8 hours  Multiple Vitamins with Minerals oral tablet: 1 tab(s) orally once a day  sertraline 25 mg oral tablet: 1 tab(s) orally once a day  simethicone 80 mg oral tablet, chewable: 1 tab(s) orally every 6 hours As needed Gas  Spiriva Respimat 10 ACT 2.5 mcg/inh inhalation aerosol: 2 puff(s) inhaled once a day  thiamine 100 mg oral tablet: 1 tab(s) orally once a day  Valium 5 mg oral tablet: 1 tab(s) orally once a day as needed for

## 2024-08-29 NOTE — DISCHARGE NOTE PROVIDER - PROVIDER TOKENS
PROVIDER:[TOKEN:[[id number]],FOLLOWUP:[2 weeks]] PROVIDER:[TOKEN:[[id number]],FOLLOWUP:[2 weeks]],PROVIDER:[TOKEN:[[id number]],FOLLOWUP:[1 month]],FREE:[LAST:[PMD],PHONE:[(   )    -],FAX:[(   )    -],FOLLOWUP:[2 weeks]]
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
## 2024-08-29 NOTE — PROGRESS NOTE ADULT - ATTENDING COMMENTS
Patient seen and examined on SICU AM rounds on 8/29/24.  Agree with house staff note  CT-head x2 stable

## 2024-08-30 LAB
ALT FLD-CCNC: 44 U/L — HIGH
ANION GAP SERPL CALC-SCNC: 14 MMOL/L — SIGNIFICANT CHANGE UP (ref 5–17)
AST SERPL-CCNC: 110 U/L — HIGH
BASOPHILS # BLD AUTO: 0.04 K/UL — SIGNIFICANT CHANGE UP (ref 0–0.2)
BASOPHILS NFR BLD AUTO: 1 % — SIGNIFICANT CHANGE UP (ref 0–2)
BUN SERPL-MCNC: 3.4 MG/DL — LOW (ref 8–20)
CALCIUM SERPL-MCNC: 8 MG/DL — LOW (ref 8.4–10.5)
CHLORIDE SERPL-SCNC: 106 MMOL/L — SIGNIFICANT CHANGE UP (ref 96–108)
CO2 SERPL-SCNC: 21 MMOL/L — LOW (ref 22–29)
CREAT SERPL-MCNC: 0.51 MG/DL — SIGNIFICANT CHANGE UP (ref 0.5–1.3)
EGFR: 109 ML/MIN/1.73M2 — SIGNIFICANT CHANGE UP
EOSINOPHIL # BLD AUTO: 0.08 K/UL — SIGNIFICANT CHANGE UP (ref 0–0.5)
EOSINOPHIL NFR BLD AUTO: 1.9 % — SIGNIFICANT CHANGE UP (ref 0–6)
GLUCOSE SERPL-MCNC: 87 MG/DL — SIGNIFICANT CHANGE UP (ref 70–99)
HCT VFR BLD CALC: 27.2 % — LOW (ref 34.5–45)
HGB BLD-MCNC: 9.6 G/DL — LOW (ref 11.5–15.5)
IMM GRANULOCYTES NFR BLD AUTO: 1.7 % — HIGH (ref 0–0.9)
LIDOCAIN IGE QN: 108 U/L — HIGH (ref 22–51)
LYMPHOCYTES # BLD AUTO: 0.84 K/UL — LOW (ref 1–3.3)
LYMPHOCYTES # BLD AUTO: 20.3 % — SIGNIFICANT CHANGE UP (ref 13–44)
MAGNESIUM SERPL-MCNC: 1.8 MG/DL — SIGNIFICANT CHANGE UP (ref 1.6–2.6)
MCHC RBC-ENTMCNC: 35.3 GM/DL — SIGNIFICANT CHANGE UP (ref 32–36)
MCHC RBC-ENTMCNC: 35.6 PG — HIGH (ref 27–34)
MCV RBC AUTO: 100.7 FL — HIGH (ref 80–100)
MONOCYTES # BLD AUTO: 0.22 K/UL — SIGNIFICANT CHANGE UP (ref 0–0.9)
MONOCYTES NFR BLD AUTO: 5.3 % — SIGNIFICANT CHANGE UP (ref 2–14)
MRSA PCR RESULT.: SIGNIFICANT CHANGE UP
NEUTROPHILS # BLD AUTO: 2.88 K/UL — SIGNIFICANT CHANGE UP (ref 1.8–7.4)
NEUTROPHILS NFR BLD AUTO: 69.8 % — SIGNIFICANT CHANGE UP (ref 43–77)
PHOSPHATE SERPL-MCNC: 3.2 MG/DL — SIGNIFICANT CHANGE UP (ref 2.4–4.7)
PLATELET # BLD AUTO: 87 K/UL — LOW (ref 150–400)
POTASSIUM SERPL-MCNC: 3.1 MMOL/L — LOW (ref 3.5–5.3)
POTASSIUM SERPL-SCNC: 3.1 MMOL/L — LOW (ref 3.5–5.3)
RBC # BLD: 2.7 M/UL — LOW (ref 3.8–5.2)
RBC # FLD: 13.8 % — SIGNIFICANT CHANGE UP (ref 10.3–14.5)
S AUREUS DNA NOSE QL NAA+PROBE: SIGNIFICANT CHANGE UP
SODIUM SERPL-SCNC: 141 MMOL/L — SIGNIFICANT CHANGE UP (ref 135–145)
WBC # BLD: 4.13 K/UL — SIGNIFICANT CHANGE UP (ref 3.8–10.5)
WBC # FLD AUTO: 4.13 K/UL — SIGNIFICANT CHANGE UP (ref 3.8–10.5)

## 2024-08-30 PROCEDURE — 99232 SBSQ HOSP IP/OBS MODERATE 35: CPT

## 2024-08-30 RX ORDER — ENOXAPARIN SODIUM 100 MG/ML
30 INJECTION SUBCUTANEOUS EVERY 12 HOURS
Refills: 0 | Status: DISCONTINUED | OUTPATIENT
Start: 2024-08-30 | End: 2024-09-05

## 2024-08-30 RX ADMIN — LEVETIRACETAM 500 MILLIGRAM(S): 1000 TABLET ORAL at 17:20

## 2024-08-30 RX ADMIN — Medication 30 MILLILITER(S): at 22:13

## 2024-08-30 RX ADMIN — LEVETIRACETAM 500 MILLIGRAM(S): 1000 TABLET ORAL at 04:43

## 2024-08-30 RX ADMIN — ACETAMINOPHEN 725 MILLIGRAM(S): 325 TABLET ORAL at 01:38

## 2024-08-30 RX ADMIN — Medication 105 MILLIGRAM(S): at 12:13

## 2024-08-30 RX ADMIN — Medication 300 MILLIGRAM(S): at 14:16

## 2024-08-30 RX ADMIN — ACETAMINOPHEN 290 MILLIGRAM(S): 325 TABLET ORAL at 00:56

## 2024-08-30 RX ADMIN — ENOXAPARIN SODIUM 30 MILLIGRAM(S): 100 INJECTION SUBCUTANEOUS at 17:20

## 2024-08-30 RX ADMIN — TIOTROPIUM BROMIDE INHALATION SPRAY 2 PUFF(S): 3.12 SPRAY, METERED RESPIRATORY (INHALATION) at 08:37

## 2024-08-30 RX ADMIN — TAMSULOSIN HYDROCHLORIDE 0.4 MILLIGRAM(S): 0.4 CAPSULE ORAL at 22:10

## 2024-08-30 RX ADMIN — Medication 40 MILLIGRAM(S): at 04:43

## 2024-08-30 RX ADMIN — Medication 1 TABLET(S): at 12:13

## 2024-08-30 RX ADMIN — Medication 1000 MILLILITER(S): at 03:54

## 2024-08-30 RX ADMIN — SODIUM PHOSPHATE, DIBASIC, ANHYDROUS, POTASSIUM PHOSPHATE, MONOBASIC, AND SODIUM PHOSPHATE, MONOBASIC, MONOHYDRATE 2 TABLET(S): 852; 155; 130 TABLET, COATED ORAL at 01:06

## 2024-08-30 RX ADMIN — Medication 40 MILLILITER(S): at 03:54

## 2024-08-30 RX ADMIN — SODIUM PHOSPHATE, DIBASIC, ANHYDROUS, POTASSIUM PHOSPHATE, MONOBASIC, AND SODIUM PHOSPHATE, MONOBASIC, MONOHYDRATE 2 TABLET(S): 852; 155; 130 TABLET, COATED ORAL at 12:13

## 2024-08-30 RX ADMIN — SODIUM PHOSPHATE, DIBASIC, ANHYDROUS, POTASSIUM PHOSPHATE, MONOBASIC, AND SODIUM PHOSPHATE, MONOBASIC, MONOHYDRATE 2 TABLET(S): 852; 155; 130 TABLET, COATED ORAL at 04:43

## 2024-08-30 RX ADMIN — Medication 30 MILLILITER(S): at 03:54

## 2024-08-30 RX ADMIN — Medication 300 MILLIGRAM(S): at 22:10

## 2024-08-30 RX ADMIN — Medication 300 MILLIGRAM(S): at 04:43

## 2024-08-30 NOTE — PHYSICAL THERAPY INITIAL EVALUATION ADULT - MANUAL MUSCLE TESTING RESULTS, REHAB EVAL
LLE: ankle dorsiflexors 4+/5, knee extensors 4/5, hip flexors at least 3/5.  RLE: ankle dorsiflexors 3+/5, knee extensors at least 3/5, hip flexors at least 3/5.  MMT of RLE limited due to pain.
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
## 2024-08-30 NOTE — PHYSICAL THERAPY INITIAL EVALUATION ADULT - CRITERIA FOR SKILLED THERAPEUTIC INTERVENTIONS
Acute Rehab/impairments found/functional limitations in following categories/anticipated discharge recommendation

## 2024-08-30 NOTE — PHYSICAL THERAPY INITIAL EVALUATION ADULT - GENERAL OBSERVATIONS, REHAB EVAL
Pt received reclining in bed, bedrails x 4, CBWR, Tele, , and BP cuff intact. Pt agreeable to PT evaluation.

## 2024-08-30 NOTE — CHART NOTE - NSCHARTNOTEFT_GEN_A_CORE
SICU TRANSFER NOTE  -----------------------------  ICU Admission Date: 8/28/24  Transfer Date: 08-30-24 @ 11:14    Admission Diagnosis:   1. Right Sylvian Fissure Subarachnoid hemorrhage  2. Hyponatremia  3. Thrombocytopenia  4. Elevated Lipase      Consultants:    [x]Neurosurgery      Medications  aluminum hydroxide/magnesium hydroxide/simethicone Suspension 30 milliLiter(s) Oral every 6 hours PRN  enoxaparin Injectable 30 milliGRAM(s) SubCutaneous every 12 hours  gabapentin 300 milliGRAM(s) Oral every 8 hours  levETIRAcetam   Injectable 500 milliGRAM(s) IV Push every 12 hours  multivitamin 1 Tablet(s) Oral daily  pantoprazole    Tablet 40 milliGRAM(s) Oral before breakfast  potassium phosphate / sodium phosphate Tablet (K-PHOS No. 2) 2 Tablet(s) Oral every 6 hours  tamsulosin 0.4 milliGRAM(s) Oral at bedtime  thiamine IVPB 500 milliGRAM(s) IV Intermittent daily  tiotropium 2.5 MICROgram(s) Inhaler 2 Puff(s) Inhalation daily      [x] I attest I have reviewed and reconciled all medications prior to transfer    IV Fluids  sodium chloride 0.9%.: Solution, 1000 milliLiter(s) infuse at 100 mL/Hr  Provider's Contact #: [phone]  sodium chloride 0.9% Bolus:   1000 milliLiter(s), IV Bolus, once, infuse over 1 Hour(s), Stop After 1 Doses  Provider's Contact #: [phone]  sodium chloride 0.9% Bolus:   1000 milliLiter(s), IV Bolus, once, infuse over 60 Minute(s), Stop After 1 Doses        I have discussed this case with the trauma team upon transfer and all questions regarding ICU course were answered.  The following items are to be followed up:    1. PT/OT  2. Q 4 neuro checks  3. PTSD / SBIRT FU  4. Out patient follow up for elevated lipase and thrombocytopenia.
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
## 2024-08-30 NOTE — PHYSICAL THERAPY INITIAL EVALUATION ADULT - DIAGNOSIS, PT EVAL
Pt demonstrates functional limitations in transfers and ambulation due to decreased balance, coordination and strength.
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
## 2024-08-30 NOTE — PHYSICAL THERAPY INITIAL EVALUATION ADULT - GAIT DEVIATIONS NOTED, PT EVAL
pt demonstrates Loss of balance posterolaterally x 3 episodes, requiring Mod A to recover balance each time./decreased mendoza/increased time in double stance/decreased step length/decreased weight-shifting ability

## 2024-08-30 NOTE — PHYSICAL THERAPY INITIAL EVALUATION ADULT - GROSSLY INTACT, SENSORY
to Bilateral feet, however pt reports she has not been able to sense when she urinates or has bowel movement x 1 week./Grossly Intact
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
## 2024-08-31 LAB
ALBUMIN SERPL ELPH-MCNC: 3.3 G/DL — SIGNIFICANT CHANGE UP (ref 3.3–5.2)
ALP SERPL-CCNC: 91 U/L — SIGNIFICANT CHANGE UP (ref 40–120)
ALT FLD-CCNC: 36 U/L — HIGH
ANION GAP SERPL CALC-SCNC: 11 MMOL/L — SIGNIFICANT CHANGE UP (ref 5–17)
ANION GAP SERPL CALC-SCNC: 11 MMOL/L — SIGNIFICANT CHANGE UP (ref 5–17)
AST SERPL-CCNC: 60 U/L — HIGH
BASOPHILS # BLD AUTO: 0.15 K/UL — SIGNIFICANT CHANGE UP (ref 0–0.2)
BASOPHILS NFR BLD AUTO: 3.5 % — HIGH (ref 0–2)
BILIRUB DIRECT SERPL-MCNC: 0.1 MG/DL — SIGNIFICANT CHANGE UP (ref 0–0.3)
BILIRUB INDIRECT FLD-MCNC: 0.3 MG/DL — SIGNIFICANT CHANGE UP (ref 0.2–1)
BILIRUB SERPL-MCNC: 0.4 MG/DL — SIGNIFICANT CHANGE UP (ref 0.4–2)
BUN SERPL-MCNC: <3 MG/DL — LOW (ref 8–20)
BUN SERPL-MCNC: <3 MG/DL — LOW (ref 8–20)
CALCIUM SERPL-MCNC: 8.8 MG/DL — SIGNIFICANT CHANGE UP (ref 8.4–10.5)
CALCIUM SERPL-MCNC: 9.3 MG/DL — SIGNIFICANT CHANGE UP (ref 8.4–10.5)
CHLORIDE SERPL-SCNC: 102 MMOL/L — SIGNIFICANT CHANGE UP (ref 96–108)
CHLORIDE SERPL-SCNC: 104 MMOL/L — SIGNIFICANT CHANGE UP (ref 96–108)
CO2 SERPL-SCNC: 27 MMOL/L — SIGNIFICANT CHANGE UP (ref 22–29)
CO2 SERPL-SCNC: 27 MMOL/L — SIGNIFICANT CHANGE UP (ref 22–29)
CREAT SERPL-MCNC: 0.53 MG/DL — SIGNIFICANT CHANGE UP (ref 0.5–1.3)
CREAT SERPL-MCNC: 0.53 MG/DL — SIGNIFICANT CHANGE UP (ref 0.5–1.3)
EGFR: 108 ML/MIN/1.73M2 — SIGNIFICANT CHANGE UP
EGFR: 108 ML/MIN/1.73M2 — SIGNIFICANT CHANGE UP
EOSINOPHIL # BLD AUTO: 0.07 K/UL — SIGNIFICANT CHANGE UP (ref 0–0.5)
EOSINOPHIL NFR BLD AUTO: 1.7 % — SIGNIFICANT CHANGE UP (ref 0–6)
GIANT PLATELETS BLD QL SMEAR: PRESENT — SIGNIFICANT CHANGE UP
GLUCOSE SERPL-MCNC: 103 MG/DL — HIGH (ref 70–99)
GLUCOSE SERPL-MCNC: 111 MG/DL — HIGH (ref 70–99)
HCT VFR BLD CALC: 26.2 % — LOW (ref 34.5–45)
HGB BLD-MCNC: 9.2 G/DL — LOW (ref 11.5–15.5)
LYMPHOCYTES # BLD AUTO: 1.3 K/UL — SIGNIFICANT CHANGE UP (ref 1–3.3)
LYMPHOCYTES # BLD AUTO: 29.6 % — SIGNIFICANT CHANGE UP (ref 13–44)
MAGNESIUM SERPL-MCNC: 1.9 MG/DL — SIGNIFICANT CHANGE UP (ref 1.6–2.6)
MAGNESIUM SERPL-MCNC: 2.4 MG/DL — SIGNIFICANT CHANGE UP (ref 1.6–2.6)
MANUAL SMEAR VERIFICATION: SIGNIFICANT CHANGE UP
MCHC RBC-ENTMCNC: 35 PG — HIGH (ref 27–34)
MCHC RBC-ENTMCNC: 35.1 GM/DL — SIGNIFICANT CHANGE UP (ref 32–36)
MCV RBC AUTO: 99.6 FL — SIGNIFICANT CHANGE UP (ref 80–100)
MONOCYTES # BLD AUTO: 0.54 K/UL — SIGNIFICANT CHANGE UP (ref 0–0.9)
MONOCYTES NFR BLD AUTO: 12.2 % — SIGNIFICANT CHANGE UP (ref 2–14)
NEUTROPHILS # BLD AUTO: 2.26 K/UL — SIGNIFICANT CHANGE UP (ref 1.8–7.4)
NEUTROPHILS NFR BLD AUTO: 51.3 % — SIGNIFICANT CHANGE UP (ref 43–77)
PHOSPHATE SERPL-MCNC: 1.9 MG/DL — LOW (ref 2.4–4.7)
PHOSPHATE SERPL-MCNC: 2.6 MG/DL — SIGNIFICANT CHANGE UP (ref 2.4–4.7)
PLAT MORPH BLD: NORMAL — SIGNIFICANT CHANGE UP
PLATELET # BLD AUTO: 85 K/UL — LOW (ref 150–400)
POLYCHROMASIA BLD QL SMEAR: SLIGHT — SIGNIFICANT CHANGE UP
POTASSIUM SERPL-MCNC: 2.8 MMOL/L — CRITICAL LOW (ref 3.5–5.3)
POTASSIUM SERPL-MCNC: 3.9 MMOL/L — SIGNIFICANT CHANGE UP (ref 3.5–5.3)
POTASSIUM SERPL-SCNC: 2.8 MMOL/L — CRITICAL LOW (ref 3.5–5.3)
POTASSIUM SERPL-SCNC: 3.9 MMOL/L — SIGNIFICANT CHANGE UP (ref 3.5–5.3)
PROT SERPL-MCNC: 5.8 G/DL — LOW (ref 6.6–8.7)
RBC # BLD: 2.63 M/UL — LOW (ref 3.8–5.2)
RBC # FLD: 13.9 % — SIGNIFICANT CHANGE UP (ref 10.3–14.5)
RBC BLD AUTO: ABNORMAL
SODIUM SERPL-SCNC: 140 MMOL/L — SIGNIFICANT CHANGE UP (ref 135–145)
SODIUM SERPL-SCNC: 141 MMOL/L — SIGNIFICANT CHANGE UP (ref 135–145)
TRIGL SERPL-MCNC: 109 MG/DL — SIGNIFICANT CHANGE UP
VARIANT LYMPHS # BLD: 1.7 % — SIGNIFICANT CHANGE UP (ref 0–6)
WBC # BLD: 4.4 K/UL — SIGNIFICANT CHANGE UP (ref 3.8–10.5)
WBC # FLD AUTO: 4.4 K/UL — SIGNIFICANT CHANGE UP (ref 3.8–10.5)

## 2024-08-31 PROCEDURE — 99232 SBSQ HOSP IP/OBS MODERATE 35: CPT

## 2024-08-31 RX ORDER — ACETAMINOPHEN 325 MG/1
650 TABLET ORAL ONCE
Refills: 0 | Status: COMPLETED | OUTPATIENT
Start: 2024-08-31 | End: 2024-08-31

## 2024-08-31 RX ORDER — SODIUM PHOSPHATE, MONOBASIC, MONOHYDRATE AND SODIUM PHOSPHATE, DIBASIC ANHYDROUS 276; 142 MG/ML; MG/ML
30 INJECTION, SOLUTION INTRAVENOUS ONCE
Refills: 0 | Status: COMPLETED | OUTPATIENT
Start: 2024-08-31 | End: 2024-08-31

## 2024-08-31 RX ORDER — SODIUM PHOSPHATE, DIBASIC, ANHYDROUS, POTASSIUM PHOSPHATE, MONOBASIC, AND SODIUM PHOSPHATE, MONOBASIC, MONOHYDRATE 852; 155; 130 MG/1; MG/1; MG/1
1 TABLET, COATED ORAL ONCE
Refills: 0 | Status: COMPLETED | OUTPATIENT
Start: 2024-08-31 | End: 2024-08-31

## 2024-08-31 RX ORDER — PSYLLIUM HUSK 3.4 G/6.5G
1 GRANULE ORAL DAILY
Refills: 0 | Status: DISCONTINUED | OUTPATIENT
Start: 2024-08-31 | End: 2024-09-05

## 2024-08-31 RX ORDER — POTASSIUM CHLORIDE 10 MEQ
40 TABLET, EXT RELEASE, PARTICLES/CRYSTALS ORAL EVERY 4 HOURS
Refills: 0 | Status: COMPLETED | OUTPATIENT
Start: 2024-08-31 | End: 2024-08-31

## 2024-08-31 RX ORDER — POTASSIUM CHLORIDE 10 MEQ
10 TABLET, EXT RELEASE, PARTICLES/CRYSTALS ORAL
Refills: 0 | Status: COMPLETED | OUTPATIENT
Start: 2024-08-31 | End: 2024-08-31

## 2024-08-31 RX ORDER — OXYCODONE HYDROCHLORIDE 5 MG/1
5 TABLET ORAL EVERY 4 HOURS
Refills: 0 | Status: DISCONTINUED | OUTPATIENT
Start: 2024-08-31 | End: 2024-09-02

## 2024-08-31 RX ORDER — OXYCODONE HYDROCHLORIDE 5 MG/1
2.5 TABLET ORAL EVERY 4 HOURS
Refills: 0 | Status: DISCONTINUED | OUTPATIENT
Start: 2024-08-31 | End: 2024-09-02

## 2024-08-31 RX ORDER — FOLIC ACID 1 MG
1 TABLET ORAL DAILY
Refills: 0 | Status: DISCONTINUED | OUTPATIENT
Start: 2024-08-31 | End: 2024-09-03

## 2024-08-31 RX ADMIN — Medication 1 MILLIGRAM(S): at 11:31

## 2024-08-31 RX ADMIN — PSYLLIUM HUSK 1 PACKET(S): 3.4 GRANULE ORAL at 11:30

## 2024-08-31 RX ADMIN — Medication 1 TABLET(S): at 11:30

## 2024-08-31 RX ADMIN — SODIUM PHOSPHATE, DIBASIC, ANHYDROUS, POTASSIUM PHOSPHATE, MONOBASIC, AND SODIUM PHOSPHATE, MONOBASIC, MONOHYDRATE 1 PACKET(S): 852; 155; 130 TABLET, COATED ORAL at 05:54

## 2024-08-31 RX ADMIN — OXYCODONE HYDROCHLORIDE 5 MILLIGRAM(S): 5 TABLET ORAL at 21:26

## 2024-08-31 RX ADMIN — Medication 105 MILLIGRAM(S): at 11:51

## 2024-08-31 RX ADMIN — Medication 40 MILLIGRAM(S): at 06:17

## 2024-08-31 RX ADMIN — OXYCODONE HYDROCHLORIDE 5 MILLIGRAM(S): 5 TABLET ORAL at 22:20

## 2024-08-31 RX ADMIN — ACETAMINOPHEN 650 MILLIGRAM(S): 325 TABLET ORAL at 11:51

## 2024-08-31 RX ADMIN — Medication 100 MILLIEQUIVALENT(S): at 10:58

## 2024-08-31 RX ADMIN — LEVETIRACETAM 500 MILLIGRAM(S): 1000 TABLET ORAL at 05:51

## 2024-08-31 RX ADMIN — Medication 100 MILLIEQUIVALENT(S): at 12:28

## 2024-08-31 RX ADMIN — LEVETIRACETAM 500 MILLIGRAM(S): 1000 TABLET ORAL at 18:19

## 2024-08-31 RX ADMIN — OXYCODONE HYDROCHLORIDE 5 MILLIGRAM(S): 5 TABLET ORAL at 14:35

## 2024-08-31 RX ADMIN — Medication 300 MILLIGRAM(S): at 05:51

## 2024-08-31 RX ADMIN — OXYCODONE HYDROCHLORIDE 5 MILLIGRAM(S): 5 TABLET ORAL at 13:37

## 2024-08-31 RX ADMIN — Medication 40 MILLIEQUIVALENT(S): at 13:51

## 2024-08-31 RX ADMIN — TIOTROPIUM BROMIDE INHALATION SPRAY 2 PUFF(S): 3.12 SPRAY, METERED RESPIRATORY (INHALATION) at 08:42

## 2024-08-31 RX ADMIN — ACETAMINOPHEN 650 MILLIGRAM(S): 325 TABLET ORAL at 12:50

## 2024-08-31 RX ADMIN — Medication 100 MILLIEQUIVALENT(S): at 13:59

## 2024-08-31 RX ADMIN — OXYCODONE HYDROCHLORIDE 5 MILLIGRAM(S): 5 TABLET ORAL at 19:20

## 2024-08-31 RX ADMIN — OXYCODONE HYDROCHLORIDE 5 MILLIGRAM(S): 5 TABLET ORAL at 18:28

## 2024-08-31 RX ADMIN — Medication 100 MILLIEQUIVALENT(S): at 09:31

## 2024-08-31 RX ADMIN — ENOXAPARIN SODIUM 30 MILLIGRAM(S): 100 INJECTION SUBCUTANEOUS at 18:28

## 2024-08-31 RX ADMIN — Medication 40 MILLIEQUIVALENT(S): at 05:51

## 2024-08-31 RX ADMIN — TAMSULOSIN HYDROCHLORIDE 0.4 MILLIGRAM(S): 0.4 CAPSULE ORAL at 21:23

## 2024-08-31 RX ADMIN — ENOXAPARIN SODIUM 30 MILLIGRAM(S): 100 INJECTION SUBCUTANEOUS at 05:51

## 2024-08-31 RX ADMIN — Medication 25 GRAM(S): at 05:55

## 2024-08-31 RX ADMIN — Medication 100 MILLIEQUIVALENT(S): at 05:52

## 2024-08-31 RX ADMIN — Medication 100 MILLIEQUIVALENT(S): at 07:57

## 2024-08-31 RX ADMIN — SODIUM PHOSPHATE, MONOBASIC, MONOHYDRATE AND SODIUM PHOSPHATE, DIBASIC ANHYDROUS 85 MILLIMOLE(S): 276; 142 INJECTION, SOLUTION INTRAVENOUS at 13:58

## 2024-08-31 NOTE — CHART NOTE - NSCHARTNOTEFT_GEN_A_CORE
59 year old female HD#4 s/p ground level fall with SAH  Patient seen and examined on SICU rounds    Awake, alert, oriented x 4  Hemodynamically stable on rounds, but SBP in 80's early this AM & last night  Intermittent tachycardia  Oxygenating well  Tolerating diet, c/o multiple episodes of diarrhea last 36 hours  WBC=WNL, afebrile  Hematocrit=26, lovenox started for DVT prophylaxis  BMP=WNL, no barriga    - I discussed elevated LFTs with patient.  She has previously been told that she has HU & knows that her liver enzymes have been elevated.   Discussed need for hepatology follow up as outpatient. LFTs trending down today.  - Intermittent episodes of asymptomatic hypotension.  May be from hypovolemia secondary to diarrhea.  Will send c.dif has another episode.  Will observe closely in ICU & fluid hydration as needed.  - Severe hypokalemia, likely secondary to diarrhea.   Will replace and repeat labs.

## 2024-08-31 NOTE — PROVIDER CONTACT NOTE (CRITICAL VALUE NOTIFICATION) - TEST AND RESULT REPORTED:
Healing well.  Will instruct pt to continue washing daily with soap and water, watch for s/s of infection.  Plan remote f/u in 3 months  
Potassium 2.8
phosphorus 0.8

## 2024-09-01 LAB
-  AMOXICILLIN/CLAVULANIC ACID: SIGNIFICANT CHANGE UP
-  AMPICILLIN/SULBACTAM: SIGNIFICANT CHANGE UP
-  AMPICILLIN: SIGNIFICANT CHANGE UP
-  AZTREONAM: SIGNIFICANT CHANGE UP
-  CEFAZOLIN: SIGNIFICANT CHANGE UP
-  CEFEPIME: SIGNIFICANT CHANGE UP
-  CEFOXITIN: SIGNIFICANT CHANGE UP
-  CEFTRIAXONE: SIGNIFICANT CHANGE UP
-  CEFUROXIME: SIGNIFICANT CHANGE UP
-  CIPROFLOXACIN: SIGNIFICANT CHANGE UP
-  ERTAPENEM: SIGNIFICANT CHANGE UP
-  GENTAMICIN: SIGNIFICANT CHANGE UP
-  IMIPENEM: SIGNIFICANT CHANGE UP
-  LEVOFLOXACIN: SIGNIFICANT CHANGE UP
-  MEROPENEM: SIGNIFICANT CHANGE UP
-  NITROFURANTOIN: SIGNIFICANT CHANGE UP
-  PIPERACILLIN/TAZOBACTAM: SIGNIFICANT CHANGE UP
-  TOBRAMYCIN: SIGNIFICANT CHANGE UP
-  TRIMETHOPRIM/SULFAMETHOXAZOLE: SIGNIFICANT CHANGE UP
ANION GAP SERPL CALC-SCNC: 11 MMOL/L — SIGNIFICANT CHANGE UP (ref 5–17)
ANISOCYTOSIS BLD QL: SLIGHT — SIGNIFICANT CHANGE UP
BASOPHILS # BLD AUTO: 0.05 K/UL — SIGNIFICANT CHANGE UP (ref 0–0.2)
BASOPHILS NFR BLD AUTO: 0.9 % — SIGNIFICANT CHANGE UP (ref 0–2)
BUN SERPL-MCNC: 4.6 MG/DL — LOW (ref 8–20)
CALCIUM SERPL-MCNC: 8.9 MG/DL — SIGNIFICANT CHANGE UP (ref 8.4–10.5)
CHLORIDE SERPL-SCNC: 97 MMOL/L — SIGNIFICANT CHANGE UP (ref 96–108)
CO2 SERPL-SCNC: 28 MMOL/L — SIGNIFICANT CHANGE UP (ref 22–29)
CREAT SERPL-MCNC: 0.52 MG/DL — SIGNIFICANT CHANGE UP (ref 0.5–1.3)
CULTURE RESULTS: ABNORMAL
EGFR: 108 ML/MIN/1.73M2 — SIGNIFICANT CHANGE UP
EOSINOPHIL # BLD AUTO: 0 K/UL — SIGNIFICANT CHANGE UP (ref 0–0.5)
EOSINOPHIL NFR BLD AUTO: 0 % — SIGNIFICANT CHANGE UP (ref 0–6)
GIANT PLATELETS BLD QL SMEAR: PRESENT — SIGNIFICANT CHANGE UP
GLUCOSE SERPL-MCNC: 90 MG/DL — SIGNIFICANT CHANGE UP (ref 70–99)
HCT VFR BLD CALC: 26.9 % — LOW (ref 34.5–45)
HGB BLD-MCNC: 8.9 G/DL — LOW (ref 11.5–15.5)
HYPOCHROMIA BLD QL: SLIGHT — SIGNIFICANT CHANGE UP
LYMPHOCYTES # BLD AUTO: 1.04 K/UL — SIGNIFICANT CHANGE UP (ref 1–3.3)
LYMPHOCYTES # BLD AUTO: 17.4 % — SIGNIFICANT CHANGE UP (ref 13–44)
MACROCYTES BLD QL: SLIGHT — SIGNIFICANT CHANGE UP
MAGNESIUM SERPL-MCNC: 1.8 MG/DL — SIGNIFICANT CHANGE UP (ref 1.8–2.6)
MANUAL SMEAR VERIFICATION: SIGNIFICANT CHANGE UP
MCHC RBC-ENTMCNC: 33.1 GM/DL — SIGNIFICANT CHANGE UP (ref 32–36)
MCHC RBC-ENTMCNC: 34.8 PG — HIGH (ref 27–34)
MCV RBC AUTO: 105.1 FL — HIGH (ref 80–100)
METHOD TYPE: SIGNIFICANT CHANGE UP
MICROCYTES BLD QL: SLIGHT — SIGNIFICANT CHANGE UP
MONOCYTES # BLD AUTO: 0.26 K/UL — SIGNIFICANT CHANGE UP (ref 0–0.9)
MONOCYTES NFR BLD AUTO: 4.3 % — SIGNIFICANT CHANGE UP (ref 2–14)
MYELOCYTES NFR BLD: 0.9 % — HIGH (ref 0–0)
NEUTROPHILS # BLD AUTO: 4.56 K/UL — SIGNIFICANT CHANGE UP (ref 1.8–7.4)
NEUTROPHILS NFR BLD AUTO: 76.5 % — SIGNIFICANT CHANGE UP (ref 43–77)
NRBC # BLD: 1 /100 WBCS — HIGH (ref 0–0)
ORGANISM # SPEC MICROSCOPIC CNT: ABNORMAL
ORGANISM # SPEC MICROSCOPIC CNT: SIGNIFICANT CHANGE UP
PHOSPHATE SERPL-MCNC: 3.6 MG/DL — SIGNIFICANT CHANGE UP (ref 2.4–4.7)
PLAT MORPH BLD: NORMAL — SIGNIFICANT CHANGE UP
PLATELET # BLD AUTO: 151 K/UL — SIGNIFICANT CHANGE UP (ref 150–400)
POLYCHROMASIA BLD QL SMEAR: SLIGHT — SIGNIFICANT CHANGE UP
POTASSIUM SERPL-MCNC: 3.8 MMOL/L — SIGNIFICANT CHANGE UP (ref 3.5–5.3)
POTASSIUM SERPL-SCNC: 3.8 MMOL/L — SIGNIFICANT CHANGE UP (ref 3.5–5.3)
RBC # BLD: 2.56 M/UL — LOW (ref 3.8–5.2)
RBC # FLD: 14.5 % — SIGNIFICANT CHANGE UP (ref 10.3–14.5)
RBC BLD AUTO: ABNORMAL
SODIUM SERPL-SCNC: 136 MMOL/L — SIGNIFICANT CHANGE UP (ref 135–145)
SPECIMEN SOURCE: SIGNIFICANT CHANGE UP
WBC # BLD: 5.96 K/UL — SIGNIFICANT CHANGE UP (ref 3.8–10.5)
WBC # FLD AUTO: 5.96 K/UL — SIGNIFICANT CHANGE UP (ref 3.8–10.5)

## 2024-09-01 PROCEDURE — 99231 SBSQ HOSP IP/OBS SF/LOW 25: CPT

## 2024-09-01 RX ORDER — POTASSIUM CHLORIDE 10 MEQ
40 TABLET, EXT RELEASE, PARTICLES/CRYSTALS ORAL ONCE
Refills: 0 | Status: COMPLETED | OUTPATIENT
Start: 2024-09-01 | End: 2024-09-01

## 2024-09-01 RX ADMIN — ENOXAPARIN SODIUM 30 MILLIGRAM(S): 100 INJECTION SUBCUTANEOUS at 17:49

## 2024-09-01 RX ADMIN — OXYCODONE HYDROCHLORIDE 5 MILLIGRAM(S): 5 TABLET ORAL at 06:12

## 2024-09-01 RX ADMIN — Medication 1 TABLET(S): at 11:30

## 2024-09-01 RX ADMIN — Medication 40 MILLIEQUIVALENT(S): at 18:01

## 2024-09-01 RX ADMIN — ENOXAPARIN SODIUM 30 MILLIGRAM(S): 100 INJECTION SUBCUTANEOUS at 05:07

## 2024-09-01 RX ADMIN — OXYCODONE HYDROCHLORIDE 5 MILLIGRAM(S): 5 TABLET ORAL at 13:23

## 2024-09-01 RX ADMIN — TAMSULOSIN HYDROCHLORIDE 0.4 MILLIGRAM(S): 0.4 CAPSULE ORAL at 21:59

## 2024-09-01 RX ADMIN — Medication 25 GRAM(S): at 18:01

## 2024-09-01 RX ADMIN — LEVETIRACETAM 500 MILLIGRAM(S): 1000 TABLET ORAL at 05:04

## 2024-09-01 RX ADMIN — OXYCODONE HYDROCHLORIDE 5 MILLIGRAM(S): 5 TABLET ORAL at 09:19

## 2024-09-01 RX ADMIN — Medication 40 MILLIGRAM(S): at 05:06

## 2024-09-01 RX ADMIN — LEVETIRACETAM 500 MILLIGRAM(S): 1000 TABLET ORAL at 17:49

## 2024-09-01 RX ADMIN — OXYCODONE HYDROCHLORIDE 5 MILLIGRAM(S): 5 TABLET ORAL at 22:50

## 2024-09-01 RX ADMIN — OXYCODONE HYDROCHLORIDE 5 MILLIGRAM(S): 5 TABLET ORAL at 21:59

## 2024-09-01 RX ADMIN — Medication 25 GRAM(S): at 11:32

## 2024-09-01 RX ADMIN — Medication 1 MILLIGRAM(S): at 11:31

## 2024-09-01 RX ADMIN — TIOTROPIUM BROMIDE INHALATION SPRAY 2 PUFF(S): 3.12 SPRAY, METERED RESPIRATORY (INHALATION) at 09:18

## 2024-09-01 RX ADMIN — OXYCODONE HYDROCHLORIDE 5 MILLIGRAM(S): 5 TABLET ORAL at 17:49

## 2024-09-01 RX ADMIN — OXYCODONE HYDROCHLORIDE 5 MILLIGRAM(S): 5 TABLET ORAL at 05:17

## 2024-09-02 LAB
ACANTHOCYTES BLD QL SMEAR: SLIGHT — SIGNIFICANT CHANGE UP
ALBUMIN SERPL ELPH-MCNC: 3.6 G/DL — SIGNIFICANT CHANGE UP (ref 3.3–5.2)
ALP SERPL-CCNC: 108 U/L — SIGNIFICANT CHANGE UP (ref 40–120)
ALT FLD-CCNC: 31 U/L — SIGNIFICANT CHANGE UP
ANION GAP SERPL CALC-SCNC: 12 MMOL/L — SIGNIFICANT CHANGE UP (ref 5–17)
ANION GAP SERPL CALC-SCNC: 7 MMOL/L — SIGNIFICANT CHANGE UP (ref 5–17)
APTT BLD: 34.6 SEC — SIGNIFICANT CHANGE UP (ref 24.5–35.6)
AST SERPL-CCNC: 44 U/L — HIGH
BASOPHILS # BLD AUTO: 0 K/UL — SIGNIFICANT CHANGE UP (ref 0–0.2)
BASOPHILS # BLD AUTO: 0.04 K/UL — SIGNIFICANT CHANGE UP (ref 0–0.2)
BASOPHILS NFR BLD AUTO: 0 % — SIGNIFICANT CHANGE UP (ref 0–2)
BASOPHILS NFR BLD AUTO: 0.7 % — SIGNIFICANT CHANGE UP (ref 0–2)
BILIRUB SERPL-MCNC: 0.4 MG/DL — SIGNIFICANT CHANGE UP (ref 0.4–2)
BUN SERPL-MCNC: 6 MG/DL — LOW (ref 8–20)
BUN SERPL-MCNC: 6.1 MG/DL — LOW (ref 8–20)
CALCIUM SERPL-MCNC: 8.4 MG/DL — SIGNIFICANT CHANGE UP (ref 8.4–10.5)
CALCIUM SERPL-MCNC: 8.8 MG/DL — SIGNIFICANT CHANGE UP (ref 8.4–10.5)
CHLORIDE SERPL-SCNC: 105 MMOL/L — SIGNIFICANT CHANGE UP (ref 96–108)
CHLORIDE SERPL-SCNC: 94 MMOL/L — LOW (ref 96–108)
CO2 SERPL-SCNC: 27 MMOL/L — SIGNIFICANT CHANGE UP (ref 22–29)
CO2 SERPL-SCNC: 28 MMOL/L — SIGNIFICANT CHANGE UP (ref 22–29)
CREAT SERPL-MCNC: 0.5 MG/DL — SIGNIFICANT CHANGE UP (ref 0.5–1.3)
CREAT SERPL-MCNC: 0.53 MG/DL — SIGNIFICANT CHANGE UP (ref 0.5–1.3)
EGFR: 108 ML/MIN/1.73M2 — SIGNIFICANT CHANGE UP
EGFR: 109 ML/MIN/1.73M2 — SIGNIFICANT CHANGE UP
EOSINOPHIL # BLD AUTO: 0 K/UL — SIGNIFICANT CHANGE UP (ref 0–0.5)
EOSINOPHIL # BLD AUTO: 0.05 K/UL — SIGNIFICANT CHANGE UP (ref 0–0.5)
EOSINOPHIL NFR BLD AUTO: 0 % — SIGNIFICANT CHANGE UP (ref 0–6)
EOSINOPHIL NFR BLD AUTO: 0.8 % — SIGNIFICANT CHANGE UP (ref 0–6)
GIANT PLATELETS BLD QL SMEAR: PRESENT — SIGNIFICANT CHANGE UP
GLUCOSE BLDC GLUCOMTR-MCNC: 103 MG/DL — HIGH (ref 70–99)
GLUCOSE SERPL-MCNC: 79 MG/DL — SIGNIFICANT CHANGE UP (ref 70–99)
GLUCOSE SERPL-MCNC: 89 MG/DL — SIGNIFICANT CHANGE UP (ref 70–99)
HCT VFR BLD CALC: 23.9 % — LOW (ref 34.5–45)
HCT VFR BLD CALC: 27.8 % — LOW (ref 34.5–45)
HCT VFR BLD CALC: 28.1 % — LOW (ref 34.5–45)
HGB BLD-MCNC: 7.8 G/DL — LOW (ref 11.5–15.5)
HGB BLD-MCNC: 9 G/DL — LOW (ref 11.5–15.5)
HGB BLD-MCNC: 9.5 G/DL — LOW (ref 11.5–15.5)
IMM GRANULOCYTES NFR BLD AUTO: 1.8 % — HIGH (ref 0–0.9)
INR BLD: 0.94 RATIO — SIGNIFICANT CHANGE UP (ref 0.85–1.18)
LACTATE SERPL-SCNC: 1.9 MMOL/L — SIGNIFICANT CHANGE UP (ref 0.5–2)
LYMPHOCYTES # BLD AUTO: 1.1 K/UL — SIGNIFICANT CHANGE UP (ref 1–3.3)
LYMPHOCYTES # BLD AUTO: 1.27 K/UL — SIGNIFICANT CHANGE UP (ref 1–3.3)
LYMPHOCYTES # BLD AUTO: 18.1 % — SIGNIFICANT CHANGE UP (ref 13–44)
LYMPHOCYTES # BLD AUTO: 25.4 % — SIGNIFICANT CHANGE UP (ref 13–44)
MACROCYTES BLD QL: SLIGHT — SIGNIFICANT CHANGE UP
MAGNESIUM SERPL-MCNC: 2.1 MG/DL — SIGNIFICANT CHANGE UP (ref 1.6–2.6)
MANUAL SMEAR VERIFICATION: SIGNIFICANT CHANGE UP
MCHC RBC-ENTMCNC: 32.6 GM/DL — SIGNIFICANT CHANGE UP (ref 32–36)
MCHC RBC-ENTMCNC: 33.8 GM/DL — SIGNIFICANT CHANGE UP (ref 32–36)
MCHC RBC-ENTMCNC: 35.1 PG — HIGH (ref 27–34)
MCHC RBC-ENTMCNC: 35.6 PG — HIGH (ref 27–34)
MCV RBC AUTO: 105.2 FL — HIGH (ref 80–100)
MCV RBC AUTO: 107.7 FL — HIGH (ref 80–100)
MONOCYTES # BLD AUTO: 0.35 K/UL — SIGNIFICANT CHANGE UP (ref 0–0.9)
MONOCYTES # BLD AUTO: 0.96 K/UL — HIGH (ref 0–0.9)
MONOCYTES NFR BLD AUTO: 15.8 % — HIGH (ref 2–14)
MONOCYTES NFR BLD AUTO: 7 % — SIGNIFICANT CHANGE UP (ref 2–14)
NEUTROPHILS # BLD AUTO: 3.33 K/UL — SIGNIFICANT CHANGE UP (ref 1.8–7.4)
NEUTROPHILS # BLD AUTO: 3.82 K/UL — SIGNIFICANT CHANGE UP (ref 1.8–7.4)
NEUTROPHILS NFR BLD AUTO: 62.8 % — SIGNIFICANT CHANGE UP (ref 43–77)
NEUTROPHILS NFR BLD AUTO: 64.9 % — SIGNIFICANT CHANGE UP (ref 43–77)
NEUTS BAND # BLD: 1.8 % — SIGNIFICANT CHANGE UP (ref 0–8)
NRBC # BLD: 1 /100 WBCS — HIGH (ref 0–0)
PHOSPHATE SERPL-MCNC: 3.8 MG/DL — SIGNIFICANT CHANGE UP (ref 2.4–4.7)
PLAT MORPH BLD: SIGNIFICANT CHANGE UP
PLATELET # BLD AUTO: 150 K/UL — SIGNIFICANT CHANGE UP (ref 150–400)
PLATELET # BLD AUTO: 179 K/UL — SIGNIFICANT CHANGE UP (ref 150–400)
POLYCHROMASIA BLD QL SMEAR: SIGNIFICANT CHANGE UP
POTASSIUM SERPL-MCNC: 4.1 MMOL/L — SIGNIFICANT CHANGE UP (ref 3.5–5.3)
POTASSIUM SERPL-MCNC: 4.4 MMOL/L — SIGNIFICANT CHANGE UP (ref 3.5–5.3)
POTASSIUM SERPL-SCNC: 4.1 MMOL/L — SIGNIFICANT CHANGE UP (ref 3.5–5.3)
POTASSIUM SERPL-SCNC: 4.4 MMOL/L — SIGNIFICANT CHANGE UP (ref 3.5–5.3)
PROMYELOCYTES # FLD: 0.9 % — HIGH (ref 0–0)
PROT SERPL-MCNC: 6.2 G/DL — LOW (ref 6.6–8.7)
PROTHROM AB SERPL-ACNC: 10.5 SEC — SIGNIFICANT CHANGE UP (ref 9.5–13)
RBC # BLD: 2.22 M/UL — LOW (ref 3.8–5.2)
RBC # BLD: 2.67 M/UL — LOW (ref 3.8–5.2)
RBC # FLD: 15 % — HIGH (ref 10.3–14.5)
RBC # FLD: 15.1 % — HIGH (ref 10.3–14.5)
RBC BLD AUTO: ABNORMAL
SODIUM SERPL-SCNC: 133 MMOL/L — LOW (ref 135–145)
SODIUM SERPL-SCNC: 140 MMOL/L — SIGNIFICANT CHANGE UP (ref 135–145)
WBC # BLD: 4.99 K/UL — SIGNIFICANT CHANGE UP (ref 3.8–10.5)
WBC # BLD: 6.08 K/UL — SIGNIFICANT CHANGE UP (ref 3.8–10.5)
WBC # FLD AUTO: 4.99 K/UL — SIGNIFICANT CHANGE UP (ref 3.8–10.5)
WBC # FLD AUTO: 6.08 K/UL — SIGNIFICANT CHANGE UP (ref 3.8–10.5)

## 2024-09-02 PROCEDURE — 99232 SBSQ HOSP IP/OBS MODERATE 35: CPT

## 2024-09-02 RX ORDER — SODIUM CHLORIDE 9 MG/ML
500 INJECTION INTRAMUSCULAR; INTRAVENOUS; SUBCUTANEOUS ONCE
Refills: 0 | Status: COMPLETED | OUTPATIENT
Start: 2024-09-02 | End: 2024-09-02

## 2024-09-02 RX ORDER — OXYCODONE HYDROCHLORIDE 5 MG/1
10 TABLET ORAL EVERY 6 HOURS
Refills: 0 | Status: DISCONTINUED | OUTPATIENT
Start: 2024-09-02 | End: 2024-09-05

## 2024-09-02 RX ORDER — ACETAMINOPHEN 325 MG/1
725 TABLET ORAL ONCE
Refills: 0 | Status: COMPLETED | OUTPATIENT
Start: 2024-09-02 | End: 2024-09-02

## 2024-09-02 RX ORDER — ACETAMINOPHEN 325 MG/1
975 TABLET ORAL EVERY 6 HOURS
Refills: 0 | Status: DISCONTINUED | OUTPATIENT
Start: 2024-09-02 | End: 2024-09-05

## 2024-09-02 RX ORDER — OXYCODONE HYDROCHLORIDE 5 MG/1
5 TABLET ORAL EVERY 6 HOURS
Refills: 0 | Status: DISCONTINUED | OUTPATIENT
Start: 2024-09-02 | End: 2024-09-05

## 2024-09-02 RX ORDER — SODIUM CHLORIDE 9 MG/ML
1500 INJECTION INTRAMUSCULAR; INTRAVENOUS; SUBCUTANEOUS ONCE
Refills: 0 | Status: COMPLETED | OUTPATIENT
Start: 2024-09-02 | End: 2024-09-02

## 2024-09-02 RX ORDER — NALOXONE HCL 1 MG/ML
5 VIAL (ML) INJECTION ONCE
Refills: 0 | Status: DISCONTINUED | OUTPATIENT
Start: 2024-09-02 | End: 2024-09-05

## 2024-09-02 RX ORDER — OXYCODONE HYDROCHLORIDE 5 MG/1
2.5 TABLET ORAL EVERY 6 HOURS
Refills: 0 | Status: DISCONTINUED | OUTPATIENT
Start: 2024-09-02 | End: 2024-09-03

## 2024-09-02 RX ADMIN — Medication 1000 MILLIGRAM(S): at 00:48

## 2024-09-02 RX ADMIN — LEVETIRACETAM 500 MILLIGRAM(S): 1000 TABLET ORAL at 05:40

## 2024-09-02 RX ADMIN — OXYCODONE HYDROCHLORIDE 10 MILLIGRAM(S): 5 TABLET ORAL at 13:48

## 2024-09-02 RX ADMIN — TIOTROPIUM BROMIDE INHALATION SPRAY 2 PUFF(S): 3.12 SPRAY, METERED RESPIRATORY (INHALATION) at 08:26

## 2024-09-02 RX ADMIN — ENOXAPARIN SODIUM 30 MILLIGRAM(S): 100 INJECTION SUBCUTANEOUS at 17:21

## 2024-09-02 RX ADMIN — OXYCODONE HYDROCHLORIDE 10 MILLIGRAM(S): 5 TABLET ORAL at 13:00

## 2024-09-02 RX ADMIN — ACETAMINOPHEN 290 MILLIGRAM(S): 325 TABLET ORAL at 00:31

## 2024-09-02 RX ADMIN — OXYCODONE HYDROCHLORIDE 5 MILLIGRAM(S): 5 TABLET ORAL at 09:00

## 2024-09-02 RX ADMIN — PSYLLIUM HUSK 1 PACKET(S): 3.4 GRANULE ORAL at 11:38

## 2024-09-02 RX ADMIN — SODIUM CHLORIDE 1500 MILLILITER(S): 9 INJECTION INTRAMUSCULAR; INTRAVENOUS; SUBCUTANEOUS at 01:00

## 2024-09-02 RX ADMIN — Medication 1 TABLET(S): at 11:38

## 2024-09-02 RX ADMIN — ENOXAPARIN SODIUM 30 MILLIGRAM(S): 100 INJECTION SUBCUTANEOUS at 05:42

## 2024-09-02 RX ADMIN — OXYCODONE HYDROCHLORIDE 10 MILLIGRAM(S): 5 TABLET ORAL at 20:20

## 2024-09-02 RX ADMIN — SODIUM CHLORIDE 500 MILLILITER(S): 9 INJECTION INTRAMUSCULAR; INTRAVENOUS; SUBCUTANEOUS at 23:53

## 2024-09-02 RX ADMIN — ACETAMINOPHEN 725 MILLIGRAM(S): 325 TABLET ORAL at 01:30

## 2024-09-02 RX ADMIN — Medication 40 MILLIGRAM(S): at 05:40

## 2024-09-02 RX ADMIN — TAMSULOSIN HYDROCHLORIDE 0.4 MILLIGRAM(S): 0.4 CAPSULE ORAL at 21:35

## 2024-09-02 RX ADMIN — OXYCODONE HYDROCHLORIDE 5 MILLIGRAM(S): 5 TABLET ORAL at 08:25

## 2024-09-02 RX ADMIN — Medication 1 MILLIGRAM(S): at 11:38

## 2024-09-02 RX ADMIN — OXYCODONE HYDROCHLORIDE 5 MILLIGRAM(S): 5 TABLET ORAL at 02:06

## 2024-09-02 RX ADMIN — LEVETIRACETAM 500 MILLIGRAM(S): 1000 TABLET ORAL at 17:22

## 2024-09-02 RX ADMIN — OXYCODONE HYDROCHLORIDE 10 MILLIGRAM(S): 5 TABLET ORAL at 19:33

## 2024-09-02 RX ADMIN — OXYCODONE HYDROCHLORIDE 5 MILLIGRAM(S): 5 TABLET ORAL at 03:00

## 2024-09-02 NOTE — OCCUPATIONAL THERAPY INITIAL EVALUATION ADULT - GENERAL OBSERVATIONS, REHAB EVAL
Left pt as received semifowler in bed, NAD, +IV lock, +L VCB on RA bed alarm in place A&OX4. Pre/post pain 8/10, BLE; RN aware. Pt agreeable to OT evaluation

## 2024-09-02 NOTE — CHART NOTE - NSCHARTNOTEFT_GEN_A_CORE
Code sepsis called for patient with HR of 110 and temp of 102.7. other vitals included O2 of 96, /71, RR 17. Patient complaint of continued epigastric pain. Patient had a positive urine Cx on 8/29 for e.coli, though WBC and leuk esterase were normal and it had high epithelial cells so specimen is likely contaminated, abx ordered to cover possible UTI. Labs including blood cx drawn, ofirmev and bolus given. Belly was soft and mildly tender to epigastrium.

## 2024-09-03 LAB
ANION GAP SERPL CALC-SCNC: 8 MMOL/L — SIGNIFICANT CHANGE UP (ref 5–17)
ANION GAP SERPL CALC-SCNC: 8 MMOL/L — SIGNIFICANT CHANGE UP (ref 5–17)
ANISOCYTOSIS BLD QL: SLIGHT — SIGNIFICANT CHANGE UP
APPEARANCE UR: CLEAR — SIGNIFICANT CHANGE UP
BASOPHILS # BLD AUTO: 0.1 K/UL — SIGNIFICANT CHANGE UP (ref 0–0.2)
BASOPHILS NFR BLD AUTO: 2.6 % — HIGH (ref 0–2)
BILIRUB UR-MCNC: NEGATIVE — SIGNIFICANT CHANGE UP
BLD GP AB SCN SERPL QL: SIGNIFICANT CHANGE UP
BUN SERPL-MCNC: 6.9 MG/DL — LOW (ref 8–20)
BUN SERPL-MCNC: 7.1 MG/DL — LOW (ref 8–20)
CALCIUM SERPL-MCNC: 8.9 MG/DL — SIGNIFICANT CHANGE UP (ref 8.4–10.5)
CALCIUM SERPL-MCNC: 9.1 MG/DL — SIGNIFICANT CHANGE UP (ref 8.4–10.5)
CHLORIDE SERPL-SCNC: 101 MMOL/L — SIGNIFICANT CHANGE UP (ref 96–108)
CHLORIDE SERPL-SCNC: 105 MMOL/L — SIGNIFICANT CHANGE UP (ref 96–108)
CO2 SERPL-SCNC: 28 MMOL/L — SIGNIFICANT CHANGE UP (ref 22–29)
CO2 SERPL-SCNC: 29 MMOL/L — SIGNIFICANT CHANGE UP (ref 22–29)
COLOR SPEC: YELLOW — SIGNIFICANT CHANGE UP
CREAT SERPL-MCNC: 0.45 MG/DL — LOW (ref 0.5–1.3)
CREAT SERPL-MCNC: 0.5 MG/DL — SIGNIFICANT CHANGE UP (ref 0.5–1.3)
DIFF PNL FLD: NEGATIVE — SIGNIFICANT CHANGE UP
EGFR: 109 ML/MIN/1.73M2 — SIGNIFICANT CHANGE UP
EGFR: 112 ML/MIN/1.73M2 — SIGNIFICANT CHANGE UP
EOSINOPHIL # BLD AUTO: 0.17 K/UL — SIGNIFICANT CHANGE UP (ref 0–0.5)
EOSINOPHIL NFR BLD AUTO: 4.4 % — SIGNIFICANT CHANGE UP (ref 0–6)
GIANT PLATELETS BLD QL SMEAR: PRESENT — SIGNIFICANT CHANGE UP
GLUCOSE SERPL-MCNC: 103 MG/DL — HIGH (ref 70–99)
GLUCOSE SERPL-MCNC: 87 MG/DL — SIGNIFICANT CHANGE UP (ref 70–99)
GLUCOSE UR QL: NEGATIVE MG/DL — SIGNIFICANT CHANGE UP
HCT VFR BLD CALC: 24.9 % — LOW (ref 34.5–45)
HCT VFR BLD CALC: 25.8 % — LOW (ref 34.5–45)
HGB BLD-MCNC: 8.2 G/DL — LOW (ref 11.5–15.5)
HGB BLD-MCNC: 8.2 G/DL — LOW (ref 11.5–15.5)
HYPOCHROMIA BLD QL: SLIGHT — SIGNIFICANT CHANGE UP
KETONES UR-MCNC: NEGATIVE MG/DL — SIGNIFICANT CHANGE UP
LEUKOCYTE ESTERASE UR-ACNC: NEGATIVE — SIGNIFICANT CHANGE UP
LYMPHOCYTES # BLD AUTO: 1.25 K/UL — SIGNIFICANT CHANGE UP (ref 1–3.3)
LYMPHOCYTES # BLD AUTO: 31.9 % — SIGNIFICANT CHANGE UP (ref 13–44)
MACROCYTES BLD QL: SLIGHT — SIGNIFICANT CHANGE UP
MAGNESIUM SERPL-MCNC: 2.1 MG/DL — SIGNIFICANT CHANGE UP (ref 1.6–2.6)
MANUAL SMEAR VERIFICATION: SIGNIFICANT CHANGE UP
MCHC RBC-ENTMCNC: 31.8 GM/DL — LOW (ref 32–36)
MCHC RBC-ENTMCNC: 32.9 GM/DL — SIGNIFICANT CHANGE UP (ref 32–36)
MCHC RBC-ENTMCNC: 34.6 PG — HIGH (ref 27–34)
MCHC RBC-ENTMCNC: 35.2 PG — HIGH (ref 27–34)
MCV RBC AUTO: 106.9 FL — HIGH (ref 80–100)
MCV RBC AUTO: 108.9 FL — HIGH (ref 80–100)
MONOCYTES # BLD AUTO: 0.28 K/UL — SIGNIFICANT CHANGE UP (ref 0–0.9)
MONOCYTES NFR BLD AUTO: 7.1 % — SIGNIFICANT CHANGE UP (ref 2–14)
NEUTROPHILS # BLD AUTO: 2.12 K/UL — SIGNIFICANT CHANGE UP (ref 1.8–7.4)
NEUTROPHILS NFR BLD AUTO: 54 % — SIGNIFICANT CHANGE UP (ref 43–77)
NITRITE UR-MCNC: NEGATIVE — SIGNIFICANT CHANGE UP
PH UR: 7.5 — SIGNIFICANT CHANGE UP (ref 5–8)
PHOSPHATE SERPL-MCNC: 4.1 MG/DL — SIGNIFICANT CHANGE UP (ref 2.4–4.7)
PLAT MORPH BLD: NORMAL — SIGNIFICANT CHANGE UP
PLATELET # BLD AUTO: 223 K/UL — SIGNIFICANT CHANGE UP (ref 150–400)
PLATELET # BLD AUTO: 263 K/UL — SIGNIFICANT CHANGE UP (ref 150–400)
POLYCHROMASIA BLD QL SMEAR: SLIGHT — SIGNIFICANT CHANGE UP
POTASSIUM SERPL-MCNC: 4.1 MMOL/L — SIGNIFICANT CHANGE UP (ref 3.5–5.3)
POTASSIUM SERPL-MCNC: 4.8 MMOL/L — SIGNIFICANT CHANGE UP (ref 3.5–5.3)
POTASSIUM SERPL-SCNC: 4.1 MMOL/L — SIGNIFICANT CHANGE UP (ref 3.5–5.3)
POTASSIUM SERPL-SCNC: 4.8 MMOL/L — SIGNIFICANT CHANGE UP (ref 3.5–5.3)
PROCALCITONIN SERPL-MCNC: 1.01 NG/ML — HIGH (ref 0.02–0.1)
PROT UR-MCNC: NEGATIVE MG/DL — SIGNIFICANT CHANGE UP
RBC # BLD: 2.33 M/UL — LOW (ref 3.8–5.2)
RBC # BLD: 2.37 M/UL — LOW (ref 3.8–5.2)
RBC # FLD: 14.3 % — SIGNIFICANT CHANGE UP (ref 10.3–14.5)
RBC # FLD: 14.6 % — HIGH (ref 10.3–14.5)
RBC BLD AUTO: ABNORMAL
SMUDGE CELLS # BLD: PRESENT — SIGNIFICANT CHANGE UP
SODIUM SERPL-SCNC: 137 MMOL/L — SIGNIFICANT CHANGE UP (ref 135–145)
SODIUM SERPL-SCNC: 142 MMOL/L — SIGNIFICANT CHANGE UP (ref 135–145)
SP GR SPEC: 1.01 — SIGNIFICANT CHANGE UP (ref 1–1.03)
UROBILINOGEN FLD QL: 0.2 MG/DL — SIGNIFICANT CHANGE UP (ref 0.2–1)
WBC # BLD: 3.93 K/UL — SIGNIFICANT CHANGE UP (ref 3.8–10.5)
WBC # BLD: 4.9 K/UL — SIGNIFICANT CHANGE UP (ref 3.8–10.5)
WBC # FLD AUTO: 3.93 K/UL — SIGNIFICANT CHANGE UP (ref 3.8–10.5)
WBC # FLD AUTO: 4.9 K/UL — SIGNIFICANT CHANGE UP (ref 3.8–10.5)

## 2024-09-03 PROCEDURE — 99222 1ST HOSP IP/OBS MODERATE 55: CPT

## 2024-09-03 PROCEDURE — 99223 1ST HOSP IP/OBS HIGH 75: CPT

## 2024-09-03 PROCEDURE — 71045 X-RAY EXAM CHEST 1 VIEW: CPT | Mod: 26

## 2024-09-03 PROCEDURE — 99231 SBSQ HOSP IP/OBS SF/LOW 25: CPT

## 2024-09-03 PROCEDURE — 93010 ELECTROCARDIOGRAM REPORT: CPT

## 2024-09-03 RX ORDER — PSYLLIUM HUSK 0.4 G
1 CAPSULE ORAL DAILY
Refills: 0 | Status: DISCONTINUED | OUTPATIENT
Start: 2024-09-03 | End: 2024-09-05

## 2024-09-03 RX ORDER — THIAMINE HCL 250 MG
100 TABLET ORAL DAILY
Refills: 0 | Status: DISCONTINUED | OUTPATIENT
Start: 2024-09-03 | End: 2024-09-05

## 2024-09-03 RX ORDER — ONDANSETRON 2 MG/ML
4 INJECTION, SOLUTION INTRAMUSCULAR; INTRAVENOUS ONCE
Refills: 0 | Status: COMPLETED | OUTPATIENT
Start: 2024-09-03 | End: 2024-09-03

## 2024-09-03 RX ORDER — FOLIC ACID 1 MG
1 TABLET ORAL DAILY
Refills: 0 | Status: DISCONTINUED | OUTPATIENT
Start: 2024-09-03 | End: 2024-09-05

## 2024-09-03 RX ADMIN — Medication 1 TABLET(S): at 11:15

## 2024-09-03 RX ADMIN — Medication 500 MILLILITER(S): at 08:06

## 2024-09-03 RX ADMIN — OXYCODONE HYDROCHLORIDE 5 MILLIGRAM(S): 5 TABLET ORAL at 02:08

## 2024-09-03 RX ADMIN — ACETAMINOPHEN 975 MILLIGRAM(S): 325 TABLET ORAL at 05:57

## 2024-09-03 RX ADMIN — OXYCODONE HYDROCHLORIDE 10 MILLIGRAM(S): 5 TABLET ORAL at 22:54

## 2024-09-03 RX ADMIN — OXYCODONE HYDROCHLORIDE 2.5 MILLIGRAM(S): 5 TABLET ORAL at 20:54

## 2024-09-03 RX ADMIN — PSYLLIUM HUSK 1 PACKET(S): 3.4 GRANULE ORAL at 11:15

## 2024-09-03 RX ADMIN — OXYCODONE HYDROCHLORIDE 10 MILLIGRAM(S): 5 TABLET ORAL at 09:59

## 2024-09-03 RX ADMIN — ACETAMINOPHEN 975 MILLIGRAM(S): 325 TABLET ORAL at 06:53

## 2024-09-03 RX ADMIN — Medication 1000 MILLIGRAM(S): at 00:17

## 2024-09-03 RX ADMIN — ENOXAPARIN SODIUM 30 MILLIGRAM(S): 100 INJECTION SUBCUTANEOUS at 05:39

## 2024-09-03 RX ADMIN — Medication 500 MILLILITER(S): at 13:39

## 2024-09-03 RX ADMIN — OXYCODONE HYDROCHLORIDE 5 MILLIGRAM(S): 5 TABLET ORAL at 03:00

## 2024-09-03 RX ADMIN — Medication 40 MILLIGRAM(S): at 05:42

## 2024-09-03 RX ADMIN — OXYCODONE HYDROCHLORIDE 10 MILLIGRAM(S): 5 TABLET ORAL at 16:50

## 2024-09-03 RX ADMIN — LEVETIRACETAM 500 MILLIGRAM(S): 1000 TABLET ORAL at 17:23

## 2024-09-03 RX ADMIN — OXYCODONE HYDROCHLORIDE 2.5 MILLIGRAM(S): 5 TABLET ORAL at 19:54

## 2024-09-03 RX ADMIN — TIOTROPIUM BROMIDE INHALATION SPRAY 2 PUFF(S): 3.12 SPRAY, METERED RESPIRATORY (INHALATION) at 08:07

## 2024-09-03 RX ADMIN — OXYCODONE HYDROCHLORIDE 10 MILLIGRAM(S): 5 TABLET ORAL at 21:59

## 2024-09-03 RX ADMIN — ACETAMINOPHEN 725 MILLIGRAM(S): 325 TABLET ORAL at 01:15

## 2024-09-03 RX ADMIN — Medication 1 MILLIGRAM(S): at 11:15

## 2024-09-03 RX ADMIN — OXYCODONE HYDROCHLORIDE 10 MILLIGRAM(S): 5 TABLET ORAL at 09:05

## 2024-09-03 RX ADMIN — ENOXAPARIN SODIUM 30 MILLIGRAM(S): 100 INJECTION SUBCUTANEOUS at 17:23

## 2024-09-03 RX ADMIN — Medication 100 MILLIGRAM(S): at 11:15

## 2024-09-03 RX ADMIN — OXYCODONE HYDROCHLORIDE 10 MILLIGRAM(S): 5 TABLET ORAL at 15:56

## 2024-09-03 RX ADMIN — ACETAMINOPHEN 290 MILLIGRAM(S): 325 TABLET ORAL at 00:15

## 2024-09-03 RX ADMIN — ONDANSETRON 4 MILLIGRAM(S): 2 INJECTION, SOLUTION INTRAMUSCULAR; INTRAVENOUS at 19:10

## 2024-09-03 RX ADMIN — LEVETIRACETAM 500 MILLIGRAM(S): 1000 TABLET ORAL at 05:40

## 2024-09-03 NOTE — BH CONSULTATION LIAISON ASSESSMENT NOTE - PATIENT'S CHIEF COMPLAINT
" feeling depressed...given up on life...my life stopped" " feeling depressed... given up on life... my life stopped"

## 2024-09-03 NOTE — BH CONSULTATION LIAISON ASSESSMENT NOTE - CURRENT MEDICATION
MEDICATIONS  (STANDING):  cefTRIAXone Injectable. 1000 milliGRAM(s) IV Push every 24 hours  enoxaparin Injectable 30 milliGRAM(s) SubCutaneous every 12 hours  folic acid 1 milliGRAM(s) Oral daily  levETIRAcetam   Injectable 500 milliGRAM(s) IV Push every 12 hours  multivitamin/minerals 1 Tablet(s) Oral daily  pantoprazole    Tablet 40 milliGRAM(s) Oral before breakfast  psyllium Powder 1 Packet(s) Oral daily  tamsulosin 0.4 milliGRAM(s) Oral at bedtime  thiamine 100 milliGRAM(s) Oral daily  tiotropium 2.5 MICROgram(s) Inhaler 2 Puff(s) Inhalation daily    MEDICATIONS  (PRN):  acetaminophen     Tablet .. 975 milliGRAM(s) Oral every 6 hours PRN Mild Pain (1 - 3)  aluminum hydroxide/magnesium hydroxide/simethicone Suspension 30 milliLiter(s) Oral every 6 hours PRN Dyspepsia  naloxone 1 mG/mL Injection for Intranasal Use 5 milliGRAM(s) IntraNasal once PRN overdose  oxyCODONE    IR 5 milliGRAM(s) Oral every 6 hours PRN Moderate Pain (4 - 6)  oxyCODONE    IR 10 milliGRAM(s) Oral every 6 hours PRN Severe Pain (7 - 10)  oxyCODONE    IR 2.5 milliGRAM(s) Oral every 6 hours PRN break through pain

## 2024-09-03 NOTE — BH CONSULTATION LIAISON ASSESSMENT NOTE - SUMMARY
Pt is a 59 year old unemployed, , female with a PMH of HLD, CAD on aspirin, non-compliant,  a PPHx of PELON, one suicide attempt (2015), one suicide hospitalization, no history of violence, and alcohol use disorder who [place] [facility] [place]/p [place] from standing. Psych is being consulted for depression and medical management.    Patient seen today at bedside by resident and NP.  Patient a/ox4.  Mood/affect depressed.  Patient with amotivation, anhedonia, poor ADLs.  Thought process goal directed and linear.  Motivated for [facility].  Patient denies any suicidal ideation/hi, intent or plan.  She denies any AVH or paranoia.     Patient consents to zoloft trial, which she would likely benefit from given the ongoing adjustment issues and hx of depression/anxiety.  Can utilize PRN meds for breakthrough anxiety at this time. From a psychiatric standpoint, she is stable for d/c and can follow up with psychiatrist at [facility].    RECS  adjustment d/o  Recommend new EKG as last qtc 480s  If QTc is within normal limits can start Zoloft 25mg PO Q daily, vistaril 25mg PO q6hr prn for anxiety
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
## 2024-09-03 NOTE — BH CONSULTATION LIAISON ASSESSMENT NOTE - NSBHCHARTREVIEWVS_PSY_A_CORE FT
Vital Signs Last 24 Hrs  T(C): 36.9 (03 Sep 2024 13:30), Max: 37.8 (02 Sep 2024 23:30)  T(F): 98.5 (03 Sep 2024 13:30), Max: 100.1 (02 Sep 2024 23:30)  HR: 90 (03 Sep 2024 14:30) (86 - 111)  BP: 113/74 (03 Sep 2024 14:30) (88/53 - 122/72)  BP(mean): --  RR: 18 (03 Sep 2024 13:30) (17 - 18)  SpO2: 96% (03 Sep 2024 13:30) (95% - 98%)    Parameters below as of 03 Sep 2024 13:30  Patient On (Oxygen Delivery Method): room air
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
## 2024-09-03 NOTE — BH CONSULTATION LIAISON ASSESSMENT NOTE - NSBHATTESTAPPBILLTIME_PSY_A_CORE
I attest my time as ELENI is greater than 50% of the total combined time spent on qualifying patient care activities. I have reviewed and verified the documentation.

## 2024-09-03 NOTE — BH CONSULTATION LIAISON ASSESSMENT NOTE - RISK ASSESSMENT
Risk factors: previous suicide attempt, alcohol use disorder  Protective factors: relationship with partner/fiance, 3 children
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
## 2024-09-03 NOTE — BH CONSULTATION LIAISON ASSESSMENT NOTE - NSBHSAALC_PSY_A_CORE FT
first use - years ago  last use - 2 weeks on admission (the last 3 months , one pint vodka every 3 days. Patient has since stopped during current hospitalization)

## 2024-09-04 LAB
ANION GAP SERPL CALC-SCNC: 10 MMOL/L — SIGNIFICANT CHANGE UP (ref 5–17)
ANISOCYTOSIS BLD QL: SLIGHT — SIGNIFICANT CHANGE UP
BASOPHILS # BLD AUTO: 0.04 K/UL — SIGNIFICANT CHANGE UP (ref 0–0.2)
BASOPHILS NFR BLD AUTO: 0.9 % — SIGNIFICANT CHANGE UP (ref 0–2)
BUN SERPL-MCNC: 6.7 MG/DL — LOW (ref 8–20)
CALCIUM SERPL-MCNC: 8.8 MG/DL — SIGNIFICANT CHANGE UP (ref 8.4–10.5)
CHLORIDE SERPL-SCNC: 101 MMOL/L — SIGNIFICANT CHANGE UP (ref 96–108)
CO2 SERPL-SCNC: 27 MMOL/L — SIGNIFICANT CHANGE UP (ref 22–29)
CREAT SERPL-MCNC: 0.55 MG/DL — SIGNIFICANT CHANGE UP (ref 0.5–1.3)
CULTURE RESULTS: NO GROWTH — SIGNIFICANT CHANGE UP
EGFR: 107 ML/MIN/1.73M2 — SIGNIFICANT CHANGE UP
EOSINOPHIL # BLD AUTO: 0.04 K/UL — SIGNIFICANT CHANGE UP (ref 0–0.5)
EOSINOPHIL NFR BLD AUTO: 0.9 % — SIGNIFICANT CHANGE UP (ref 0–6)
GIANT PLATELETS BLD QL SMEAR: PRESENT — SIGNIFICANT CHANGE UP
GLUCOSE SERPL-MCNC: 82 MG/DL — SIGNIFICANT CHANGE UP (ref 70–99)
HCT VFR BLD CALC: 23.9 % — LOW (ref 34.5–45)
HCT VFR BLD CALC: 24.5 % — LOW (ref 34.5–45)
HGB BLD-MCNC: 7.8 G/DL — LOW (ref 11.5–15.5)
HGB BLD-MCNC: 7.9 G/DL — LOW (ref 11.5–15.5)
HYPOCHROMIA BLD QL: SLIGHT — SIGNIFICANT CHANGE UP
LYMPHOCYTES # BLD AUTO: 1.35 K/UL — SIGNIFICANT CHANGE UP (ref 1–3.3)
LYMPHOCYTES # BLD AUTO: 27.3 % — SIGNIFICANT CHANGE UP (ref 13–44)
MACROCYTES BLD QL: SLIGHT — SIGNIFICANT CHANGE UP
MAGNESIUM SERPL-MCNC: 2 MG/DL — SIGNIFICANT CHANGE UP (ref 1.6–2.6)
MANUAL SMEAR VERIFICATION: SIGNIFICANT CHANGE UP
MCHC RBC-ENTMCNC: 32.2 GM/DL — SIGNIFICANT CHANGE UP (ref 32–36)
MCHC RBC-ENTMCNC: 34.8 PG — HIGH (ref 27–34)
MCV RBC AUTO: 107.9 FL — HIGH (ref 80–100)
METAMYELOCYTES # FLD: 0.9 % — HIGH (ref 0–0)
MICROCYTES BLD QL: SLIGHT — SIGNIFICANT CHANGE UP
MONOCYTES # BLD AUTO: 0.76 K/UL — SIGNIFICANT CHANGE UP (ref 0–0.9)
MONOCYTES NFR BLD AUTO: 15.5 % — HIGH (ref 2–14)
MYELOCYTES NFR BLD: 0.9 % — HIGH (ref 0–0)
NEUTROPHILS # BLD AUTO: 2.55 K/UL — SIGNIFICANT CHANGE UP (ref 1.8–7.4)
NEUTROPHILS NFR BLD AUTO: 51.8 % — SIGNIFICANT CHANGE UP (ref 43–77)
PHOSPHATE SERPL-MCNC: 3.8 MG/DL — SIGNIFICANT CHANGE UP (ref 2.4–4.7)
PLAT MORPH BLD: NORMAL — SIGNIFICANT CHANGE UP
PLATELET # BLD AUTO: 281 K/UL — SIGNIFICANT CHANGE UP (ref 150–400)
POLYCHROMASIA BLD QL SMEAR: SLIGHT — SIGNIFICANT CHANGE UP
POTASSIUM SERPL-MCNC: 3.9 MMOL/L — SIGNIFICANT CHANGE UP (ref 3.5–5.3)
POTASSIUM SERPL-SCNC: 3.9 MMOL/L — SIGNIFICANT CHANGE UP (ref 3.5–5.3)
PROMYELOCYTES # FLD: 0.9 % — HIGH (ref 0–0)
RBC # BLD: 2.27 M/UL — LOW (ref 3.8–5.2)
RBC # FLD: 14.4 % — SIGNIFICANT CHANGE UP (ref 10.3–14.5)
RBC BLD AUTO: ABNORMAL
SODIUM SERPL-SCNC: 138 MMOL/L — SIGNIFICANT CHANGE UP (ref 135–145)
SPECIMEN SOURCE: SIGNIFICANT CHANGE UP
VARIANT LYMPHS # BLD: 0.9 % — SIGNIFICANT CHANGE UP (ref 0–6)
WBC # BLD: 4.93 K/UL — SIGNIFICANT CHANGE UP (ref 3.8–10.5)
WBC # FLD AUTO: 4.93 K/UL — SIGNIFICANT CHANGE UP (ref 3.8–10.5)

## 2024-09-04 PROCEDURE — 71260 CT THORAX DX C+: CPT | Mod: 26

## 2024-09-04 PROCEDURE — 74177 CT ABD & PELVIS W/CONTRAST: CPT | Mod: 26

## 2024-09-04 PROCEDURE — 99231 SBSQ HOSP IP/OBS SF/LOW 25: CPT

## 2024-09-04 PROCEDURE — 99232 SBSQ HOSP IP/OBS MODERATE 35: CPT

## 2024-09-04 PROCEDURE — 99233 SBSQ HOSP IP/OBS HIGH 50: CPT

## 2024-09-04 RX ORDER — FOLIC ACID/MULTIVIT,IRON,MINER 0.4MG-18MG
2000 TABLET,CHEWABLE ORAL DAILY
Refills: 0 | Status: DISCONTINUED | OUTPATIENT
Start: 2024-09-04 | End: 2024-09-05

## 2024-09-04 RX ORDER — CYANOCOBALAMIN (VITAMIN B-12) 500MCG/0.1
1000 GEL (ML) NASAL DAILY
Refills: 0 | Status: DISCONTINUED | OUTPATIENT
Start: 2024-09-04 | End: 2024-09-05

## 2024-09-04 RX ORDER — LIDOCAINE/BENZALKONIUM/ALCOHOL
1 SOLUTION, NON-ORAL TOPICAL EVERY 24 HOURS
Refills: 0 | Status: DISCONTINUED | OUTPATIENT
Start: 2024-09-04 | End: 2024-09-05

## 2024-09-04 RX ORDER — HYDROXYZINE HCL 25 MG
25 TABLET ORAL EVERY 6 HOURS
Refills: 0 | Status: DISCONTINUED | OUTPATIENT
Start: 2024-09-04 | End: 2024-09-05

## 2024-09-04 RX ORDER — SERTRALINE HYDROCHLORIDE 50 MG/1
25 TABLET, FILM COATED ORAL DAILY
Refills: 0 | Status: DISCONTINUED | OUTPATIENT
Start: 2024-09-04 | End: 2024-09-05

## 2024-09-04 RX ADMIN — PSYLLIUM HUSK 1 PACKET(S): 3.4 GRANULE ORAL at 08:48

## 2024-09-04 RX ADMIN — Medication 1 PATCH: at 19:00

## 2024-09-04 RX ADMIN — ACETAMINOPHEN 975 MILLIGRAM(S): 325 TABLET ORAL at 00:24

## 2024-09-04 RX ADMIN — OXYCODONE HYDROCHLORIDE 10 MILLIGRAM(S): 5 TABLET ORAL at 08:47

## 2024-09-04 RX ADMIN — Medication 1 MILLIGRAM(S): at 08:47

## 2024-09-04 RX ADMIN — ENOXAPARIN SODIUM 30 MILLIGRAM(S): 100 INJECTION SUBCUTANEOUS at 17:40

## 2024-09-04 RX ADMIN — TIOTROPIUM BROMIDE INHALATION SPRAY 2 PUFF(S): 3.12 SPRAY, METERED RESPIRATORY (INHALATION) at 08:51

## 2024-09-04 RX ADMIN — Medication 1 TABLET(S): at 08:48

## 2024-09-04 RX ADMIN — OXYCODONE HYDROCHLORIDE 10 MILLIGRAM(S): 5 TABLET ORAL at 15:58

## 2024-09-04 RX ADMIN — Medication 1 PATCH: at 12:10

## 2024-09-04 RX ADMIN — OXYCODONE HYDROCHLORIDE 10 MILLIGRAM(S): 5 TABLET ORAL at 23:22

## 2024-09-04 RX ADMIN — ENOXAPARIN SODIUM 30 MILLIGRAM(S): 100 INJECTION SUBCUTANEOUS at 05:35

## 2024-09-04 RX ADMIN — LEVETIRACETAM 500 MILLIGRAM(S): 1000 TABLET ORAL at 17:40

## 2024-09-04 RX ADMIN — Medication 750 MILLILITER(S): at 14:28

## 2024-09-04 RX ADMIN — OXYCODONE HYDROCHLORIDE 10 MILLIGRAM(S): 5 TABLET ORAL at 08:00

## 2024-09-04 RX ADMIN — ACETAMINOPHEN 975 MILLIGRAM(S): 325 TABLET ORAL at 01:24

## 2024-09-04 RX ADMIN — OXYCODONE HYDROCHLORIDE 10 MILLIGRAM(S): 5 TABLET ORAL at 22:22

## 2024-09-04 RX ADMIN — LEVETIRACETAM 500 MILLIGRAM(S): 1000 TABLET ORAL at 05:36

## 2024-09-04 RX ADMIN — OXYCODONE HYDROCHLORIDE 10 MILLIGRAM(S): 5 TABLET ORAL at 17:27

## 2024-09-04 RX ADMIN — Medication 100 MILLIGRAM(S): at 08:48

## 2024-09-04 RX ADMIN — Medication 40 MILLIGRAM(S): at 05:36

## 2024-09-04 RX ADMIN — Medication 1000 MILLIGRAM(S): at 00:20

## 2024-09-04 RX ADMIN — SERTRALINE HYDROCHLORIDE 25 MILLIGRAM(S): 50 TABLET, FILM COATED ORAL at 08:48

## 2024-09-04 NOTE — BH CONSULTATION LIAISON PROGRESS NOTE - CURRENT MEDICATION
MEDICATIONS  (STANDING):  enoxaparin Injectable 30 milliGRAM(s) SubCutaneous every 12 hours  folic acid 1 milliGRAM(s) Oral daily  levETIRAcetam   Injectable 500 milliGRAM(s) IV Push every 12 hours  multivitamin/minerals 1 Tablet(s) Oral daily  pantoprazole    Tablet 40 milliGRAM(s) Oral before breakfast  psyllium Powder 1 Packet(s) Oral daily  sertraline 25 milliGRAM(s) Oral daily  thiamine 100 milliGRAM(s) Oral daily  tiotropium 2.5 MICROgram(s) Inhaler 2 Puff(s) Inhalation daily    MEDICATIONS  (PRN):  acetaminophen     Tablet .. 975 milliGRAM(s) Oral every 6 hours PRN Mild Pain (1 - 3)  aluminum hydroxide/magnesium hydroxide/simethicone Suspension 30 milliLiter(s) Oral every 6 hours PRN Dyspepsia  hydrOXYzine hydrochloride 25 milliGRAM(s) Oral every 6 hours PRN Anxiety  naloxone 1 mG/mL Injection for Intranasal Use 5 milliGRAM(s) IntraNasal once PRN overdose  oxyCODONE    IR 5 milliGRAM(s) Oral every 6 hours PRN Moderate Pain (4 - 6)  oxyCODONE    IR 10 milliGRAM(s) Oral every 6 hours PRN Severe Pain (7 - 10)  simethicone 80 milliGRAM(s) Chew every 6 hours PRN Gas
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
## 2024-09-04 NOTE — CHART NOTE - NSCHARTNOTEFT_GEN_A_CORE
Was called to bedside regarding patient's hypotension.     Patient evaluated at beside, was resting comfortably in bed. No acute distress, mentating well.    Vital Signs Last 24 Hrs  T(C): 36.4 (04 Sep 2024 12:52), Max: 37.6 (03 Sep 2024 19:30)  T(F): 97.6 (04 Sep 2024 12:52), Max: 99.7 (03 Sep 2024 19:30)  HR: 88 (04 Sep 2024 13:45) (78 - 98)  BP: 92/60 (04 Sep 2024 13:45) (91/60 - 117/77)  BP(mean): 71 (04 Sep 2024 13:45) (71 - 71)  RR: 17 (04 Sep 2024 12:52) (17 - 18)  SpO2: 94% (04 Sep 2024 12:52) (94% - 100%)  Parameters below as of 04 Sep 2024 12:52  Patient On (Oxygen Delivery Method): room air    Physical Exam:  Gen - well appearing  Resp - normal work of breathing  Neuro - alert and oriented  Psych - normal mood and affect    Will give patient bolus of fluids and re-assess CBC.   - Patient has denoted minimal PO intake of fluids, and has requested for IV fluids as well  - Denies dysphagia, nausea, or vomiting

## 2024-09-04 NOTE — DIETITIAN INITIAL EVALUATION ADULT - MALNUTRITION
Pt is AOx4, no complains of pain, tolerated all oral medications, skin and sacral assessment done, on RA, IS at bedside, able to perform correctly and effectively, 2250mL, call light in use, bed alarm on, treaded socks on, bed locked in low position, plan of care discussed, all needs met at this time. Still pending sputum sample.         mod

## 2024-09-04 NOTE — BH CONSULTATION LIAISON PROGRESS NOTE - NSBHCHARTREVIEWVS_PSY_A_CORE FT
Vital Signs Last 24 Hrs  T(C): 37.2 (04 Sep 2024 09:41), Max: 37.6 (03 Sep 2024 19:30)  T(F): 98.9 (04 Sep 2024 09:41), Max: 99.7 (03 Sep 2024 19:30)  HR: 95 (04 Sep 2024 09:41) (78 - 98)  BP: 102/68 (04 Sep 2024 09:41) (88/53 - 117/77)  BP(mean): --  RR: 17 (04 Sep 2024 09:41) (17 - 18)  SpO2: 98% (04 Sep 2024 09:41) (94% - 100%)    Parameters below as of 04 Sep 2024 09:41  Patient On (Oxygen Delivery Method): room air

## 2024-09-04 NOTE — BH CONSULTATION LIAISON PROGRESS NOTE - NSBHATTESTATTENDBILLTIME_PSY_A_CORE
I attest my time as attending is greater than ELENI time spent on qualifying patient care activities.
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
## 2024-09-04 NOTE — DIETITIAN INITIAL EVALUATION ADULT - PERTINENT MEDS FT
MEDICATIONS  (STANDING):  cholecalciferol 2000 Unit(s) Oral daily  cyanocobalamin 1000 MICROGram(s) Oral daily  enoxaparin Injectable 30 milliGRAM(s) SubCutaneous every 12 hours  folic acid 1 milliGRAM(s) Oral daily  levETIRAcetam   Injectable 500 milliGRAM(s) IV Push every 12 hours  lidocaine   4% Patch 1 Patch Transdermal every 24 hours  multivitamin/minerals 1 Tablet(s) Oral daily  pantoprazole    Tablet 40 milliGRAM(s) Oral before breakfast  psyllium Powder 1 Packet(s) Oral daily  sertraline 25 milliGRAM(s) Oral daily  thiamine 100 milliGRAM(s) Oral daily  tiotropium 2.5 MICROgram(s) Inhaler 2 Puff(s) Inhalation daily    MEDICATIONS  (PRN):  acetaminophen     Tablet .. 975 milliGRAM(s) Oral every 6 hours PRN Mild Pain (1 - 3)  aluminum hydroxide/magnesium hydroxide/simethicone Suspension 30 milliLiter(s) Oral every 6 hours PRN Dyspepsia  hydrOXYzine hydrochloride 25 milliGRAM(s) Oral every 6 hours PRN Anxiety  naloxone 1 mG/mL Injection for Intranasal Use 5 milliGRAM(s) IntraNasal once PRN overdose  oxyCODONE    IR 5 milliGRAM(s) Oral every 6 hours PRN Moderate Pain (4 - 6)  oxyCODONE    IR 10 milliGRAM(s) Oral every 6 hours PRN Severe Pain (7 - 10)  simethicone 80 milliGRAM(s) Chew every 6 hours PRN Gas

## 2024-09-04 NOTE — DIETITIAN INITIAL EVALUATION ADULT - NSFNSGIIOFT_GEN_A_CORE
09-03-24 @ 07:01  -  09-04-24 @ 07:00  --------------------------------------------------------  OUT:    Stool (mL): 1 mL  Total OUT: 1 mL    Total NET: -1 mL
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
## 2024-09-04 NOTE — DIETITIAN INITIAL EVALUATION ADULT - NS FNS DIET ORDER
Diet, Regular:   Supplement Feeding Modality:  Oral  Ensure Pudding Cans or Servings Per Day:  3       Frequency:  Three Times a day  Ensure Plus High Protein Cans or Servings Per Day:  3       Frequency:  Three Times a day (08-31-24 @ 07:10)

## 2024-09-04 NOTE — DIETITIAN INITIAL EVALUATION ADULT - ORAL INTAKE PTA/DIET HISTORY
Pt reports tolerating meals with good appetite and PO intake currently. PTA pt reports that was eating poorly for 2 months with 13 lb weight loss. Pt receiving and taking ensure supplements well.

## 2024-09-04 NOTE — DIETITIAN NUTRITION RISK NOTIFICATION - TREATMENT: THE FOLLOWING DIET HAS BEEN RECOMMENDED
Diet, Regular:   Supplement Feeding Modality:  Oral  Ensure Pudding Cans or Servings Per Day:  3       Frequency:  Three Times a day  Ensure Plus High Protein Cans or Servings Per Day:  3       Frequency:  Three Times a day (08-31-24 @ 07:10) [Active]

## 2024-09-04 NOTE — CHART NOTE - NSCHARTNOTESELECT_GEN_ALL_CORE
Code sepsis/Rapid Response
Downgrade from SICU/Event Note
Event Note/Event Note
Nutrition Services
SICU/Event Note
Tertiary Survey/Event Note
Transfer/Transfer Note
SICU/Event Note

## 2024-09-04 NOTE — DIETITIAN INITIAL EVALUATION ADULT - PERTINENT LABORATORY DATA
09-04    138  |  101  |  6.7<L>  ----------------------------<  82  3.9   |  27.0  |  0.55    Ca    8.8      04 Sep 2024 04:44  Phos  3.8     09-04  Mg     2.0     09-04    A1C with Estimated Average Glucose Result: 5.0 % (01-26-24 @ 06:53)   Nathaly

## 2024-09-04 NOTE — BH SAFETY PLAN - STEP 3 DISTRACTION NAME
Faxed new phlebotomy request form to lab.   
Please let him know that his ferritin is in the range where I want it at this point. Congratulations!!!  I need to change his phlebotomy schedule so that it will be done 3 months from now.  Also let him know that his stomach biopsies did not show an infection.  It shows a noncancerous change in the lining of his stomach.  I will review this with him when he comes to see me in the office visit scheduled for December.  There is nothing he needs to do otherwise in the meantime.    Dorothea Sales MD       
Pt returned my call and I spoke to him re: results-he VU and was happy to hear the good news about phlebotomies. I called Aleta in the lab and gave her a heads up as pt will need rescheduled (He has already scheduled an appt for Oct). I will get Dr. Sales to sign new form and fax to them later this afternoon. Pt advised to call me back with any further questions/problems, otherwise he will keep his f/u with Dr. Sales in Dec as scheduled.   
Tried to call pt re: results-was unable to reach him and no VM set up. I will try him again later if he doesn't call me back.   
.

## 2024-09-05 ENCOUNTER — TRANSCRIPTION ENCOUNTER (OUTPATIENT)
Age: 59
End: 2024-09-05

## 2024-09-05 ENCOUNTER — INPATIENT (INPATIENT)
Facility: HOSPITAL | Age: 59
LOS: 5 days | Discharge: NURSING, REHAB OR LONG-TERM CARE | DRG: 84 | End: 2024-09-11
Attending: PHYSICAL MEDICINE & REHABILITATION | Admitting: PHYSICAL MEDICINE & REHABILITATION
Payer: MEDICAID

## 2024-09-05 VITALS
HEART RATE: 86 BPM | RESPIRATION RATE: 18 BRPM | OXYGEN SATURATION: 98 % | TEMPERATURE: 99 F | SYSTOLIC BLOOD PRESSURE: 103 MMHG | DIASTOLIC BLOOD PRESSURE: 67 MMHG

## 2024-09-05 VITALS
HEIGHT: 63 IN | HEART RATE: 99 BPM | SYSTOLIC BLOOD PRESSURE: 126 MMHG | TEMPERATURE: 98 F | WEIGHT: 118.39 LBS | DIASTOLIC BLOOD PRESSURE: 80 MMHG | OXYGEN SATURATION: 97 % | RESPIRATION RATE: 16 BRPM

## 2024-09-05 DIAGNOSIS — Z98.891 HISTORY OF UTERINE SCAR FROM PREVIOUS SURGERY: Chronic | ICD-10-CM

## 2024-09-05 DIAGNOSIS — D17.9 BENIGN LIPOMATOUS NEOPLASM, UNSPECIFIED: Chronic | ICD-10-CM

## 2024-09-05 DIAGNOSIS — I63.9 CEREBRAL INFARCTION, UNSPECIFIED: ICD-10-CM

## 2024-09-05 LAB
HCT VFR BLD CALC: 24.4 % — LOW (ref 34.5–45)
HGB BLD-MCNC: 7.8 G/DL — LOW (ref 11.5–15.5)
MCHC RBC-ENTMCNC: 32 GM/DL — SIGNIFICANT CHANGE UP (ref 32–36)
MCHC RBC-ENTMCNC: 34.5 PG — HIGH (ref 27–34)
MCV RBC AUTO: 108 FL — HIGH (ref 80–100)
PLATELET # BLD AUTO: 399 K/UL — SIGNIFICANT CHANGE UP (ref 150–400)
RBC # BLD: 2.26 M/UL — LOW (ref 3.8–5.2)
RBC # FLD: 14.4 % — SIGNIFICANT CHANGE UP (ref 10.3–14.5)
SARS-COV-2 RNA SPEC QL NAA+PROBE: SIGNIFICANT CHANGE UP
WBC # BLD: 5.01 K/UL — SIGNIFICANT CHANGE UP (ref 3.8–10.5)
WBC # FLD AUTO: 5.01 K/UL — SIGNIFICANT CHANGE UP (ref 3.8–10.5)

## 2024-09-05 PROCEDURE — 84484 ASSAY OF TROPONIN QUANT: CPT

## 2024-09-05 PROCEDURE — 72170 X-RAY EXAM OF PELVIS: CPT

## 2024-09-05 PROCEDURE — 84300 ASSAY OF URINE SODIUM: CPT

## 2024-09-05 PROCEDURE — 83690 ASSAY OF LIPASE: CPT

## 2024-09-05 PROCEDURE — 93306 TTE W/DOPPLER COMPLETE: CPT

## 2024-09-05 PROCEDURE — 84460 ALANINE AMINO (ALT) (SGPT): CPT

## 2024-09-05 PROCEDURE — 85027 COMPLETE CBC AUTOMATED: CPT

## 2024-09-05 PROCEDURE — 93005 ELECTROCARDIOGRAM TRACING: CPT

## 2024-09-05 PROCEDURE — 83735 ASSAY OF MAGNESIUM: CPT

## 2024-09-05 PROCEDURE — 71045 X-RAY EXAM CHEST 1 VIEW: CPT

## 2024-09-05 PROCEDURE — 81001 URINALYSIS AUTO W/SCOPE: CPT

## 2024-09-05 PROCEDURE — 83935 ASSAY OF URINE OSMOLALITY: CPT

## 2024-09-05 PROCEDURE — 84478 ASSAY OF TRIGLYCERIDES: CPT

## 2024-09-05 PROCEDURE — 82962 GLUCOSE BLOOD TEST: CPT

## 2024-09-05 PROCEDURE — 83605 ASSAY OF LACTIC ACID: CPT

## 2024-09-05 PROCEDURE — 87186 SC STD MICRODIL/AGAR DIL: CPT

## 2024-09-05 PROCEDURE — 83930 ASSAY OF BLOOD OSMOLALITY: CPT

## 2024-09-05 PROCEDURE — 97116 GAIT TRAINING THERAPY: CPT

## 2024-09-05 PROCEDURE — 84145 PROCALCITONIN (PCT): CPT

## 2024-09-05 PROCEDURE — 87086 URINE CULTURE/COLONY COUNT: CPT

## 2024-09-05 PROCEDURE — 87641 MR-STAPH DNA AMP PROBE: CPT

## 2024-09-05 PROCEDURE — 97530 THERAPEUTIC ACTIVITIES: CPT

## 2024-09-05 PROCEDURE — 97163 PT EVAL HIGH COMPLEX 45 MIN: CPT

## 2024-09-05 PROCEDURE — 87640 STAPH A DNA AMP PROBE: CPT

## 2024-09-05 PROCEDURE — 86900 BLOOD TYPING SEROLOGIC ABO: CPT

## 2024-09-05 PROCEDURE — 80053 COMPREHEN METABOLIC PANEL: CPT

## 2024-09-05 PROCEDURE — 86850 RBC ANTIBODY SCREEN: CPT

## 2024-09-05 PROCEDURE — 85018 HEMOGLOBIN: CPT

## 2024-09-05 PROCEDURE — 86901 BLOOD TYPING SEROLOGIC RH(D): CPT

## 2024-09-05 PROCEDURE — 87040 BLOOD CULTURE FOR BACTERIA: CPT

## 2024-09-05 PROCEDURE — 99231 SBSQ HOSP IP/OBS SF/LOW 25: CPT

## 2024-09-05 PROCEDURE — 80076 HEPATIC FUNCTION PANEL: CPT

## 2024-09-05 PROCEDURE — 76705 ECHO EXAM OF ABDOMEN: CPT

## 2024-09-05 PROCEDURE — 80048 BASIC METABOLIC PNL TOTAL CA: CPT

## 2024-09-05 PROCEDURE — 84100 ASSAY OF PHOSPHORUS: CPT

## 2024-09-05 PROCEDURE — 82436 ASSAY OF URINE CHLORIDE: CPT

## 2024-09-05 PROCEDURE — 71260 CT THORAX DX C+: CPT | Mod: MC

## 2024-09-05 PROCEDURE — 85610 PROTHROMBIN TIME: CPT

## 2024-09-05 PROCEDURE — 36415 COLL VENOUS BLD VENIPUNCTURE: CPT

## 2024-09-05 PROCEDURE — 85730 THROMBOPLASTIN TIME PARTIAL: CPT

## 2024-09-05 PROCEDURE — 85014 HEMATOCRIT: CPT

## 2024-09-05 PROCEDURE — 81003 URINALYSIS AUTO W/O SCOPE: CPT

## 2024-09-05 PROCEDURE — 74177 CT ABD & PELVIS W/CONTRAST: CPT | Mod: MC

## 2024-09-05 PROCEDURE — 85025 COMPLETE CBC W/AUTO DIFF WBC: CPT

## 2024-09-05 PROCEDURE — 84450 TRANSFERASE (AST) (SGOT): CPT

## 2024-09-05 PROCEDURE — 94640 AIRWAY INHALATION TREATMENT: CPT

## 2024-09-05 PROCEDURE — 99233 SBSQ HOSP IP/OBS HIGH 50: CPT

## 2024-09-05 PROCEDURE — 70450 CT HEAD/BRAIN W/O DYE: CPT | Mod: MC

## 2024-09-05 RX ORDER — TIOTROPIUM BROMIDE INHALATION SPRAY 3.12 UG/1
2 SPRAY, METERED RESPIRATORY (INHALATION) DAILY
Refills: 0 | Status: DISCONTINUED | OUTPATIENT
Start: 2024-09-06 | End: 2024-09-11

## 2024-09-05 RX ORDER — DILTIAZEM HYDROCHLORIDE 5 MG/ML
120 INJECTION INTRAVENOUS DAILY
Refills: 0 | Status: DISCONTINUED | OUTPATIENT
Start: 2024-09-06 | End: 2024-09-11

## 2024-09-05 RX ORDER — THIAMINE HCL 250 MG
1 TABLET ORAL
Qty: 0 | Refills: 0 | DISCHARGE
Start: 2024-09-05

## 2024-09-05 RX ORDER — SERTRALINE HYDROCHLORIDE 50 MG/1
25 TABLET, FILM COATED ORAL DAILY
Refills: 0 | Status: DISCONTINUED | OUTPATIENT
Start: 2024-09-06 | End: 2024-09-08

## 2024-09-05 RX ORDER — DIAZEPAM 10 MG
5 TABLET ORAL DAILY
Refills: 0 | Status: DISCONTINUED | OUTPATIENT
Start: 2024-09-05 | End: 2024-09-07

## 2024-09-05 RX ORDER — OXYCODONE HYDROCHLORIDE 5 MG/1
10 TABLET ORAL EVERY 6 HOURS
Refills: 0 | Status: DISCONTINUED | OUTPATIENT
Start: 2024-09-05 | End: 2024-09-11

## 2024-09-05 RX ORDER — MAGNESIUM, ALUMINUM HYDROXIDE 200-225/5
30 SUSPENSION, ORAL (FINAL DOSE FORM) ORAL EVERY 6 HOURS
Refills: 0 | Status: DISCONTINUED | OUTPATIENT
Start: 2024-09-05 | End: 2024-09-11

## 2024-09-05 RX ORDER — CLONAZEPAM 1 MG
0.5 TABLET ORAL DAILY
Refills: 0 | Status: DISCONTINUED | OUTPATIENT
Start: 2024-09-06 | End: 2024-09-11

## 2024-09-05 RX ORDER — ACETAMINOPHEN 325 MG/1
650 TABLET ORAL EVERY 8 HOURS
Refills: 0 | Status: DISCONTINUED | OUTPATIENT
Start: 2024-09-05 | End: 2024-09-11

## 2024-09-05 RX ORDER — THIAMINE HCL 250 MG
100 TABLET ORAL DAILY
Refills: 0 | Status: DISCONTINUED | OUTPATIENT
Start: 2024-09-06 | End: 2024-09-11

## 2024-09-05 RX ORDER — POLYETHYLENE GLYCOL 3350 17 G/17G
17 POWDER, FOR SOLUTION ORAL DAILY
Refills: 0 | Status: DISCONTINUED | OUTPATIENT
Start: 2024-09-06 | End: 2024-09-11

## 2024-09-05 RX ORDER — CARVEDILOL 6.25 MG/1
6.25 TABLET ORAL EVERY 12 HOURS
Refills: 0 | Status: DISCONTINUED | OUTPATIENT
Start: 2024-09-05 | End: 2024-09-09

## 2024-09-05 RX ORDER — ASPIRIN 81 MG
81 TABLET, DELAYED RELEASE (ENTERIC COATED) ORAL DAILY
Refills: 0 | Status: DISCONTINUED | OUTPATIENT
Start: 2024-09-06 | End: 2024-09-11

## 2024-09-05 RX ORDER — SENNA 187 MG
2 TABLET ORAL AT BEDTIME
Refills: 0 | Status: DISCONTINUED | OUTPATIENT
Start: 2024-09-05 | End: 2024-09-11

## 2024-09-05 RX ORDER — PANTOPRAZOLE SODIUM 40 MG
40 TABLET, DELAYED RELEASE (ENTERIC COATED) ORAL
Refills: 0 | Status: DISCONTINUED | OUTPATIENT
Start: 2024-09-06 | End: 2024-09-11

## 2024-09-05 RX ORDER — FOLIC ACID/MULTIVIT,IRON,MINER 0.4MG-18MG
2000 TABLET,CHEWABLE ORAL
Qty: 0 | Refills: 0 | DISCHARGE
Start: 2024-09-05

## 2024-09-05 RX ORDER — PSYLLIUM HUSK 0.4 G
1 CAPSULE ORAL
Qty: 0 | Refills: 0 | DISCHARGE
Start: 2024-09-05

## 2024-09-05 RX ORDER — CYANOCOBALAMIN (VITAMIN B-12) 500MCG/0.1
1000 GEL (ML) NASAL DAILY
Refills: 0 | Status: DISCONTINUED | OUTPATIENT
Start: 2024-09-06 | End: 2024-09-11

## 2024-09-05 RX ORDER — FOLIC ACID 1 MG
1 TABLET ORAL
Qty: 0 | Refills: 0 | DISCHARGE
Start: 2024-09-05

## 2024-09-05 RX ORDER — GABAPENTIN 100 MG
300 CAPSULE ORAL EVERY 8 HOURS
Refills: 0 | Status: DISCONTINUED | OUTPATIENT
Start: 2024-09-05 | End: 2024-09-08

## 2024-09-05 RX ORDER — ENOXAPARIN SODIUM 100 MG/ML
30 INJECTION SUBCUTANEOUS EVERY 12 HOURS
Refills: 0 | Status: DISCONTINUED | OUTPATIENT
Start: 2024-09-05 | End: 2024-09-11

## 2024-09-05 RX ORDER — PSYLLIUM HUSK 0.4 G
1 CAPSULE ORAL DAILY
Refills: 0 | Status: DISCONTINUED | OUTPATIENT
Start: 2024-09-05 | End: 2024-09-05

## 2024-09-05 RX ORDER — LIDOCAINE/BENZALKONIUM/ALCOHOL
1 SOLUTION, NON-ORAL TOPICAL DAILY
Refills: 0 | Status: DISCONTINUED | OUTPATIENT
Start: 2024-09-06 | End: 2024-09-11

## 2024-09-05 RX ORDER — OXYCODONE HYDROCHLORIDE 5 MG/1
5 TABLET ORAL EVERY 6 HOURS
Refills: 0 | Status: DISCONTINUED | OUTPATIENT
Start: 2024-09-05 | End: 2024-09-06

## 2024-09-05 RX ORDER — FOLIC ACID 1 MG
1 TABLET ORAL DAILY
Refills: 0 | Status: DISCONTINUED | OUTPATIENT
Start: 2024-09-06 | End: 2024-09-11

## 2024-09-05 RX ORDER — SERTRALINE HYDROCHLORIDE 50 MG/1
1 TABLET, FILM COATED ORAL
Qty: 0 | Refills: 0 | DISCHARGE
Start: 2024-09-05

## 2024-09-05 RX ORDER — PSYLLIUM HUSK 3.4 G/6.5G
1 GRANULE ORAL DAILY
Refills: 0 | Status: DISCONTINUED | OUTPATIENT
Start: 2024-09-06 | End: 2024-09-11

## 2024-09-05 RX ORDER — CYCLOBENZAPRINE HCL 10 MG
10 TABLET ORAL DAILY
Refills: 0 | Status: DISCONTINUED | OUTPATIENT
Start: 2024-09-05 | End: 2024-09-11

## 2024-09-05 RX ORDER — CYANOCOBALAMIN (VITAMIN B-12) 500MCG/0.1
1 GEL (ML) NASAL
Qty: 0 | Refills: 0 | DISCHARGE
Start: 2024-09-05

## 2024-09-05 RX ORDER — FOLIC ACID/MULTIVIT,IRON,MINER 0.4MG-18MG
2000 TABLET,CHEWABLE ORAL DAILY
Refills: 0 | Status: DISCONTINUED | OUTPATIENT
Start: 2024-09-06 | End: 2024-09-11

## 2024-09-05 RX ADMIN — OXYCODONE HYDROCHLORIDE 10 MILLIGRAM(S): 5 TABLET ORAL at 17:35

## 2024-09-05 RX ADMIN — ENOXAPARIN SODIUM 30 MILLIGRAM(S): 100 INJECTION SUBCUTANEOUS at 05:12

## 2024-09-05 RX ADMIN — Medication 1 PATCH: at 11:13

## 2024-09-05 RX ADMIN — Medication 1 MILLIGRAM(S): at 11:16

## 2024-09-05 RX ADMIN — Medication 100 MILLIGRAM(S): at 11:18

## 2024-09-05 RX ADMIN — OXYCODONE HYDROCHLORIDE 10 MILLIGRAM(S): 5 TABLET ORAL at 06:19

## 2024-09-05 RX ADMIN — Medication 1 PATCH: at 00:00

## 2024-09-05 RX ADMIN — Medication 1 TABLET(S): at 11:15

## 2024-09-05 RX ADMIN — Medication 40 MILLIGRAM(S): at 05:12

## 2024-09-05 RX ADMIN — Medication 1000 MICROGRAM(S): at 11:14

## 2024-09-05 RX ADMIN — TIOTROPIUM BROMIDE INHALATION SPRAY 2 PUFF(S): 3.12 SPRAY, METERED RESPIRATORY (INHALATION) at 11:15

## 2024-09-05 RX ADMIN — OXYCODONE HYDROCHLORIDE 10 MILLIGRAM(S): 5 TABLET ORAL at 11:19

## 2024-09-05 RX ADMIN — OXYCODONE HYDROCHLORIDE 10 MILLIGRAM(S): 5 TABLET ORAL at 16:46

## 2024-09-05 RX ADMIN — Medication 2000 UNIT(S): at 11:14

## 2024-09-05 RX ADMIN — OXYCODONE HYDROCHLORIDE 10 MILLIGRAM(S): 5 TABLET ORAL at 05:19

## 2024-09-05 RX ADMIN — ENOXAPARIN SODIUM 30 MILLIGRAM(S): 100 INJECTION SUBCUTANEOUS at 18:39

## 2024-09-05 NOTE — DISCHARGE NOTE NURSING/CASE MANAGEMENT/SOCIAL WORK - PATIENT PORTAL LINK FT
You can access the FollowMyHealth Patient Portal offered by [facility] by registering at the following website: [email or web address]. By joining [facility]’s FollowMyHealth portal, you will also be able to view your health information using other applications (apps) compatible with our system.
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
## 2024-09-05 NOTE — H&P ADULT - NSHPLABSRESULTS_GEN_ALL_CORE
LABS:                        7.8    5.01  )-----------( 399      ( 05 Sep 2024 04:41 )             24.4     09-04    138  |  101  |  6.7<L>  ----------------------------<  82  3.9   |  27.0  |  0.55    Ca    8.8      04 Sep 2024 04:44  Phos  3.8     09-04  Mg     2.0     09-04    Urinalysis Basic - ( 04 Sep 2024 04:44 )    Color: x / Appearance: x / SG: x / pH: x  Gluc: 82 mg/dL / Ketone: x  / Bili: x / Urobili: x   Blood: x / Protein: x / Nitrite: x   Leuk Esterase: x / RBC: x / WBC x   Sq Epi: x / Non Sq Epi: x / Bacteria: x      IMAGING/RADIOLOGY:  CT CHEST/CT ABDOMEN & PELVIS (9/4) IMPRESSION:  No acute findings in the chest.  Moderate simple density fluid in the pelvis, more than is typical for physiologic changes.  Urinary bladder wall thickening may be secondary to underdistention or   cystitis.  No other acute finding in the abdomen or pelvis.    RUQ US (8/29) IMPRESSION:  Hepatic steatosis, stable.  No evidence of cholelithiasis or cholecystitis. No biliary ductal   dilatation identified.    CT HEAD (8/29) IMPRESSION:  Stable appearance of subarachnoid hemorrhage as compared to the prior   exam from approximately 7 hours ago. No new sites of hemorrhage or new   mass effect identified.    PELVIS XRAY (8/28) IMPRESSION:  Markedly distended urinary bladder.      CARDIOLOGY:  TTE (8/29)   CONCLUSIONS:   1. Left ventricular cavity is normal in size. Left ventricular wall thickness is normal. Abnormal (paradoxical) septal motion consistent with conduction delay. Left ventricular systolic function is normal with an ejection fraction visually estimated at 60 to 65 %.   2. Normal left ventricular diastolic function.   3. Normal right ventricular systolic function.   4. Left atrium is normal in size.   5. The right atrium is normal in size.   6. Aortic valve anatomy cannot be determined with normal systolic excursion.   7. Thickened mitral valve leaflets.   8. Mild mitral valve leaflet calcification.   9. There is mild calcification of the mitral valve annulus.  10. Estimated pulmonary artery systolic pressure is 21 mmHg, normal pulmonary artery pressure.

## 2024-09-05 NOTE — PROGRESS NOTE ADULT - NS ATTEND OPT1 GEN_ALL_CORE
Discussed with Ciarra, appt needed.  Chest xray prior along with vit d testing.    appt 4/13 at 1:30 pm  
Patient's grandmother would like to talk to nurse about Kareen's chest congestion, and cough.  Please call back.  
Pt grandmother is phoned.    Pt gets on phone.  Pt has had chest congestion, and productive cough for years, pt is 18 y/o and she states this started in 6 th grade.  Pt concerned she has signs of lung cancer.  Pt lives in household with a smoker.    Will discuss with Ciarra and call pt back    
I attest my time as attending is greater than 50% of the total combined time spent on qualifying patient care activities by the PA/NP and attending.

## 2024-09-05 NOTE — H&P ADULT - NSHPSOCIALHISTORY_GEN_ALL_CORE
SOCIAL HISTORY  Smoking - Denied  EtOH - Drinks 4+ times per week; 10+ drinks at a time   Drugs - Denied    FUNCTIONAL HISTORY  Lives in a ______ with ______  and has __ stairs to enter + hand rails and _____ stairs inside + hand rails. Assissted device to walk ______.  Prior Level of Function: Independent in ADLs and ambulation    CURRENT FUNCTIONAL STATUS  - Bed Mobility:  - Transfers:   - Gait:  - ADLs: SOCIAL HISTORY  Smoking - Denied  EtOH - Drinks 4+ times per week; 10+ drinks at a time   Drugs - Denied    FUNCTIONAL HISTORY  Pt reports that she lives alone in an apartment with 6 JACQUIE with 0 HR.  Prior Level of Function: Independent in ADLs and ambulation    CURRENT FUNCTIONAL STATUS  - Bed Mobility: Supervision  - Transfers: Min A   - Gait: Mod A   - ADLs: Min A SOCIAL HISTORY  Smoking - (+)   EtOH - Drinks 4+ times per week; 10+ drinks at a time   Drugs - Denied    FUNCTIONAL HISTORY  Pt reports that she lives alone in an apartment with 6 JACQUIE with 0 HR.  Prior Level of Function: Independent in ADLs and ambulation    CURRENT FUNCTIONAL STATUS  - Bed Mobility: Supervision  - Transfers: Min A   - Gait: Mod A   - ADLs: Min A
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
## 2024-09-05 NOTE — H&P ADULT - NSHPPHYSICALEXAM_GEN_ALL_CORE
Constitutional - NAD, Comfortable  HEENT - NCAT, EOMI  Neck - Supple, No limited ROM  Chest - good chest expansion, good respiratory effort, CTAB   Cardio - warm and well perfused, RRR, no murmur  Abdomen -  Soft, NTND  Extremities - Edema, bruising of the RLE knee and ankle, No calf tenderness   Neurologic Exam:                    Cognitive -             Orientation: Awake, Alert, AAO to self, place, date, year, situation            Attention:  spelled WORLD backwards, recall 2/3 objects without cuing, 1/3 with cues             Memory: Recent/ remote memory intact            Thought: process, content appropriate     Speech - Fluent, Comprehensible, No dysarthria, No aphasia      Cranial Nerves - No facial asymmetry, Tongue midline, EOMI, Shoulder shrug intact     Motor -                      LEFT    UE - ShAB 5/5, EF 5/5, EE 5/5, WE 5/5,  WNL                    RIGHT UE - ShAB 5/5, EF 5/5, EE 5/5, WE 5/5,  WNL                    LEFT    LE - HF 4/5, KE 5/5, DF 5/5, PF 5/5                    RIGHT LE - HF 4/5, KE 2/5, DF 3/5, PF 3/5        Sensory - Intact to LT bilateral     Reflexes - DTR+2, intact      Coordination - FTN intact     OculoVestibular -  No nystagmus  Psychiatric - Mood stable, Affect WNL  Skin on admission: none Constitutional - NAD, Comfortable  HEENT - NCAT, EOMI, PERRLA  Neck - Supple, No limited ROM  Chest - good chest expansion, good respiratory effort   Cardio - warm and well perfused   Abdomen -  Soft, NT, ND  Extremities - Edema, bruising of the RLE knee and ankle, No calf tenderness   Neurologic Exam:                    Cognitive -             Orientation: Awake, Alert, Oriented x 4, follows command             Attention:  spelled WORLD backwards, recall 2/3 objects without cuing, 1/3 with cues             Memory: Recent/ remote memory intact            Thought: process, content appropriate     Speech - Fluent, Comprehensible, No dysarthria, No aphasia      Cranial Nerves - No facial asymmetry, Tongue midline, EOMI, Shoulder shrug intact     Motor -                      LEFT    UE - ShAB 5/5, EF 5/5, EE 5/5, WE 5/5,  WNL                    RIGHT UE - ShAB 5/5, EF 5/5, EE 5/5, WE 5/5,  WNL                    LEFT    LE - HF 4/5, KE 5/5, DF 5/5, PF 5/5                    RIGHT LE - HF 4/5, KE 2/5, DF 3/5, PF 3/5        Sensory - Intact to LT bilateral     Reflexes - DTR+2, intact      Coordination - FTN intact     OculoVestibular -  No nystagmus  Psychiatric - Mood stable, Affect WNL  Skin on admission: none

## 2024-09-05 NOTE — H&P ADULT - NSHPSOURCEINFORD_GEN_ALL_CORE
Chart(s)/Patient Complex Repair And Ftsg Text: The defect edges were debeveled with a #15 scalpel blade.  The primary defect was closed partially with a complex linear closure.  Given the location of the defect, shape of the defect and the proximity to free margins a full thickness skin graft was deemed most appropriate to repair the remaining defect.  The graft was trimmed to fit the size of the remaining defect.  The graft was then placed in the primary defect, oriented appropriately, and sutured into place.
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
## 2024-09-05 NOTE — PATIENT PROFILE ADULT - FALL HARM RISK - RISK INTERVENTIONS

## 2024-09-05 NOTE — PROGRESS NOTE ADULT - REASON FOR ADMISSION
SAH, Hyponatremia

## 2024-09-05 NOTE — PROGRESS NOTE ADULT - PROVIDER SPECIALTY LIST ADULT
Neurosurgery
Trauma Surgery
Physiatry
SICU
Trauma Surgery
Physiatry
Surgery
Trauma Surgery
Trauma Surgery

## 2024-09-05 NOTE — H&P ADULT - NSHPADDITIONALINFOADULT_GEN_ALL_CORE
Saw and evaluated the patient, took history, reviewed medical records, examined, started an assessment and management plan. Spent 75 minutes excluding teaching time
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
## 2024-09-05 NOTE — PROGRESS NOTE ADULT - SUBJECTIVE AND OBJECTIVE BOX
Patient feels terrible with ongoing headaches and pain in her right shin.     FUNCTIONAL PROGRESS  9/2 PT  Bed Mobility  Bed Mobility Training Sit-to-Supine: supervsion  Bed Mobility Training Supine-to-Sit: supervsion  Bed Mobility Training Limitations: impaired balance;  impaired coordination    Sit-Stand Transfer Training  Transfer Training Sit-to-Stand Transfer: minimum assist (75% patient effort);  1 person assist  Transfer Training Stand-to-Sit Transfer: minimum assist (75% patient effort);  1 person assist  Sit-to-Stand Transfer Training Transfer Safety Analysis: impaired balance;  impaired coordination    Gait Training  Gait Training: minimum assist (75% patient effort);  1 person assist;  rolling walker;  40ft  Gait Analysis: impaired balance;  impaired coordination;  pain    9/2 OT  Bathing Training:     · Level of [place]	minimum assist (75% patients effort)    Upper Body Dressing Training:     · Level of [place]	minimum assist (75% patients effort)    Lower Body Dressing Training:     · Level of [place]	minimum assist (75% patients effort)    Toilet Hygiene Training:     · Level of [place]	minimum assist (75% patients effort)    Grooming Training:     · Level of [place]	minimum assist (75% patients effort)    Eating/Self-Feeding Training:     · Level of [place]	Did not directly observe          VITALS  T(C): 37.2 (09-04-24 @ 09:41), Max: 37.6 (09-03-24 @ 19:30)  HR: 95 (09-04-24 @ 09:41) (78 - 98)  BP: 102/68 (09-04-24 @ 09:41) (88/53 - 117/77)  RR: 17 (09-04-24 @ 09:41) (17 - 18)  SpO2: 98% (09-04-24 @ 09:41) (94% - 100%)  Wt(kg): --    MEDICATIONS   acetaminophen     Tablet .. 975 milliGRAM(s) every 6 hours PRN  aluminum hydroxide/magnesium hydroxide/simethicone Suspension 30 milliLiter(s) every 6 hours PRN  enoxaparin Injectable 30 milliGRAM(s) every 12 hours  folic acid 1 milliGRAM(s) daily  hydrOXYzine hydrochloride 25 milliGRAM(s) every 6 hours PRN  levETIRAcetam   Injectable 500 milliGRAM(s) every 12 hours  multivitamin/minerals 1 Tablet(s) daily  naloxone 1 mG/mL Injection for Intranasal Use 5 milliGRAM(s) once PRN  oxyCODONE    IR 5 milliGRAM(s) every 6 hours PRN  oxyCODONE    IR 10 milliGRAM(s) every 6 hours PRN  pantoprazole    Tablet 40 milliGRAM(s) before breakfast  psyllium Powder 1 Packet(s) daily  sertraline 25 milliGRAM(s) daily  simethicone 80 milliGRAM(s) every 6 hours PRN  thiamine 100 milliGRAM(s) daily  tiotropium 2.5 MICROgram(s) Inhaler 2 Puff(s) daily      RECENT LABS/IMAGING  - Reviewed Today                        7.9    4.93  )-----------( 281      ( 04 Sep 2024 04:44 )             24.5     09-04    138  |  101  |  6.7<L>  ----------------------------<  82  3.9   |  27.0  |  0.55    Ca    8.8      04 Sep 2024 04:44  Phos  3.8     09-04  Mg     2.0     09-04        Urinalysis Basic - ( 04 Sep 2024 04:44 )    Color: x / Appearance: x / SG: x / pH: x  Gluc: 82 mg/dL / Ketone: x  / Bili: x / Urobili: x   Blood: x / Protein: x / Nitrite: x   Leuk Esterase: x / RBC: x / WBC x   Sq Epi: x / Non Sq Epi: x / Bacteria: x          HEAD CT 8/29 - 1.  No significant change, without interval rebleeding.    CT CAP 9/4 - No acute findings in the chest. Moderate simple density fluid in the pelvis, more than is typical for physiologic changes. Urinary bladder wall thickening may be secondary to underdistention or cystitis.No other acute finding in the abdomen or pelvis.  ----------------------------------------------------------------------------------------  PHYSICAL EXAM  Constitutional - NAD, Uncomfortable   Extremities - Right LE shin ecchymosis  Neurologic Exam -                    Cognitive - AAOx4     Communication - Fluent, No dysarthria     Cranial Nerves - CN 2-12 intact     Motor - No focal deficits  Psychiatric - Distressed  ----------------------------------------------------------------------------------------  ASSESSMENT/PLAN  59yFemale with functional deficits after a fall sustaining trauma  Traumatic SAH - Stable, Keppra  Anxiety D/O - Atarax, Zoloft  Pain - RECOMMEND LIDODERM to right shin, Oxycodone  Post-Traumatic Heacaches - CONSIDER TRIAL Nortriptyline 10mg HS  DVT PPX - SCDs, Lovenox  Rehab/Impaired mobility and function - Patient continues to require hospitalization for the above diagnoses and ongoing active management of comorbid complications  that are substantially posing a threat to bodily function, functional ability and quality of life, necessitating transfer of hospitalization at an acute inpatient rehabilitation facility.     When medically optimized, based on the patient's diagnosis, current functional status and potential for progress, recommend ACUTE inpatient rehabilitation for the functional deficits consisting of 3 hours of therapy/day & 24 hour RN/daily PMR physician for active comorbid medical management. Patient will be able to tolerate 3 hours a day.    Will continue to follow. Rehab recommendations are dependent on how functional progress changes as well as how patient continues to participate and tolerate therapeutic interventions, WHICH MAY CHANGE UPON FOLLOW UP EVALUATIONS. Recommend ongoing mobilization by staff to maintain cardiopulmonary function and prevention of secondary complications related to debility. Discussed the specific rehabilitation management and recommendations above with rehab clinical care team/rehab liaison.      Total Time Spent on Encounter (reviewing clinical notes, labs, radiology, medications, patient history/exam, assessment and plan) - 50 minutes   
SICU Daily Progress Note  =====================================================  Interval/Overnight Events:   Ms. Dougherty is experiencing headache's and epigastric abdominal pain that improved throughout the day after pantoprazole administration.        PMH:    PAST MEDICAL & SURGICAL HISTORY:  High cholesterol      CA skin, basal cell      Spasmodic dysphonia      CAD (coronary artery disease)      Asthma      H/O  section  x 3      Multiple lipomas          Allergies: rubber (Short breath; Swelling)  cephalosporins (Hives; Diarrhea)  latex (Hives)  penicillin (Hives; Diarrhea)      MEDICATIONS:   --------------------------------------------------------------------------------------  Neurologic Medications  gabapentin 300 milliGRAM(s) Oral every 8 hours  levETIRAcetam   Injectable 500 milliGRAM(s) IV Push every 12 hours    Respiratory Medications  tiotropium 2.5 MICROgram(s) Inhaler 2 Puff(s) Inhalation daily    Cardiovascular Medications    Gastrointestinal Medications  aluminum hydroxide/magnesium hydroxide/simethicone Suspension 30 milliLiter(s) Oral every 6 hours PRN Dyspepsia  multiple electrolytes Injection Type 1 1000 milliLiter(s) IV Continuous <Continuous>  multivitamin 1 Tablet(s) Oral daily  pantoprazole    Tablet 40 milliGRAM(s) Oral before breakfast  potassium phosphate / sodium phosphate Tablet (K-PHOS No. 2) 2 Tablet(s) Oral every 6 hours  thiamine IVPB 500 milliGRAM(s) IV Intermittent daily    Genitourinary Medications  tamsulosin 0.4 milliGRAM(s) Oral at bedtime    Hematologic/Oncologic Medications    Antimicrobial/Immunologic Medications    Endocrine/Metabolic Medications    Topical/Other Medications  chlorhexidine 2% Cloths 1 Application(s) Topical daily    --------------------------------------------------------------------------------------    VITAL SIGNS, INS/OUTS (last 24 hours):  --------------------------------------------------------------------------------------  ((Insert SICU Vitals/Is+Os here))***  --------------------------------------------------------------------------------------    EXAM  NEUROLOGY  RASS: 0  	GCS:  15  Exam: NAD, alert, oriented x4, no focal deficits. Depressed affect.    HEENT  Exam: Normocephalic, atraumatic, EOMI.     RESPIRATORY  Exam: Normal expansion/effort  Mechanical Ventilation: none    CARDIOVASCULAR  Exam: Regular rate and rhythm    GI/NUTRITION  Exam: Abdomen soft, Non-tender, Non-distended.  Current Diet:  Regular    VASCULAR  Exam: Extremities warm, pink, well-perfused.    MUSCULOSKELETAL  Exam: All extremities moving spontaneously without limitations. Left shoulder tender to palpation but with full pain free ROM. Ecchymosis on right arm, right hip, and right anterior shin. 5/5 pain free strength when pulling and pushing against the examiner. Pain upon dorsiflexion of right foot. No clubbing or edema.     SKIN  Exam: Ecchymosis on right arm, right hip, and right anterior shin    METABOLIC/FLUIDS/ELECTROLYTES  multiple electrolytes Injection Type 1 1000 milliLiter(s) IV Continuous <Continuous>  multivitamin 1 Tablet(s) Oral daily  potassium phosphate / sodium phosphate Tablet (K-PHOS No. 2) 2 Tablet(s) Oral every 6 hours  thiamine IVPB 500 milliGRAM(s) IV Intermittent daily      HEMATOLOGIC  [x] VTE Prophylaxis: SCDs  Transfusions:	[] PRBC	[] Platelets		[] FFP	[] Cryoprecipitate    INFECTIOUS DISEASE  Antimicrobials/Immunologic Medications:        Tubes/Lines/Drains  ***  [x] Peripheral IV  [] Central Venous Line     	[] R	[] L	[] IJ	[] Fem	[] SC	Date Placed:   [] Arterial Line		[] R	[] L	[] Fem	[] Rad	[] Ax	Date Placed:   [] PICC		[] Midline		[] Mediport  [] Urinary Catheter		Date Placed:   [x] Necessity of urinary, arterial, and venous catheters discussed    LABS  --------------------------------------------------------------------------------------                        9.6    4.13  )-----------( 87       ( 30 Aug 2024 04:00 )             27.2   0830    141  |  106  |  3.4<L>  ----------------------------<  87  3.1<L>   |  21.0<L>  |  0.51    Ca    8.0<L>      30 Aug 2024 04:00  Phos  3.2     0830  Mg     1.8     0830    TPro  7.2  /  Alb  4.2  /  TBili  1.0  /  DBili  0.3  /  AST  74<H>  /  ALT  38<H>  /  AlkPhos  102      --------------------------------------------------------------------------------------    OTHER LABORATORY:     IMAGING STUDIES:   CXR:     
SUBJECTIVE / 24H EVENTS:    Pt seen and examined at bedside by the team during rounds. Pt found to be resting comfortably in bed.  Pt denies CP, SOB, N/V, fever, chills.     MEDICATIONS  (STANDING):  enoxaparin Injectable 30 milliGRAM(s) SubCutaneous every 12 hours  folic acid 1 milliGRAM(s) Oral daily  levETIRAcetam   Injectable 500 milliGRAM(s) IV Push every 12 hours  multivitamin 1 Tablet(s) Oral daily  pantoprazole    Tablet 40 milliGRAM(s) Oral before breakfast  psyllium Powder 1 Packet(s) Oral daily  tamsulosin 0.4 milliGRAM(s) Oral at bedtime  tiotropium 2.5 MICROgram(s) Inhaler 2 Puff(s) Inhalation daily    MEDICATIONS  (PRN):  aluminum hydroxide/magnesium hydroxide/simethicone Suspension 30 milliLiter(s) Oral every 6 hours PRN Dyspepsia  oxyCODONE    IR 5 milliGRAM(s) Oral every 4 hours PRN Severe Pain (7 - 10)  oxyCODONE    IR 2.5 milliGRAM(s) Oral every 4 hours PRN Moderate Pain (4 - 6)      Vital Signs Last 24 Hrs  T(C): 37 (01 Sep 2024 00:29), Max: 37 (31 Aug 2024 11:27)  T(F): 98.6 (01 Sep 2024 00:29), Max: 98.6 (31 Aug 2024 11:27)  HR: 97 (01 Sep 2024 00:29) (81 - 107)  BP: 110/73 (01 Sep 2024 00:29) (80/57 - 114/78)  BP(mean): 77 (31 Aug 2024 16:00) (66 - 93)  RR: 18 (01 Sep 2024 00:29) (11 - 21)  SpO2: 99% (01 Sep 2024 00:29) (97% - 100%)    Parameters below as of 01 Sep 2024 00:29  Patient On (Oxygen Delivery Method): room air      Physical Exam  Constitutional: patient appears comfortable resting in bed, in no apparent distress  Respiratory: respirations are unlabored, no accessory muscle use, no conversational dyspnea  Cardiovascular: regular rate & rhythm  Gastrointestinal: abdomen is soft & non-distended, non-tender, no rebound tenderness / guarding  MSK: all extremities moving spontaneously without limitations. Some cchymosis on right arm, right hip, and right anterior shin      I&O's Detail    30 Aug 2024 07:01  -  31 Aug 2024 07:00  --------------------------------------------------------  IN:    IV PiggyBack: 100 mL    IV PiggyBack: 50 mL    multiple electrolytes Injection Type 1.: 80 mL  Total IN: 230 mL    OUT:    Indwelling Catheter - Urethral (mL): 625 mL    Stool (mL): 4 mL  Total OUT: 629 mL    Total NET: -399 mL          LABS:                        9.2    4.40  )-----------( 85       ( 31 Aug 2024 04:10 )             26.2     08-31    141  |  104  |  <3.0<L>  ----------------------------<  111<H>  3.9   |  27.0  |  0.53    Ca    8.8      31 Aug 2024 12:04  Phos  1.9     08-31  Mg     2.4     08-31    TPro  5.8<L>  /  Alb  3.3  /  TBili  0.4  /  DBili  0.1  /  AST  60<H>  /  ALT  36<H>  /  AlkPhos  91  08-31      Urinalysis Basic - ( 31 Aug 2024 12:04 )    Color: x / Appearance: x / SG: x / pH: x  Gluc: 111 mg/dL / Ketone: x  / Bili: x / Urobili: x   Blood: x / Protein: x / Nitrite: x   Leuk Esterase: x / RBC: x / WBC x   Sq Epi: x / Non Sq Epi: x / Bacteria: x      
SUBJECTIVE / 24H EVENTS:    Pt seen and examined at bedside by the team during rounds. No overnight events or new complaints. Pt denies CP, SOB, N/V, fever, chills.     MEDICATIONS  (STANDING):  cholecalciferol 2000 Unit(s) Oral daily  cyanocobalamin 1000 MICROGram(s) Oral daily  enoxaparin Injectable 30 milliGRAM(s) SubCutaneous every 12 hours  folic acid 1 milliGRAM(s) Oral daily  lidocaine   4% Patch 1 Patch Transdermal every 24 hours  multivitamin/minerals 1 Tablet(s) Oral daily  pantoprazole    Tablet 40 milliGRAM(s) Oral before breakfast  psyllium Powder 1 Packet(s) Oral daily  sertraline 25 milliGRAM(s) Oral daily  thiamine 100 milliGRAM(s) Oral daily  tiotropium 2.5 MICROgram(s) Inhaler 2 Puff(s) Inhalation daily    MEDICATIONS  (PRN):  acetaminophen     Tablet .. 975 milliGRAM(s) Oral every 6 hours PRN Mild Pain (1 - 3)  aluminum hydroxide/magnesium hydroxide/simethicone Suspension 30 milliLiter(s) Oral every 6 hours PRN Dyspepsia  hydrOXYzine hydrochloride 25 milliGRAM(s) Oral every 6 hours PRN Anxiety  naloxone 1 mG/mL Injection for Intranasal Use 5 milliGRAM(s) IntraNasal once PRN overdose  oxyCODONE    IR 5 milliGRAM(s) Oral every 6 hours PRN Moderate Pain (4 - 6)  oxyCODONE    IR 10 milliGRAM(s) Oral every 6 hours PRN Severe Pain (7 - 10)  simethicone 80 milliGRAM(s) Chew every 6 hours PRN Gas      Vital Signs Last 24 Hrs  T(C): 37 (05 Sep 2024 10:54), Max: 37.6 (05 Sep 2024 05:00)  T(F): 98.6 (05 Sep 2024 10:54), Max: 99.6 (05 Sep 2024 05:00)  HR: 86 (05 Sep 2024 10:54) (86 - 96)  BP: 103/67 (05 Sep 2024 10:54) (91/60 - 120/78)  BP(mean): 92 (04 Sep 2024 15:55) (71 - 92)  RR: 18 (05 Sep 2024 10:54) (17 - 18)  SpO2: 98% (05 Sep 2024 10:54) (93% - 98%)    Parameters below as of 05 Sep 2024 10:54  Patient On (Oxygen Delivery Method): room air        Physical Exam  Constitutional: NAD  HEENT: EOMI  Respiratory: respirations are unlabored, no accessory muscle use, no conversational dyspnea  Cardiovascular: regular rate & rhythm  Gastrointestinal: abdomen is soft & non-distended, non-tender, no rebound tenderness / guarding  Neurological: A&O x 3  Musculoskeletal: [place] x 4 spontaneously, extremities are without point tenderness or deformity  Skin: no rash, no diaphoresis, pallor, cyanosis or jaundice      I&O's Detail    04 Sep 2024 07:01  -  05 Sep 2024 07:00  --------------------------------------------------------  IN:    Oral Fluid: 240 mL  Total IN: 240 mL    OUT:  Total OUT: 0 mL    Total NET: 240 mL          LABS:                        7.8    5.01  )-----------( 399      ( 05 Sep 2024 04:41 )             24.4     09-04    138  |  101  |  6.7<L>  ----------------------------<  82  3.9   |  27.0  |  0.55    Ca    8.8      04 Sep 2024 04:44  Phos  3.8     09-04  Mg     2.0     09-04        Urinalysis Basic - ( 04 Sep 2024 04:44 )    Color: x / Appearance: x / SG: x / pH: x  Gluc: 82 mg/dL / Ketone: x  / Bili: x / Urobili: x   Blood: x / Protein: x / Nitrite: x   Leuk Esterase: x / RBC: x / WBC x   Sq Epi: x / Non Sq Epi: x / Bacteria: x      
SUBJECTIVE:   60 yo female with a pmhx of CAD on ASA (last reported dose a month ago), asthma, lipomas, HLD, etoh abuse (reports she drinks about a pint of vodka daily since june) who presents as a transfer from [facility] [place] with complaints of leg pain and a headache. Patient states that over the last week she has had multiple episodes of passing out. Patient states that the she hit her head everyday for the last three days, last head strike today.      INTERVAL HX/OVERNIGHT EVENTS:  Patient seen and examined at bedside. Denies any acute complaints Repeat CTH stable.     Vital Signs Last 24 Hrs  T(C): 36.7 (08-29-24 @ 07:50), Max: 36.8 (08-29-24 @ 04:00)  T(F): 98 (08-29-24 @ 07:50), Max: 98.2 (08-29-24 @ 04:00)  HR: 72 (08-29-24 @ 11:00) (72 - 106)  BP: 98/66 (08-29-24 @ 11:00) (97/66 - 134/91)  BP(mean): 76 (08-29-24 @ 11:00) (76 - 185)  RR: 11 (08-29-24 @ 11:00) (11 - 29)  SpO2: 100% (08-29-24 @ 11:00) (91% - 100%)    PHYSICAL EXAM:    GENERAL: NAD    HEAD: normocephalic   MENTAL STATUS: AAO x 3, conversant, following simple commands    CRANIAL NERVES: PERRL, EOMI without nystagmus. Face symmetric, Tongue is midline. Hearing grossly intact. Head turning and shoulder shrug intact.    REFLEXES: No pronator drift   MOTOR: strength 5/5 b/l upper and lower extremities   SENSATION: grossly intact to light touch all extremities   SKIN: Warm, dry    LABS:                          9.6    4.72  )-----------( 62       ( 29 Aug 2024 01:50 )             26.7    08-29    134<L>  |  103  |  8.3  ----------------------------<  79  3.2<L>   |  20.0<L>  |  0.68    Ca    8.9      29 Aug 2024 01:50  Phos  0.8     08-29  Mg     1.6     08-29    TPro  7.2  /  Alb  4.2  /  TBili  1.0  /  DBili  0.3  /  AST  74<H>  /  ALT  38<H>  /  AlkPhos  102  08-28  PT/INR - ( 28 Aug 2024 20:24 )   PT: 10.8 sec;   INR: 0.97 ratio         PTT - ( 28 Aug 2024 20:24 )  PTT:28.5 sec    08-28 @ 07:01  -  08-29 @ 07:00  --------------------------------------------------------  IN: 1623 mL / OUT: 2310 mL / NET: -687 mL    08-29 @ 07:01  -  08-29 @ 11:29  --------------------------------------------------------  IN: 200 mL / OUT: 350 mL / NET: -150 mL        IMAGING:     CT Head No Cont (08.29.24 @ 00:30)   IMPRESSION:  Stable appearance of subarachnoid hemorrhage as compared to the prior   exam from approximately 7 hours ago. No new sites of hemorrhage or new   mass effect identified.    IMPRESSION:  CTA NECK:  No evidence of significant stenosis or occlusion. No evidence of vessel injury or dissection.  CTA HEAD:  Patent intracranial circulation without flow limiting stenosis.  No evidence of aneurysm. Tiny aneurysms can be beyond the resolution of CTA technique.      
INTERVAL HPI/OVERNIGHT EVENTS:    Pt states feeling better clinically and tolerating diet with bowel function. Pt c/o of continued pain to right anterior shin over hematoma. Pt also states vision slightly "cloudy" without decreased visual acuity or visual field defect. Pt states has ophthalmology appointment later this week.  Pt denies headache, N/V, dizziness/lightheadedness, motor/sensation deficit.       MEDICATIONS  (STANDING):  enoxaparin Injectable 30 milliGRAM(s) SubCutaneous every 12 hours  folic acid 1 milliGRAM(s) Oral daily  levETIRAcetam   Injectable 500 milliGRAM(s) IV Push every 12 hours  lidocaine   4% Patch 1 Patch Transdermal every 24 hours  multivitamin/minerals 1 Tablet(s) Oral daily  pantoprazole    Tablet 40 milliGRAM(s) Oral before breakfast  psyllium Powder 1 Packet(s) Oral daily  sertraline 25 milliGRAM(s) Oral daily  thiamine 100 milliGRAM(s) Oral daily  tiotropium 2.5 MICROgram(s) Inhaler 2 Puff(s) Inhalation daily    MEDICATIONS  (PRN):  acetaminophen     Tablet .. 975 milliGRAM(s) Oral every 6 hours PRN Mild Pain (1 - 3)  aluminum hydroxide/magnesium hydroxide/simethicone Suspension 30 milliLiter(s) Oral every 6 hours PRN Dyspepsia  hydrOXYzine hydrochloride 25 milliGRAM(s) Oral every 6 hours PRN Anxiety  naloxone 1 mG/mL Injection for Intranasal Use 5 milliGRAM(s) IntraNasal once PRN overdose  oxyCODONE    IR 5 milliGRAM(s) Oral every 6 hours PRN Moderate Pain (4 - 6)  oxyCODONE    IR 10 milliGRAM(s) Oral every 6 hours PRN Severe Pain (7 - 10)  simethicone 80 milliGRAM(s) Chew every 6 hours PRN Gas      Vital Signs Last 24 Hrs  T(C): 37.2 (04 Sep 2024 09:41), Max: 37.6 (03 Sep 2024 19:30)  T(F): 98.9 (04 Sep 2024 09:41), Max: 99.7 (03 Sep 2024 19:30)  HR: 95 (04 Sep 2024 09:41) (78 - 98)  BP: 102/68 (04 Sep 2024 09:41) (88/53 - 117/77)  BP(mean): --  RR: 17 (04 Sep 2024 09:41) (17 - 18)  SpO2: 98% (04 Sep 2024 09:41) (94% - 100%)    Parameters below as of 04 Sep 2024 09:41  Patient On (Oxygen Delivery Method): room air        Physical Exam:    Neurological:  GCS 15. No sensory/motor deficits    HEENT: PERRLA, EOMI, no drainage or redness. Visual acuity grossly intact. No visual fields deficit.     Back: Normal spine flexure, No CVA tenderness, No deformity or limitation of movement    Respiratory: Breath Sounds equal & clear to auscultation, no accessory muscle use    Cardiovascular: Regular rate & rhythm, normal S1, S2; no murmurs, gallops or rubs    Gastrointestinal: Soft, non-tender, normal bowel sounds    Vascular: Equal and normal pulses: 2+ peripheral pulses throughout    Musculoskeletal: There is tenderness to anterior shin over hematoma. No erythema. No joint pain, swelling or deformity; no limitation of movement        I&O's Detail    03 Sep 2024 07:01  -  04 Sep 2024 07:00  --------------------------------------------------------  IN:    Oral Fluid: 550 mL  Total IN: 550 mL    OUT:    Stool (mL): 1 mL  Total OUT: 1 mL    Total NET: 549 mL          LABS:                        7.9    4.93  )-----------( 281      ( 04 Sep 2024 04:44 )             24.5     09-04    138  |  101  |  6.7<L>  ----------------------------<  82  3.9   |  27.0  |  0.55    Ca    8.8      04 Sep 2024 04:44  Phos  3.8     09-04  Mg     2.0     09-04        Urinalysis Basic - ( 04 Sep 2024 04:44 )    Color: x / Appearance: x / SG: x / pH: x  Gluc: 82 mg/dL / Ketone: x  / Bili: x / Urobili: x   Blood: x / Protein: x / Nitrite: x   Leuk Esterase: x / RBC: x / WBC x   Sq Epi: x / Non Sq Epi: x / Bacteria: x        RADIOLOGY & ADDITIONAL STUDIES:
Patient feels her shin pain responded with lidoderm patch recommended.  Have provided warm compresses.   BP  may have been low, but patient is asymptomatic. Was sitting up, eating.    FUNCTIONAL PROGRESS  9/4 PT  Bed Mobility  Bed Mobility Training Sit-to-Supine: minimum assist (75% patient effort);  1 person assist;  nonverbal cues (demo/gestures);  verbal cues;  bed rails  Bed Mobility Training Supine-to-Sit: minimum assist (75% patient effort);  1 person assist;  nonverbal cues (demo/gestures);  verbal cues;  bed rails  Bed Mobility Training Limitations: pain;  decreased strength;  decreased ability to use legs for bridging/pushing    Sit-Stand Transfer Training  Transfer Training Sit-to-Stand Transfer: minimum assist (75% patient effort);  1 person assist;  nonverbal cues (demo/gestures);  verbal cues;  full weight-bearing   rolling walker  Transfer Training Stand-to-Sit Transfer: minimum assist (75% patient effort);  1 person assist;  verbal cues;  nonverbal cues (demo/gestures);  full weight-bearing   rolling walker  Sit-to-Stand Transfer Training Transfer Safety Analysis: decreased step length;  decreased weight-shifting ability;  pain;  decreased strength    Gait Training  Gait Training: minimum assist (75% patient effort);  1 person assist;  nonverbal cues (demo/gestures);  verbal cues;  full weight-bearing   rolling walker;  30ft  Gait Analysis: 3-point gait   decreased mendoza;  decreased step length;  pain;  decreased strength;  30ft     9/4 OT  Bed Mobility  Bed Mobility Training Sit-to-Supine: minimum assist (75% patient effort);  1 person assist;  nonverbal cues (demo/gestures);  verbal cues  Bed Mobility Training Supine-to-Sit: minimum assist (75% patient effort);  1 person assist;  nonverbal cues (demo/gestures);  verbal cues  Bed Mobility Training Limitations: dizzy, deconditioned;  decreased strength;  impaired balance;  pain    Sit-Stand Transfer Training  Transfer Training Sit-to-Stand Transfer: minimum assist (75% patient effort);  1 person assist;  nonverbal cues (demo/gestures);  verbal cues;  weight-bearing as tolerated   rolling walker  Transfer Training Stand-to-Sit Transfer: minimum assist (75% patient effort);  1 person assist;  nonverbal cues (demo/gestures);  verbal cues;  weight-bearing as tolerated   rolling walker  Sit-to-Stand Transfer Training Transfer Safety Analysis: decreased weight-shifting ability;  impaired balance;  decreased strength;  pain;  dizzy, deconditioned;  rolling walker    Toilet Transfer Training  Transfer Training Toilet Transfer: minimum assist (75% patient effort);  1 person assist;  nonverbal cues (demo/gestures);  verbal cues;  standard toilet with use of grab bar;  weight-bearing as tolerated   rolling walker;  grab bars  Toilet Transfer Training Transfer Safety Analysis: decreased weight-shifting ability;  impaired balance;  decreased strength;  pain;  dizzy, deconditioned;  grab bars;  rolling walker    Functional Endurance  Functional Endurance Detail: Pt ambulated with RW and min A x 1, +external cues short distances around bed area to/from the bathroom due to decreased balance, decreased endurance and decreased strength. Pt educated in energy conservation techniques including proper breathing and activity pacing.     Lower Body Dressing Training  Lower Body Dressing Training Assistance: minimum assist (75% patient effort);  1 person assist;  nonverbal cues (demo/gestures);  verbal cues;  decreased strength;  impaired balance;  pain    Grooming Training  Grooming Training Assistance: minimum assist (75% patient effort);  1 person assist;  nonverbal cues (demo/gestures);  verbal cues;  standing at sink to wash hands;  RW;  decreased strength;  impaired balance;  pain;  dizzy     Toilet Training  Toilet Training Assistance: minimum assist (75% patient effort);  1 person assist;  nonverbal cues (demo/gestures);  verbal cues;  eyad care, thoroughness and clothing management ;  decreased strength;  impaired balance;  pain;  dizzy, deconditioned      VITALS  T(C): 37.6 (09-05-24 @ 05:00), Max: 37.6 (09-05-24 @ 05:00)  HR: 86 (09-05-24 @ 05:00) (86 - 96)  BP: 101/66 (09-05-24 @ 05:00) (91/60 - 120/78)  RR: 18 (09-05-24 @ 05:00) (17 - 18)  SpO2: 93% (09-05-24 @ 05:00) (93% - 98%)  Wt(kg): --    MEDICATIONS   acetaminophen     Tablet .. 975 milliGRAM(s) every 6 hours PRN  aluminum hydroxide/magnesium hydroxide/simethicone Suspension 30 milliLiter(s) every 6 hours PRN  cholecalciferol 2000 Unit(s) daily  cyanocobalamin 1000 MICROGram(s) daily  enoxaparin Injectable 30 milliGRAM(s) every 12 hours  folic acid 1 milliGRAM(s) daily  hydrOXYzine hydrochloride 25 milliGRAM(s) every 6 hours PRN  lidocaine   4% Patch 1 Patch every 24 hours  multivitamin/minerals 1 Tablet(s) daily  naloxone 1 mG/mL Injection for Intranasal Use 5 milliGRAM(s) once PRN  oxyCODONE    IR 5 milliGRAM(s) every 6 hours PRN  oxyCODONE    IR 10 milliGRAM(s) every 6 hours PRN  pantoprazole    Tablet 40 milliGRAM(s) before breakfast  psyllium Powder 1 Packet(s) daily  sertraline 25 milliGRAM(s) daily  simethicone 80 milliGRAM(s) every 6 hours PRN  thiamine 100 milliGRAM(s) daily  tiotropium 2.5 MICROgram(s) Inhaler 2 Puff(s) daily      RECENT LABS/IMAGING  - Reviewed Today                        7.8    5.01  )-----------( 399      ( 05 Sep 2024 04:41 )             24.4     09-04    138  |  101  |  6.7<L>  ----------------------------<  82  3.9   |  27.0  |  0.55    Ca    8.8      04 Sep 2024 04:44  Phos  3.8     09-04  Mg     2.0     09-04        Urinalysis Basic - ( 04 Sep 2024 04:44 )    Color: x / Appearance: x / SG: x / pH: x  Gluc: 82 mg/dL / Ketone: x  / Bili: x / Urobili: x   Blood: x / Protein: x / Nitrite: x   Leuk Esterase: x / RBC: x / WBC x   Sq Epi: x / Non Sq Epi: x / Bacteria: x          HEAD CT 8/29 - 1.  No significant change, without interval rebleeding.    CT CAP 9/4 - No acute findings in the chest. Moderate simple density fluid in the pelvis, more than is typical for physiologic changes. Urinary bladder wall thickening may be secondary to underdistention or cystitis.No other acute finding in the abdomen or pelvis.  ----------------------------------------------------------------------------------------  PHYSICAL EXAM  Constitutional - NAD, Uncomfortable   Extremities - Right LE shin ecchymosis  Neurologic Exam -                    Cognitive - AAOx4     Communication - Fluent, No dysarthria     Cranial Nerves - CN 2-12 intact     Motor - No focal deficits  Psychiatric - Distressed  ----------------------------------------------------------------------------------------  ASSESSMENT/PLAN  59yFemale with functional deficits after a fall sustaining trauma  Traumatic SAH - Stable, Keppra  Anxiety D/O - Atarax, Zoloft  Pain - LIDODERM to right shin, Oxycodone  Post-Traumatic Heacaches - CONSIDER TRIAL Nortriptyline 10mg HS  DVT PPX - SCDs, Lovenox  Rehab/Impaired mobility and function - Patient continues to require hospitalization for the above diagnoses and ongoing active management of comorbid complications (hypotension which appears stable)  that are substantially posing a threat to bodily function, functional ability and quality of life, necessitating transfer of hospitalization at an acute inpatient rehabilitation facility.     When medically optimized, based on the patient's diagnosis, current functional status and potential for progress, recommend ACUTE inpatient rehabilitation for the functional deficits consisting of 3 hours of therapy/day & 24 hour RN/daily PMR physician for active comorbid medical management. Patient will be able to tolerate 3 hours a day.    Will continue to follow. Rehab recommendations are dependent on how functional progress changes as well as how patient continues to participate and tolerate therapeutic interventions, WHICH MAY CHANGE UPON FOLLOW UP EVALUATIONS. Recommend ongoing mobilization by staff to maintain cardiopulmonary function and prevention of secondary complications related to debility. Discussed the specific rehabilitation management and recommendations above with Misti ZHANG 
SUBJECTIVE / 24H EVENTS:    Pt seen and examined at bedside by the team during rounds. No overnight events or new complaints. Pt denies CP, SOB, N/V, fever, chills.     MEDICATIONS  (STANDING):  cefTRIAXone Injectable. 1000 milliGRAM(s) IV Push every 24 hours  enoxaparin Injectable 30 milliGRAM(s) SubCutaneous every 12 hours  folic acid 1 milliGRAM(s) Oral daily  levETIRAcetam   Injectable 500 milliGRAM(s) IV Push every 12 hours  multivitamin/minerals 1 Tablet(s) Oral daily  pantoprazole    Tablet 40 milliGRAM(s) Oral before breakfast  psyllium Powder 1 Packet(s) Oral daily  tamsulosin 0.4 milliGRAM(s) Oral at bedtime  thiamine 100 milliGRAM(s) Oral daily  tiotropium 2.5 MICROgram(s) Inhaler 2 Puff(s) Inhalation daily    MEDICATIONS  (PRN):  acetaminophen     Tablet .. 975 milliGRAM(s) Oral every 6 hours PRN Mild Pain (1 - 3)  aluminum hydroxide/magnesium hydroxide/simethicone Suspension 30 milliLiter(s) Oral every 6 hours PRN Dyspepsia  naloxone 1 mG/mL Injection for Intranasal Use 5 milliGRAM(s) IntraNasal once PRN overdose  oxyCODONE    IR 5 milliGRAM(s) Oral every 6 hours PRN Moderate Pain (4 - 6)  oxyCODONE    IR 10 milliGRAM(s) Oral every 6 hours PRN Severe Pain (7 - 10)  oxyCODONE    IR 2.5 milliGRAM(s) Oral every 6 hours PRN break through pain      Vital Signs Last 24 Hrs  T(C): 36.9 (03 Sep 2024 13:30), Max: 37.8 (02 Sep 2024 23:30)  T(F): 98.5 (03 Sep 2024 13:30), Max: 100.1 (02 Sep 2024 23:30)  HR: 86 (03 Sep 2024 13:30) (86 - 111)  BP: 88/53 (03 Sep 2024 13:30) (88/53 - 122/72)  BP(mean): --  RR: 18 (03 Sep 2024 13:30) (17 - 18)  SpO2: 96% (03 Sep 2024 13:30) (95% - 98%)    Parameters below as of 03 Sep 2024 13:30  Patient On (Oxygen Delivery Method): room air        Physical Exam  Constitutional: resting comfortably in bed, NAD  HEENT: EOMI  Respiratory: normal respiratory effort, no accessory muscle use, no conversational dyspnea  Cardiovascular: regular rate & rhythm  Gastrointestinal: abdomen is soft & non-distended, non-tender, no rebound tenderness / guarding  Neurological: A&O x 3  Musculoskeletal: [place] x 4 spontaneously, extremities are without point tenderness or deformity  Skin: mucous membranes moist, no diaphoresis, pallor, cyanosis or jaundice      I&O's Detail    02 Sep 2024 07:01  -  03 Sep 2024 07:00  --------------------------------------------------------  IN:    Oral Fluid: 750 mL    Sodium Chloride 0.9% Bolus: 500 mL  Total IN: 1250 mL    OUT:  Total OUT: 0 mL    Total NET: 1250 mL          LABS:                        8.2    4.90  )-----------( 223      ( 03 Sep 2024 04:56 )             24.9     09-03    142  |  105  |  6.9<L>  ----------------------------<  87  4.8   |  29.0  |  0.50    Ca    9.1      03 Sep 2024 04:56  Phos  4.1     09-03  Mg     2.1     09-03    TPro  6.2<L>  /  Alb  3.6  /  TBili  0.4  /  DBili  x   /  AST  44<H>  /  ALT  31  /  AlkPhos  108  09-02    PT/INR - ( 02 Sep 2024 00:23 )   PT: 10.5 sec;   INR: 0.94 ratio         PTT - ( 02 Sep 2024 00:23 )  PTT:34.6 sec
Subjective: .Code sepsis called overnight, HR of 110 and temp of 102.7. other vitals included O2 of 96, /71, RR 17. Patient complaint of continued epigastric pain. Patient had a positive urine Cx on 8/29 for e.coli, though WBC and leuk esterase were normal and it had high epithelial cells so specimen is likely contaminated, abx ordered to cover possible UTI. Labs including blood cx drawn, ofirmev and bolus given. Belly was soft and mildly tender to epigastrium      MEDICATIONS  (STANDING):  cefTRIAXone Injectable. 1000 milliGRAM(s) IV Push every 24 hours  enoxaparin Injectable 30 milliGRAM(s) SubCutaneous every 12 hours  folic acid 1 milliGRAM(s) Oral daily  levETIRAcetam   Injectable 500 milliGRAM(s) IV Push every 12 hours  multivitamin 1 Tablet(s) Oral daily  pantoprazole    Tablet 40 milliGRAM(s) Oral before breakfast  psyllium Powder 1 Packet(s) Oral daily  sodium chloride 0.9% Bolus 1500 milliLiter(s) IV Bolus once  tamsulosin 0.4 milliGRAM(s) Oral at bedtime  tiotropium 2.5 MICROgram(s) Inhaler 2 Puff(s) Inhalation daily    MEDICATIONS  (PRN):  aluminum hydroxide/magnesium hydroxide/simethicone Suspension 30 milliLiter(s) Oral every 6 hours PRN Dyspepsia  oxyCODONE    IR 2.5 milliGRAM(s) Oral every 4 hours PRN Moderate Pain (4 - 6)  oxyCODONE    IR 5 milliGRAM(s) Oral every 4 hours PRN Severe Pain (7 - 10)      Vital Signs Last 24 Hrs  T(C): 39.3 (02 Sep 2024 00:12), Max: 39.3 (02 Sep 2024 00:12)  T(F): 102.7 (02 Sep 2024 00:12), Max: 102.7 (02 Sep 2024 00:12)  HR: 110 (02 Sep 2024 00:12) (99 - 112)  BP: 113/71 (02 Sep 2024 00:12) (97/62 - 113/71)  BP(mean): --  RR: 18 (02 Sep 2024 00:12) (18 - 20)  SpO2: 96% (02 Sep 2024 00:12) (94% - 98%)    Parameters below as of 02 Sep 2024 00:12  Patient On (Oxygen Delivery Method): room air        Physical Exam:    Constitutional: NAD  HEENT: PERRL, EOMI  Respiratory: Respirations non-labored, no accessory muscle use  Gastrointestinal: Soft, mildly tender, non-distended  Neurological: A&O x 3  Ext: all extremities moving spontaneously without limitations. Some ecchymosis on right arm, right hip, and right anterior shin    LABS:                        8.9    5.96  )-----------( 151      ( 01 Sep 2024 05:40 )             26.9     09-01    136  |  97  |  4.6<L>  ----------------------------<  90  3.8   |  28.0  |  0.52    Ca    8.9      01 Sep 2024 05:40  Phos  3.6     09-01  Mg     1.8     09-01    TPro  5.8<L>  /  Alb  3.3  /  TBili  0.4  /  DBili  0.1  /  AST  60<H>  /  ALT  36<H>  /  AlkPhos  91  08-31      Urinalysis Basic - ( 01 Sep 2024 05:40 )    Color: x / Appearance: x / SG: x / pH: x  Gluc: 90 mg/dL / Ketone: x  / Bili: x / Urobili: x   Blood: x / Protein: x / Nitrite: x   Leuk Esterase: x / RBC: x / WBC x   Sq Epi: x / Non Sq Epi: x / Bacteria: x

## 2024-09-05 NOTE — PATIENT PROFILE ADULT - FUNCTIONAL ASSESSMENT - BASIC MOBILITY 6.
4-calculated by average/Not able to assess (calculate score using [facility] averaging method)

## 2024-09-05 NOTE — H&P ADULT - NSHPREVIEWOFSYSTEMS_GEN_ALL_CORE
REVIEW OF SYSTEMS  Constitutional: No fever, No Chills, No fatigue  HEENT: No eye pain, No visual disturbances, No difficulty hearing, No Dysphagia   Pulm: No cough,  No shortness of breath  Cardio: No chest pain, No palpitations  GI: +incontinent of bladder, No dysuria, No frequency, No hematuria, No incontinence  Neuro: No headaches, No memory loss, No loss of strength, No numbness, No tremors  Skin: No itching, No rashes, No lesions   Endo: No temperature intolerance  MSK: + R knee and ankle pain, swelling and bruising  Psych: + anxiety No depression REVIEW OF SYSTEMS  Constitutional: No fever, No Chills, No fatigue  HEENT: No eye pain, No visual disturbances, No difficulty hearing, No Dysphagia   Pulm: No cough,  No shortness of breath  Cardio: No chest pain, No palpitations  GI: +incontinent of bladder, No dysuria, No frequency, No hematuria, No incontinence  Neuro: No headaches, No memory loss, No loss of strength, No numbness, No tremors  Skin: No itching, No rashes, No lesions   Endo: No temperature intolerance  MSK: (+) R knee and ankle pain, swelling and bruising  Psych: (+) anxiety, (+) depression

## 2024-09-06 LAB
ALBUMIN SERPL ELPH-MCNC: 3.4 G/DL — SIGNIFICANT CHANGE UP (ref 3.3–5)
ALP SERPL-CCNC: 96 U/L — SIGNIFICANT CHANGE UP (ref 40–120)
ALT FLD-CCNC: 28 U/L — SIGNIFICANT CHANGE UP (ref 10–45)
ANION GAP SERPL CALC-SCNC: 8 MMOL/L — SIGNIFICANT CHANGE UP (ref 5–17)
ANISOCYTOSIS BLD QL: SLIGHT — SIGNIFICANT CHANGE UP
AST SERPL-CCNC: 25 U/L — SIGNIFICANT CHANGE UP (ref 10–40)
BASOPHILS # BLD AUTO: 0 K/UL — SIGNIFICANT CHANGE UP (ref 0–0.2)
BASOPHILS NFR BLD AUTO: 0 % — SIGNIFICANT CHANGE UP (ref 0–2)
BILIRUB SERPL-MCNC: 0.6 MG/DL — SIGNIFICANT CHANGE UP (ref 0.2–1.2)
BUN SERPL-MCNC: 5 MG/DL — LOW (ref 7–23)
CALCIUM SERPL-MCNC: 10.1 MG/DL — SIGNIFICANT CHANGE UP (ref 8.4–10.5)
CHLORIDE SERPL-SCNC: 100 MMOL/L — SIGNIFICANT CHANGE UP (ref 96–108)
CO2 SERPL-SCNC: 31 MMOL/L — SIGNIFICANT CHANGE UP (ref 22–31)
CREAT SERPL-MCNC: 0.73 MG/DL — SIGNIFICANT CHANGE UP (ref 0.5–1.3)
EGFR: 96 ML/MIN/1.73M2 — SIGNIFICANT CHANGE UP
EOSINOPHIL # BLD AUTO: 0.32 K/UL — SIGNIFICANT CHANGE UP (ref 0–0.5)
EOSINOPHIL NFR BLD AUTO: 5.1 % — SIGNIFICANT CHANGE UP (ref 0–6)
GLUCOSE SERPL-MCNC: 90 MG/DL — SIGNIFICANT CHANGE UP (ref 70–99)
HCT VFR BLD CALC: 29.1 % — LOW (ref 34.5–45)
HGB BLD-MCNC: 9.5 G/DL — LOW (ref 11.5–15.5)
LG PLATELETS BLD QL AUTO: SLIGHT — SIGNIFICANT CHANGE UP
LYMPHOCYTES # BLD AUTO: 3.02 K/UL — SIGNIFICANT CHANGE UP (ref 1–3.3)
LYMPHOCYTES # BLD AUTO: 47.5 % — HIGH (ref 13–44)
MACROCYTES BLD QL: SLIGHT — SIGNIFICANT CHANGE UP
MANUAL SMEAR VERIFICATION: SIGNIFICANT CHANGE UP
MCHC RBC-ENTMCNC: 32.6 GM/DL — SIGNIFICANT CHANGE UP (ref 32–36)
MCHC RBC-ENTMCNC: 34.7 PG — HIGH (ref 27–34)
MCV RBC AUTO: 106.2 FL — HIGH (ref 80–100)
MONOCYTES # BLD AUTO: 0.64 K/UL — SIGNIFICANT CHANGE UP (ref 0–0.9)
MONOCYTES NFR BLD AUTO: 10.1 % — SIGNIFICANT CHANGE UP (ref 2–14)
NEUTROPHILS # BLD AUTO: 2.25 K/UL — SIGNIFICANT CHANGE UP (ref 1.8–7.4)
NEUTROPHILS NFR BLD AUTO: 33.3 % — LOW (ref 43–77)
NEUTS BAND # BLD: 2 % — SIGNIFICANT CHANGE UP (ref 0–8)
NRBC # BLD: 0 /100 WBCS — SIGNIFICANT CHANGE UP (ref 0–0)
PLAT MORPH BLD: NORMAL — SIGNIFICANT CHANGE UP
PLATELET # BLD AUTO: 560 K/UL — HIGH (ref 150–400)
POIKILOCYTOSIS BLD QL AUTO: SLIGHT — SIGNIFICANT CHANGE UP
POLYCHROMASIA BLD QL SMEAR: SLIGHT — SIGNIFICANT CHANGE UP
POTASSIUM SERPL-MCNC: 3.8 MMOL/L — SIGNIFICANT CHANGE UP (ref 3.5–5.3)
POTASSIUM SERPL-SCNC: 3.8 MMOL/L — SIGNIFICANT CHANGE UP (ref 3.5–5.3)
PROT SERPL-MCNC: 7.2 G/DL — SIGNIFICANT CHANGE UP (ref 6–8.3)
RBC # BLD: 2.74 M/UL — LOW (ref 3.8–5.2)
RBC # FLD: 14.4 % — SIGNIFICANT CHANGE UP (ref 10.3–14.5)
RBC BLD AUTO: SIGNIFICANT CHANGE UP
SODIUM SERPL-SCNC: 139 MMOL/L — SIGNIFICANT CHANGE UP (ref 135–145)
VARIANT LYMPHS # BLD: 2 % — SIGNIFICANT CHANGE UP (ref 0–6)
WBC # BLD: 6.36 K/UL — SIGNIFICANT CHANGE UP (ref 3.8–10.5)
WBC # FLD AUTO: 6.36 K/UL — SIGNIFICANT CHANGE UP (ref 3.8–10.5)

## 2024-09-06 PROCEDURE — 99223 1ST HOSP IP/OBS HIGH 75: CPT | Mod: GC

## 2024-09-06 PROCEDURE — 99223 1ST HOSP IP/OBS HIGH 75: CPT

## 2024-09-06 PROCEDURE — 93971 EXTREMITY STUDY: CPT | Mod: 26,RT

## 2024-09-06 RX ORDER — OXYCODONE HYDROCHLORIDE 5 MG/1
5 TABLET ORAL EVERY 4 HOURS
Refills: 0 | Status: DISCONTINUED | OUTPATIENT
Start: 2024-09-06 | End: 2024-09-11

## 2024-09-06 RX ADMIN — Medication 1 PATCH: at 11:08

## 2024-09-06 RX ADMIN — Medication 1000 MICROGRAM(S): at 11:09

## 2024-09-06 RX ADMIN — Medication 100 MILLIGRAM(S): at 11:10

## 2024-09-06 RX ADMIN — OXYCODONE HYDROCHLORIDE 5 MILLIGRAM(S): 5 TABLET ORAL at 06:32

## 2024-09-06 RX ADMIN — Medication 1 PATCH: at 23:46

## 2024-09-06 RX ADMIN — PSYLLIUM HUSK 1 PACKET(S): 3.4 GRANULE ORAL at 11:07

## 2024-09-06 RX ADMIN — ENOXAPARIN SODIUM 30 MILLIGRAM(S): 100 INJECTION SUBCUTANEOUS at 17:48

## 2024-09-06 RX ADMIN — OXYCODONE HYDROCHLORIDE 10 MILLIGRAM(S): 5 TABLET ORAL at 10:03

## 2024-09-06 RX ADMIN — Medication 40 MILLIGRAM(S): at 06:32

## 2024-09-06 RX ADMIN — Medication 2 TABLET(S): at 21:30

## 2024-09-06 RX ADMIN — Medication 300 MILLIGRAM(S): at 13:04

## 2024-09-06 RX ADMIN — Medication 1 TABLET(S): at 11:08

## 2024-09-06 RX ADMIN — Medication 81 MILLIGRAM(S): at 11:08

## 2024-09-06 RX ADMIN — Medication 0.5 MILLIGRAM(S): at 11:12

## 2024-09-06 RX ADMIN — Medication 30 MILLILITER(S): at 06:32

## 2024-09-06 RX ADMIN — OXYCODONE HYDROCHLORIDE 10 MILLIGRAM(S): 5 TABLET ORAL at 01:55

## 2024-09-06 RX ADMIN — Medication 2000 UNIT(S): at 11:10

## 2024-09-06 RX ADMIN — Medication 1 PATCH: at 19:51

## 2024-09-06 RX ADMIN — Medication 300 MILLIGRAM(S): at 21:30

## 2024-09-06 RX ADMIN — ENOXAPARIN SODIUM 30 MILLIGRAM(S): 100 INJECTION SUBCUTANEOUS at 06:32

## 2024-09-06 RX ADMIN — Medication 1 MILLIGRAM(S): at 11:08

## 2024-09-06 RX ADMIN — OXYCODONE HYDROCHLORIDE 10 MILLIGRAM(S): 5 TABLET ORAL at 17:51

## 2024-09-06 NOTE — DIETITIAN INITIAL EVALUATION ADULT - PERTINENT MEDS FT
MEDICATIONS  (STANDING):  albuterol    90 MICROgram(s) HFA Inhaler 2 Puff(s) Inhalation daily  aspirin enteric coated 81 milliGRAM(s) Oral daily  atorvastatin 80 milliGRAM(s) Oral at bedtime  carvedilol 6.25 milliGRAM(s) Oral every 12 hours  cholecalciferol 2000 Unit(s) Oral daily  clonazePAM  Tablet 0.5 milliGRAM(s) Oral daily  cyanocobalamin 1000 MICROGram(s) Oral daily  diltiazem    milliGRAM(s) Oral daily  enoxaparin Injectable 30 milliGRAM(s) SubCutaneous every 12 hours  folic acid 1 milliGRAM(s) Oral daily  gabapentin 300 milliGRAM(s) Oral every 8 hours  lidocaine   4% Patch 1 Patch Transdermal daily  multivitamin 1 Tablet(s) Oral daily  pantoprazole    Tablet 40 milliGRAM(s) Oral before breakfast  polyethylene glycol 3350 17 Gram(s) Oral daily  psyllium Powder 1 Packet(s) Oral daily  senna 2 Tablet(s) Oral at bedtime  sertraline 25 milliGRAM(s) Oral daily  thiamine 100 milliGRAM(s) Oral daily  tiotropium 2.5 MICROgram(s) Inhaler 2 Puff(s) Inhalation daily    MEDICATIONS  (PRN):  acetaminophen     Tablet .. 650 milliGRAM(s) Oral every 8 hours PRN Mild Pain (1 - 3)  aluminum hydroxide/magnesium hydroxide/simethicone Suspension 30 milliLiter(s) Oral every 6 hours PRN Dyspepsia  cyclobenzaprine 10 milliGRAM(s) Oral daily PRN Muscle Spasm  diazepam    Tablet 5 milliGRAM(s) Oral daily PRN Anxiety  oxyCODONE    IR 10 milliGRAM(s) Oral every 6 hours PRN Severe Pain (7 - 10)  oxyCODONE    IR 5 milliGRAM(s) Oral every 4 hours PRN Moderate Pain (4 - 6)  simethicone 80 milliGRAM(s) Chew every 6 hours PRN Gas

## 2024-09-06 NOTE — DIETITIAN INITIAL EVALUATION ADULT - PERTINENT LABORATORY DATA
09-06    139  |  100  |  5<L>  ----------------------------<  90  3.8   |  31  |  0.73    Ca    10.1      06 Sep 2024 06:08    TPro  7.2  /  Alb  3.4  /  TBili  0.6  /  DBili  x   /  AST  25  /  ALT  28  /  AlkPhos  96  09-06  A1C with Estimated Average Glucose Result: 5.0 % (01-26-24 @ 06:53)

## 2024-09-06 NOTE — DIETITIAN INITIAL EVALUATION ADULT - MUSCLE MASS (LOSS OF MUSCLE)
Goal Outcome Evaluation:  Plan of Care Reviewed With: patient        Progress: improving  Outcome Evaluation: Pt seen by PT this AM for tx. Pt was in bed and sat up to EOB w/ SV and incr time. Pt stood req min A/ CGA. Pt's distance incr today w/ pt amb 45' req CGA w/ use of fww. Pt was mildly unsteady w/ no overt LOB. Pt performed ther ex then returned to bed w/ SV. PT will prog as pt nahun.      Anticipated Discharge Disposition (PT): home with home health, home with assist                         Temples.../Clavicles.../Thigh.../Calf.../Dorsal hand...

## 2024-09-06 NOTE — CONSULT NOTE ADULT - TIME BILLING
This includes reviewing results/imaging and discussions with specialists, nursing, case management/social work. Further tests, medications, and procedures have been ordered as indicated. Results and the plan of care were communicated to the patient and/or their family member. Supporting documentation was completed and added to the patient's chart.

## 2024-09-06 NOTE — SBIRT NOTE ADULT - NSSBIRTALCPASSREFTXDET_GEN_A_CORE
SW discussed substance use tx with patient. Patient reported she was in a 30 day alcohol rehab program "years ago" and stated that she doesn't want to go to a program, however would be interested in psychological services in the community at discharge. SW to follow.

## 2024-09-06 NOTE — DIETITIAN INITIAL EVALUATION ADULT - ORAL INTAKE PTA/DIET HISTORY
Patient Does Not Follow Diet @Home  Doesn't Consumes a Set Number of Meals a Day   Usually Cooks For Self - Typically Easy to Prepare/Ready Made Food  Doesn't Take Vitamin/Supplements @Home

## 2024-09-06 NOTE — DIETITIAN INITIAL EVALUATION ADULT - FACTORS AFF FOOD INTAKE
Good PO Intake/Appetite over Last Week  Prior to Admission to Hospital Intake Decrease  (Per Patient)/persistent lack of appetite

## 2024-09-06 NOTE — DIETITIAN INITIAL EVALUATION ADULT - OTHER INFO
Initial Nutrition Assessment   59yr Old Female   Denies Food Allergy/Intolerance  Tolerates Diet Well - No Chewing/Swallowing Complications (Per Patient)  No Pressure Ulcers (as Per Nursing Flow Sheets)  +3 Edema Noted to Feet (as Per Nursing Flow Sheets)  No Recent Nausea/Constipation & Some Recent Vomiting/Diarrhea (as Per Patient)
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
## 2024-09-06 NOTE — DIETITIAN INITIAL EVALUATION ADULT - NS FNS DIET ORDER
on Regular Diet (IDDSI Level 7) w/ Thin Liquids (IDDSI Level 0) & Ensure Plus High Protein 8oz PO TID  Education Provided on Proper Nutrition

## 2024-09-07 LAB
CULTURE RESULTS: SIGNIFICANT CHANGE UP
CULTURE RESULTS: SIGNIFICANT CHANGE UP
SPECIMEN SOURCE: SIGNIFICANT CHANGE UP
SPECIMEN SOURCE: SIGNIFICANT CHANGE UP

## 2024-09-07 PROCEDURE — 99232 SBSQ HOSP IP/OBS MODERATE 35: CPT | Mod: GC

## 2024-09-07 PROCEDURE — 90792 PSYCH DIAG EVAL W/MED SRVCS: CPT

## 2024-09-07 PROCEDURE — 99232 SBSQ HOSP IP/OBS MODERATE 35: CPT

## 2024-09-07 RX ADMIN — Medication 2000 UNIT(S): at 11:41

## 2024-09-07 RX ADMIN — Medication 1 MILLIGRAM(S): at 11:42

## 2024-09-07 RX ADMIN — Medication 2 TABLET(S): at 21:08

## 2024-09-07 RX ADMIN — ACETAMINOPHEN 650 MILLIGRAM(S): 325 TABLET ORAL at 10:06

## 2024-09-07 RX ADMIN — OXYCODONE HYDROCHLORIDE 10 MILLIGRAM(S): 5 TABLET ORAL at 21:09

## 2024-09-07 RX ADMIN — Medication 300 MILLIGRAM(S): at 14:13

## 2024-09-07 RX ADMIN — ENOXAPARIN SODIUM 30 MILLIGRAM(S): 100 INJECTION SUBCUTANEOUS at 17:52

## 2024-09-07 RX ADMIN — Medication 1000 MICROGRAM(S): at 11:42

## 2024-09-07 RX ADMIN — Medication 40 MILLIGRAM(S): at 06:09

## 2024-09-07 RX ADMIN — ENOXAPARIN SODIUM 30 MILLIGRAM(S): 100 INJECTION SUBCUTANEOUS at 06:08

## 2024-09-07 RX ADMIN — Medication 1 TABLET(S): at 11:42

## 2024-09-07 RX ADMIN — OXYCODONE HYDROCHLORIDE 10 MILLIGRAM(S): 5 TABLET ORAL at 12:48

## 2024-09-07 RX ADMIN — OXYCODONE HYDROCHLORIDE 10 MILLIGRAM(S): 5 TABLET ORAL at 22:55

## 2024-09-07 RX ADMIN — ACETAMINOPHEN 650 MILLIGRAM(S): 325 TABLET ORAL at 11:06

## 2024-09-07 RX ADMIN — Medication 1 PATCH: at 19:18

## 2024-09-07 RX ADMIN — Medication 100 MILLIGRAM(S): at 11:41

## 2024-09-07 RX ADMIN — OXYCODONE HYDROCHLORIDE 10 MILLIGRAM(S): 5 TABLET ORAL at 13:48

## 2024-09-07 RX ADMIN — OXYCODONE HYDROCHLORIDE 10 MILLIGRAM(S): 5 TABLET ORAL at 06:08

## 2024-09-07 RX ADMIN — CARVEDILOL 6.25 MILLIGRAM(S): 6.25 TABLET ORAL at 06:08

## 2024-09-07 RX ADMIN — PSYLLIUM HUSK 1 PACKET(S): 3.4 GRANULE ORAL at 11:41

## 2024-09-07 RX ADMIN — OXYCODONE HYDROCHLORIDE 10 MILLIGRAM(S): 5 TABLET ORAL at 07:10

## 2024-09-07 RX ADMIN — Medication 1 PATCH: at 11:42

## 2024-09-07 RX ADMIN — Medication 300 MILLIGRAM(S): at 21:08

## 2024-09-07 RX ADMIN — Medication 0.5 MILLIGRAM(S): at 11:44

## 2024-09-07 RX ADMIN — Medication 300 MILLIGRAM(S): at 06:08

## 2024-09-07 RX ADMIN — Medication 1 PATCH: at 23:54

## 2024-09-07 NOTE — BH CONSULTATION LIAISON ASSESSMENT NOTE - CURRENT MEDICATION
MEDICATIONS  (STANDING):  albuterol    90 MICROgram(s) HFA Inhaler 2 Puff(s) Inhalation daily  aspirin enteric coated 81 milliGRAM(s) Oral daily  atorvastatin 80 milliGRAM(s) Oral at bedtime  carvedilol 6.25 milliGRAM(s) Oral every 12 hours  cholecalciferol 2000 Unit(s) Oral daily  clonazePAM  Tablet 0.5 milliGRAM(s) Oral daily  cyanocobalamin 1000 MICROGram(s) Oral daily  diltiazem    milliGRAM(s) Oral daily  enoxaparin Injectable 30 milliGRAM(s) SubCutaneous every 12 hours  folic acid 1 milliGRAM(s) Oral daily  gabapentin 300 milliGRAM(s) Oral every 8 hours  lidocaine   4% Patch 1 Patch Transdermal daily  multivitamin 1 Tablet(s) Oral daily  pantoprazole    Tablet 40 milliGRAM(s) Oral before breakfast  polyethylene glycol 3350 17 Gram(s) Oral daily  psyllium Powder 1 Packet(s) Oral daily  senna 2 Tablet(s) Oral at bedtime  sertraline 25 milliGRAM(s) Oral daily  thiamine 100 milliGRAM(s) Oral daily  tiotropium 2.5 MICROgram(s) Inhaler 2 Puff(s) Inhalation daily    MEDICATIONS  (PRN):  acetaminophen     Tablet .. 650 milliGRAM(s) Oral every 8 hours PRN Mild Pain (1 - 3)  aluminum hydroxide/magnesium hydroxide/simethicone Suspension 30 milliLiter(s) Oral every 6 hours PRN Dyspepsia  cyclobenzaprine 10 milliGRAM(s) Oral daily PRN Muscle Spasm  diazepam    Tablet 5 milliGRAM(s) Oral daily PRN Anxiety  oxyCODONE    IR 5 milliGRAM(s) Oral every 4 hours PRN Moderate Pain (4 - 6)  oxyCODONE    IR 10 milliGRAM(s) Oral every 6 hours PRN Severe Pain (7 - 10)  simethicone 80 milliGRAM(s) Chew every 6 hours PRN Gas
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
## 2024-09-07 NOTE — BH CONSULTATION LIAISON ASSESSMENT NOTE - ADDITIONAL DETAILS ALL
Per chart review, Patient's first suicide attempt was in 2015 when she began taking her children's amitriptyline admist her divorce. She relays began "hearing voices telling me to kill myself", overdosed and ultimately admitted for inpatient psych. In 2018 she was prescribed amitriptyline for migraine prophylaxis and reports she began hearing the same voices again, which resulted in her stopping her own medications.
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
## 2024-09-07 NOTE — BH CONSULTATION LIAISON ASSESSMENT NOTE - ADDITIONAL PSYCHIATRIC MEDICATIONS
[facility] Reference #[id number]    Prescription Information      PDI Filter:    PDI	My Rx	Current Rx	Drug Type	Rx Written	Rx Dispensed	Drug	Quantity	Days Supply	Prescriber Name	Prescriber ESTRELLITA #	Payment Method	Dispenser  A	N	N	O	06/26/2024	06/26/2024	tramadol hcl 50 mg tablet	42	14	Cr Maddox MD	[id number]	[facility] #[id number]  A	N	N	O	06/24/2024	06/25/2024	oxycodone-acetaminophen 5-325 mg tablet	5	2	Analy Miller	[id number]	[facility] #[id number]  A	N	N	B	04/24/2024	04/27/2024	clonazepam 0.5 mg tablet	30	30	Pito Chadwick MD	[id number]	Medicaid	[facility] #[id number]  A	N	N	B	04/26/2024	04/27/2024	alprazolam 0.5 mg tablet	1	1	Pito Chadwick MD	[id number]	Medicaid	[facility] #[id number]

## 2024-09-07 NOTE — BH CONSULTATION LIAISON ASSESSMENT NOTE - NSBHCONSULTFOLLOWAFTERCARE_PSY_A_CORE FT
Patient reports living in [place] and would like to seek therapy around her area. Recommend:  1. Counseling at [facility] ([phone])  2. [facility] ([phone])  3. [facility] ([phone])  4. [facility] [phone])

## 2024-09-07 NOTE — BH CONSULTATION LIAISON ASSESSMENT NOTE - NSICDXBHSECONDARYDX_PSY_ALL_CORE
Post traumatic stress disorder (PTSD)   F43.10  Alcohol use disorder, moderate, dependence   F10.20

## 2024-09-07 NOTE — BH CONSULTATION LIAISON ASSESSMENT NOTE - NSBHCHARTREVIEWVS_PSY_A_CORE FT
Vital Signs Last 24 Hrs  T(C): 36.7 (07 Sep 2024 12:26), Max: 36.8 (06 Sep 2024 21:27)  T(F): 98.1 (07 Sep 2024 12:26), Max: 98.2 (06 Sep 2024 21:27)  HR: 83 (07 Sep 2024 12:26) (83 - 91)  BP: 102/69 (07 Sep 2024 12:26) (102/68 - 108/68)  BP(mean): --  RR: 16 (07 Sep 2024 12:26) (16 - 16)  SpO2: 99% (07 Sep 2024 12:26) (94% - 99%)    Parameters below as of 07 Sep 2024 12:26  Patient On (Oxygen Delivery Method): room air

## 2024-09-07 NOTE — BH CONSULTATION LIAISON ASSESSMENT NOTE - NSBHSAALC_PSY_A_CORE FT
first use - years ago  last use - 2 weeks on admission to [facility] (the last 3 months , one pint vodka every 3 days. Patient has since stopped during current hospitalization)

## 2024-09-07 NOTE — BH CONSULTATION LIAISON ASSESSMENT NOTE - PAST PSYCHOTROPIC MEDICATION
Amitriptyline  Amitriptyline   Patient also reports using Klonopin PRN for her spasmodic dysphonia, stating she received one bottle at the beginning of the year and still has 10 pills remaining, as she only uses 0.25 mg sparingly when her dysphonia becomes severe.
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
## 2024-09-07 NOTE — BH CONSULTATION LIAISON ASSESSMENT NOTE - NSBHCHARTREVIEWLAB_PSY_A_CORE FT
Complete Blood Count + Automated Diff in AM (09.06.24 @ 06:08)   WBC Count: 6.36 K/uL  RBC Count: 2.74 M/uL  Hemoglobin: 9.5 g/dL  Hematocrit: 29.1 %  Mean Cell Volume: 106.2 fl  Mean Cell Hemoglobin: 34.7 pg  Mean Cell Hemoglobin Conc: 32.6 gm/dL  Red Cell Distrib Width: 14.4 %  Platelet Count - Automated: 560 K/uL  Auto Neutrophil #: 2.25 K/uL  Auto Lymphocyte #: 3.02 K/uL  Auto Monocyte #: 0.64 K/uL  Auto Eosinophil #: 0.32 K/uL  Auto Basophil #: 0.00 K/uL  Auto Neutrophil %: 33.3: Differential percentages must be correlated with absolute numbers for   clinical significance. %  Auto Lymphocyte %: 47.5 %  Auto Monocyte %: 10.1 %  Auto Eosinophil %: 5.1 %  Auto Basophil %: 0.0 %    Comprehensive Metabolic Panel in AM (09.06.24 @ 06:08)   Sodium: 139 mmol/L  Potassium: 3.8 mmol/L  Chloride: 100 mmol/L  Carbon Dioxide: 31 mmol/L  Anion Gap: 8 mmol/L  Blood Urea Nitrogen: 5 mg/dL  Creatinine: 0.73 mg/dL  Glucose: 90 mg/dL  Calcium: 10.1 mg/dL  Protein Total: 7.2 g/dL  Albumin: 3.4 g/dL  Bilirubin Total: 0.6 mg/dL  Alkaline Phosphatase: 96 U/L  Aspartate Aminotransferase (AST/SGOT): 25 U/L  Alanine Aminotransferase (ALT/SGPT): 28 U/L  eGFR: 96:
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
## 2024-09-07 NOTE — BH CONSULTATION LIAISON ASSESSMENT NOTE - NSBHCHARTREVIEWINVESTIGATE_PSY_A_CORE FT
< from: 12 Lead ECG (09.03.24 @ 19:00) >      Ventricular Rate 95 BPM    Atrial Rate 95 BPM    P-R Interval 126 ms    QRS Duration 70 ms    Q-T Interval 330 ms    QTC Calculation(Bazett) 414 ms    P Axis 42 degrees    R Axis 57 degrees    T Axis 41 degrees    Diagnosis Line Normal sinus rhythm  Normal ECG    Confirmed by Butch Lu MD ([id number]) on 9/4/2024 9:23:56 AM    < end of copied text >      < from: CT Head No Cont (08.29.24 @ 17:53) >      ACC: [id number] EXAM:  CT BRAIN   ORDERED BY: RENATO GALVEZ     PROCEDURE DATE:  08/29/2024          INTERPRETATION:  EXAMINATION: CT HEAD    CLINICAL INDICATION: SAH monitoring  TECHNIQUE: CT images of the head were obtained without contrast. Coronal   and sagittal reconstructions were performed. Dose reduction techniques   were utilized including but not limited to automated exposure control   (AEC), iterative reconstruction technique, and/or mA and/or kV dose   adjustments based on patient size.  COMPARISON: Head CT 8/29/2024 at 12:28 AM. Head CT 8/28/2024. Head MRI   1/26/2024.    FINDINGS:    Again noted is localized subarachnoid blood in the right sylvian fissure,   not significantly changed. No interval rebleeding.    Mild generalized cerebral volume loss. No premature white matter disease.    No midline shift or herniation. No CT evidence of acute territorial   infarction, although MRI with DWI would be more sensitive.    The visualized sinuses and mastoids are clear.    Limited views of the orbits and visualized soft tissues of the neck,   face, scalp, skull base, and calvarium are otherwise unremarkable.    IMPRESSION:    1.  No significant change, without interval rebleeding.        Patient Name: RAY GARCIA  MRN: [id number], Accession: [id number]  DOS: 08/29/24 05:53 PM    --- End of Report ---            SHAUN SYLVESTER MD; Attending Radiologist  This document has been electronically signed. Aug 30 2024  1:18AM    < end of copied text >

## 2024-09-08 DIAGNOSIS — F10.20 ALCOHOL DEPENDENCE, UNCOMPLICATED: ICD-10-CM

## 2024-09-08 DIAGNOSIS — F43.10 POST-TRAUMATIC STRESS DISORDER, UNSPECIFIED: ICD-10-CM

## 2024-09-08 PROCEDURE — 99232 SBSQ HOSP IP/OBS MODERATE 35: CPT | Mod: GC

## 2024-09-08 PROCEDURE — 99232 SBSQ HOSP IP/OBS MODERATE 35: CPT

## 2024-09-08 RX ORDER — HYDROXYZINE HCL 25 MG
25 TABLET ORAL EVERY 8 HOURS
Refills: 0 | Status: DISCONTINUED | OUTPATIENT
Start: 2024-09-08 | End: 2024-09-11

## 2024-09-08 RX ORDER — GABAPENTIN 100 MG
400 CAPSULE ORAL EVERY 8 HOURS
Refills: 0 | Status: DISCONTINUED | OUTPATIENT
Start: 2024-09-08 | End: 2024-09-11

## 2024-09-08 RX ORDER — SERTRALINE HYDROCHLORIDE 50 MG/1
50 TABLET, FILM COATED ORAL DAILY
Refills: 0 | Status: DISCONTINUED | OUTPATIENT
Start: 2024-09-08 | End: 2024-09-11

## 2024-09-08 RX ORDER — PRAZOSIN HCL 2 MG
1 CAPSULE ORAL AT BEDTIME
Refills: 0 | Status: DISCONTINUED | OUTPATIENT
Start: 2024-09-08 | End: 2024-09-11

## 2024-09-08 RX ADMIN — Medication 0.5 MILLIGRAM(S): at 11:24

## 2024-09-08 RX ADMIN — Medication 1 PATCH: at 11:20

## 2024-09-08 RX ADMIN — Medication 1 PATCH: at 19:32

## 2024-09-08 RX ADMIN — ENOXAPARIN SODIUM 30 MILLIGRAM(S): 100 INJECTION SUBCUTANEOUS at 17:42

## 2024-09-08 RX ADMIN — ENOXAPARIN SODIUM 30 MILLIGRAM(S): 100 INJECTION SUBCUTANEOUS at 06:28

## 2024-09-08 RX ADMIN — Medication 81 MILLIGRAM(S): at 11:23

## 2024-09-08 RX ADMIN — Medication 80 MILLIGRAM(S): at 21:40

## 2024-09-08 RX ADMIN — Medication 300 MILLIGRAM(S): at 06:28

## 2024-09-08 RX ADMIN — Medication 1000 MICROGRAM(S): at 11:22

## 2024-09-08 RX ADMIN — PSYLLIUM HUSK 1 PACKET(S): 3.4 GRANULE ORAL at 11:23

## 2024-09-08 RX ADMIN — OXYCODONE HYDROCHLORIDE 10 MILLIGRAM(S): 5 TABLET ORAL at 13:07

## 2024-09-08 RX ADMIN — Medication 400 MILLIGRAM(S): at 14:50

## 2024-09-08 RX ADMIN — Medication 1 TABLET(S): at 11:22

## 2024-09-08 RX ADMIN — Medication 100 MILLIGRAM(S): at 11:22

## 2024-09-08 RX ADMIN — OXYCODONE HYDROCHLORIDE 10 MILLIGRAM(S): 5 TABLET ORAL at 07:17

## 2024-09-08 RX ADMIN — Medication 10 MILLIGRAM(S): at 21:40

## 2024-09-08 RX ADMIN — Medication 1 MILLIGRAM(S): at 11:22

## 2024-09-08 RX ADMIN — Medication 40 MILLIGRAM(S): at 06:29

## 2024-09-08 RX ADMIN — ACETAMINOPHEN 650 MILLIGRAM(S): 325 TABLET ORAL at 11:21

## 2024-09-08 RX ADMIN — OXYCODONE HYDROCHLORIDE 10 MILLIGRAM(S): 5 TABLET ORAL at 20:20

## 2024-09-08 RX ADMIN — Medication 400 MILLIGRAM(S): at 21:41

## 2024-09-08 RX ADMIN — OXYCODONE HYDROCHLORIDE 10 MILLIGRAM(S): 5 TABLET ORAL at 06:34

## 2024-09-08 RX ADMIN — OXYCODONE HYDROCHLORIDE 10 MILLIGRAM(S): 5 TABLET ORAL at 19:40

## 2024-09-08 RX ADMIN — Medication 1 PATCH: at 22:22

## 2024-09-08 RX ADMIN — Medication 2000 UNIT(S): at 11:19
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
## 2024-09-09 DIAGNOSIS — S06.6XAA TRAUMATIC SUBARACHNOID HEMORRHAGE WITH LOSS OF CONSCIOUSNESS STATUS UNKNOWN, INITIAL ENCOUNTER: ICD-10-CM

## 2024-09-09 LAB
ALBUMIN SERPL ELPH-MCNC: 2.9 G/DL — LOW (ref 3.3–5)
ALP SERPL-CCNC: 71 U/L — SIGNIFICANT CHANGE UP (ref 40–120)
ALT FLD-CCNC: 21 U/L — SIGNIFICANT CHANGE UP (ref 10–45)
ANION GAP SERPL CALC-SCNC: 10 MMOL/L — SIGNIFICANT CHANGE UP (ref 5–17)
AST SERPL-CCNC: 22 U/L — SIGNIFICANT CHANGE UP (ref 10–40)
BASOPHILS # BLD AUTO: 0.21 K/UL — HIGH (ref 0–0.2)
BASOPHILS NFR BLD AUTO: 2.4 % — HIGH (ref 0–2)
BILIRUB SERPL-MCNC: 0.4 MG/DL — SIGNIFICANT CHANGE UP (ref 0.2–1.2)
BUN SERPL-MCNC: 10 MG/DL — SIGNIFICANT CHANGE UP (ref 7–23)
CALCIUM SERPL-MCNC: 9.3 MG/DL — SIGNIFICANT CHANGE UP (ref 8.4–10.5)
CHLORIDE SERPL-SCNC: 105 MMOL/L — SIGNIFICANT CHANGE UP (ref 96–108)
CO2 SERPL-SCNC: 29 MMOL/L — SIGNIFICANT CHANGE UP (ref 22–31)
CREAT SERPL-MCNC: 0.72 MG/DL — SIGNIFICANT CHANGE UP (ref 0.5–1.3)
EGFR: 97 ML/MIN/1.73M2 — SIGNIFICANT CHANGE UP
EOSINOPHIL # BLD AUTO: 0.12 K/UL — SIGNIFICANT CHANGE UP (ref 0–0.5)
EOSINOPHIL NFR BLD AUTO: 1.4 % — SIGNIFICANT CHANGE UP (ref 0–6)
GLUCOSE SERPL-MCNC: 84 MG/DL — SIGNIFICANT CHANGE UP (ref 70–99)
HCT VFR BLD CALC: 27.8 % — LOW (ref 34.5–45)
HGB BLD-MCNC: 8.9 G/DL — LOW (ref 11.5–15.5)
IMM GRANULOCYTES NFR BLD AUTO: 1.4 % — HIGH (ref 0–0.9)
LYMPHOCYTES # BLD AUTO: 2.64 K/UL — SIGNIFICANT CHANGE UP (ref 1–3.3)
LYMPHOCYTES # BLD AUTO: 30.8 % — SIGNIFICANT CHANGE UP (ref 13–44)
MCHC RBC-ENTMCNC: 32 GM/DL — SIGNIFICANT CHANGE UP (ref 32–36)
MCHC RBC-ENTMCNC: 34.8 PG — HIGH (ref 27–34)
MCV RBC AUTO: 108.6 FL — HIGH (ref 80–100)
MONOCYTES # BLD AUTO: 0.99 K/UL — HIGH (ref 0–0.9)
MONOCYTES NFR BLD AUTO: 11.5 % — SIGNIFICANT CHANGE UP (ref 2–14)
NEUTROPHILS # BLD AUTO: 4.5 K/UL — SIGNIFICANT CHANGE UP (ref 1.8–7.4)
NEUTROPHILS NFR BLD AUTO: 52.5 % — SIGNIFICANT CHANGE UP (ref 43–77)
NRBC # BLD: 0 /100 WBCS — SIGNIFICANT CHANGE UP (ref 0–0)
PLATELET # BLD AUTO: 435 K/UL — HIGH (ref 150–400)
POTASSIUM SERPL-MCNC: 4.4 MMOL/L — SIGNIFICANT CHANGE UP (ref 3.5–5.3)
POTASSIUM SERPL-SCNC: 4.4 MMOL/L — SIGNIFICANT CHANGE UP (ref 3.5–5.3)
PROT SERPL-MCNC: 6.4 G/DL — SIGNIFICANT CHANGE UP (ref 6–8.3)
RBC # BLD: 2.56 M/UL — LOW (ref 3.8–5.2)
RBC # FLD: 15 % — HIGH (ref 10.3–14.5)
SODIUM SERPL-SCNC: 144 MMOL/L — SIGNIFICANT CHANGE UP (ref 135–145)
WBC # BLD: 8.58 K/UL — SIGNIFICANT CHANGE UP (ref 3.8–10.5)
WBC # FLD AUTO: 8.58 K/UL — SIGNIFICANT CHANGE UP (ref 3.8–10.5)

## 2024-09-09 PROCEDURE — 99232 SBSQ HOSP IP/OBS MODERATE 35: CPT

## 2024-09-09 PROCEDURE — 90791 PSYCH DIAGNOSTIC EVALUATION: CPT

## 2024-09-09 PROCEDURE — 99232 SBSQ HOSP IP/OBS MODERATE 35: CPT | Mod: GC

## 2024-09-09 RX ORDER — CARVEDILOL 6.25 MG/1
6.25 TABLET ORAL EVERY 12 HOURS
Refills: 0 | Status: DISCONTINUED | OUTPATIENT
Start: 2024-09-09 | End: 2024-09-11

## 2024-09-09 RX ADMIN — SERTRALINE HYDROCHLORIDE 50 MILLIGRAM(S): 50 TABLET, FILM COATED ORAL at 11:44

## 2024-09-09 RX ADMIN — Medication 0.5 MILLIGRAM(S): at 11:44

## 2024-09-09 RX ADMIN — PSYLLIUM HUSK 1 PACKET(S): 3.4 GRANULE ORAL at 11:46

## 2024-09-09 RX ADMIN — Medication 1000 MICROGRAM(S): at 11:45

## 2024-09-09 RX ADMIN — Medication 80 MILLIGRAM(S): at 22:27

## 2024-09-09 RX ADMIN — Medication 400 MILLIGRAM(S): at 06:54

## 2024-09-09 RX ADMIN — Medication 1 PATCH: at 23:00

## 2024-09-09 RX ADMIN — OXYCODONE HYDROCHLORIDE 10 MILLIGRAM(S): 5 TABLET ORAL at 08:25

## 2024-09-09 RX ADMIN — OXYCODONE HYDROCHLORIDE 10 MILLIGRAM(S): 5 TABLET ORAL at 15:35

## 2024-09-09 RX ADMIN — Medication 1 MILLIGRAM(S): at 11:45

## 2024-09-09 RX ADMIN — Medication 80 MILLIGRAM(S): at 11:46

## 2024-09-09 RX ADMIN — OXYCODONE HYDROCHLORIDE 10 MILLIGRAM(S): 5 TABLET ORAL at 20:44

## 2024-09-09 RX ADMIN — Medication 2000 UNIT(S): at 11:44

## 2024-09-09 RX ADMIN — Medication 40 MILLIGRAM(S): at 06:55

## 2024-09-09 RX ADMIN — POLYETHYLENE GLYCOL 3350 17 GRAM(S): 17 POWDER, FOR SOLUTION ORAL at 11:45

## 2024-09-09 RX ADMIN — Medication 1 TABLET(S): at 11:44

## 2024-09-09 RX ADMIN — Medication 2 TABLET(S): at 22:27

## 2024-09-09 RX ADMIN — ENOXAPARIN SODIUM 30 MILLIGRAM(S): 100 INJECTION SUBCUTANEOUS at 17:11

## 2024-09-09 RX ADMIN — OXYCODONE HYDROCHLORIDE 10 MILLIGRAM(S): 5 TABLET ORAL at 09:10

## 2024-09-09 RX ADMIN — Medication 400 MILLIGRAM(S): at 22:27

## 2024-09-09 RX ADMIN — Medication 1 PATCH: at 11:45

## 2024-09-09 RX ADMIN — OXYCODONE HYDROCHLORIDE 10 MILLIGRAM(S): 5 TABLET ORAL at 14:49

## 2024-09-09 RX ADMIN — ENOXAPARIN SODIUM 30 MILLIGRAM(S): 100 INJECTION SUBCUTANEOUS at 06:54

## 2024-09-09 RX ADMIN — OXYCODONE HYDROCHLORIDE 10 MILLIGRAM(S): 5 TABLET ORAL at 05:28

## 2024-09-09 RX ADMIN — Medication 1 PATCH: at 19:18

## 2024-09-09 RX ADMIN — Medication 400 MILLIGRAM(S): at 14:49

## 2024-09-09 RX ADMIN — OXYCODONE HYDROCHLORIDE 10 MILLIGRAM(S): 5 TABLET ORAL at 01:42

## 2024-09-09 RX ADMIN — Medication 81 MILLIGRAM(S): at 11:44

## 2024-09-09 RX ADMIN — Medication 100 MILLIGRAM(S): at 11:44
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
## 2024-09-10 ENCOUNTER — TRANSCRIPTION ENCOUNTER (OUTPATIENT)
Age: 59
End: 2024-09-10

## 2024-09-10 PROCEDURE — 99233 SBSQ HOSP IP/OBS HIGH 50: CPT | Mod: GC

## 2024-09-10 RX ADMIN — Medication 400 MILLIGRAM(S): at 13:08

## 2024-09-10 RX ADMIN — Medication 400 MILLIGRAM(S): at 21:14

## 2024-09-10 RX ADMIN — OXYCODONE HYDROCHLORIDE 10 MILLIGRAM(S): 5 TABLET ORAL at 23:55

## 2024-09-10 RX ADMIN — OXYCODONE HYDROCHLORIDE 10 MILLIGRAM(S): 5 TABLET ORAL at 03:00

## 2024-09-10 RX ADMIN — ENOXAPARIN SODIUM 30 MILLIGRAM(S): 100 INJECTION SUBCUTANEOUS at 17:25

## 2024-09-10 RX ADMIN — CARVEDILOL 6.25 MILLIGRAM(S): 6.25 TABLET ORAL at 18:13

## 2024-09-10 RX ADMIN — Medication 40 MILLIGRAM(S): at 06:33

## 2024-09-10 RX ADMIN — OXYCODONE HYDROCHLORIDE 10 MILLIGRAM(S): 5 TABLET ORAL at 02:01

## 2024-09-10 RX ADMIN — OXYCODONE HYDROCHLORIDE 10 MILLIGRAM(S): 5 TABLET ORAL at 15:05

## 2024-09-10 RX ADMIN — Medication 80 MILLIGRAM(S): at 21:15

## 2024-09-10 RX ADMIN — Medication 0.5 MILLIGRAM(S): at 11:21

## 2024-09-10 RX ADMIN — Medication 2000 UNIT(S): at 11:21

## 2024-09-10 RX ADMIN — OXYCODONE HYDROCHLORIDE 10 MILLIGRAM(S): 5 TABLET ORAL at 02:45

## 2024-09-10 RX ADMIN — OXYCODONE HYDROCHLORIDE 10 MILLIGRAM(S): 5 TABLET ORAL at 09:30

## 2024-09-10 RX ADMIN — Medication 1 MILLIGRAM(S): at 11:21

## 2024-09-10 RX ADMIN — PSYLLIUM HUSK 1 PACKET(S): 3.4 GRANULE ORAL at 11:22

## 2024-09-10 RX ADMIN — ENOXAPARIN SODIUM 30 MILLIGRAM(S): 100 INJECTION SUBCUTANEOUS at 06:33

## 2024-09-10 RX ADMIN — Medication 1 MILLIGRAM(S): at 21:15

## 2024-09-10 RX ADMIN — Medication 1000 MICROGRAM(S): at 11:21

## 2024-09-10 RX ADMIN — Medication 1 PATCH: at 11:21

## 2024-09-10 RX ADMIN — Medication 81 MILLIGRAM(S): at 11:21

## 2024-09-10 RX ADMIN — Medication 1 PATCH: at 23:55

## 2024-09-10 RX ADMIN — Medication 100 MILLIGRAM(S): at 11:21

## 2024-09-10 RX ADMIN — SERTRALINE HYDROCHLORIDE 50 MILLIGRAM(S): 50 TABLET, FILM COATED ORAL at 11:21

## 2024-09-10 RX ADMIN — OXYCODONE HYDROCHLORIDE 10 MILLIGRAM(S): 5 TABLET ORAL at 08:43

## 2024-09-10 RX ADMIN — Medication 2 TABLET(S): at 21:15

## 2024-09-10 RX ADMIN — Medication 400 MILLIGRAM(S): at 06:33

## 2024-09-10 RX ADMIN — OXYCODONE HYDROCHLORIDE 10 MILLIGRAM(S): 5 TABLET ORAL at 21:14

## 2024-09-10 RX ADMIN — Medication 1 TABLET(S): at 11:21

## 2024-09-10 NOTE — DISCHARGE NOTE PROVIDER - CARE PROVIDER_API CALL
Za Altman  Physical/Rehab Medicine  [address] [place] [address]  Phone: [phone]  Fax: [phone]  Established Patient  Follow Up Time:    Za Altman  Physical/Rehab Medicine  [address] [place] [address]  Phone: [phone]  Fax: [phone]  Established Patient  Follow Up Time:     Maribel Gomez  Psychiatry  Established Patient  Follow Up Time:     Corry Sanchez  Neurosurgery  [address] [place] [id number]  Phone: [phone]  Fax: [phone]  Established Patient  Follow Up Time:

## 2024-09-10 NOTE — DISCHARGE NOTE PROVIDER - NSDCFUADDAPPT_GEN_ALL_CORE_FT
Please follow up with your PCP  within one week of discharge from subacute rehab.     Please follow up with ENT  Please follow up with your PCP  within one week of discharge from subacute rehab.   please follow up on incidental finding was identified and discussed with you or your surrogate: 1.3cm Hepatic cyst    Please follow up with ENT, Psychiatry and neurologist.
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
## 2024-09-10 NOTE — DISCHARGE NOTE PROVIDER - CARE PROVIDERS DIRECT ADDRESSES
,[email or web address],[email or web address],DirectAddress_Unknown

## 2024-09-10 NOTE — DISCHARGE NOTE PROVIDER - NSDCQMSTAIRS_GEN_ALL_CORE
Warm compresses 3-4 times daily. Follow-up with ophthalmology and primary care, return to ER for any change or worsening symptoms.
Yes

## 2024-09-10 NOTE — CHART NOTE - NSCHARTNOTEFT_GEN_A_CORE
Nutrition Follow Up Note  Hospital Course   (Per Electronic Medical Record)    Source:  Patient [X]  Medical Record [X]      Diet:   Regular Diet (IDDSI Level 7) w/ Thin Liquids (IDDSI Level 0)  Tolerates Diet Consistency Well  No Chewing/Swallowing Difficulties  No Recent Nausea, Vomiting, Diarrhea or Constipation (as Per Patient)  Consumes % of Meals (as Per Documentation) - States Good PO Intake/Appetite (Per Patient)   on Ensure Plus High Protein 8oz PO TID (Provides 1,050kcal & 60grams of Protein)  Patient Takes Nutrition Supplement But Only Twice a Day  Recommend Decrease Supplement to BID (Per Patient Request)  Education Provided on Proper Nutrition    Enteral/Parenteral Nutrition: Not Applicable    Current Weight: 118.3lb on 9/5  Obtain New Weight  Obtain Weights Weekly     Pertinent Medications: MEDICATIONS  (STANDING):  albuterol    90 MICROgram(s) HFA Inhaler 2 Puff(s) Inhalation daily  aspirin enteric coated 81 milliGRAM(s) Oral daily  atorvastatin 80 milliGRAM(s) Oral at bedtime  carvedilol 6.25 milliGRAM(s) Oral every 12 hours  cholecalciferol 2000 Unit(s) Oral daily  clonazePAM  Tablet 0.5 milliGRAM(s) Oral daily  cyanocobalamin 1000 MICROGram(s) Oral daily  diltiazem    milliGRAM(s) Oral daily  enoxaparin Injectable 30 milliGRAM(s) SubCutaneous every 12 hours  folic acid 1 milliGRAM(s) Oral daily  gabapentin 400 milliGRAM(s) Oral every 8 hours  lidocaine   4% Patch 1 Patch Transdermal daily  multivitamin 1 Tablet(s) Oral daily  pantoprazole    Tablet 40 milliGRAM(s) Oral before breakfast  polyethylene glycol 3350 17 Gram(s) Oral daily  prazosin 1 milliGRAM(s) Oral at bedtime  psyllium Powder 1 Packet(s) Oral daily  senna 2 Tablet(s) Oral at bedtime  sertraline 50 milliGRAM(s) Oral daily  thiamine 100 milliGRAM(s) Oral daily  tiotropium 2.5 MICROgram(s) Inhaler 2 Puff(s) Inhalation daily    MEDICATIONS  (PRN):  acetaminophen     Tablet .. 650 milliGRAM(s) Oral every 8 hours PRN Mild Pain (1 - 3)  aluminum hydroxide/magnesium hydroxide/simethicone Suspension 30 milliLiter(s) Oral every 6 hours PRN Dyspepsia  cyclobenzaprine 10 milliGRAM(s) Oral daily PRN Muscle Spasm  hydrOXYzine hydrochloride 25 milliGRAM(s) Oral every 8 hours PRN Anxiety  oxyCODONE    IR 5 milliGRAM(s) Oral every 4 hours PRN Moderate Pain (4 - 6)  oxyCODONE    IR 10 milliGRAM(s) Oral every 6 hours PRN Severe Pain (7 - 10)  simethicone 80 milliGRAM(s) Chew every 6 hours PRN Gas    Pertinent Labs:  09-09 Na144 mmol/L Glu 84 mg/dL K+ 4.4 mmol/L Cr  0.72 mg/dL BUN 10 mg/dL 09-04 Phos 3.8 mg/dL 09-09 Alb 2.9 g/dL<L> 08-31 Chol --    LDL --    HDL --    Trig 109 mg/dL    Skin: No Pressure Ulcers     Edema: None Noted Recently (as Per Documentation)     Last Bowel Movement: on 9/9    Estimated Needs:   [X] No Change Since Previous Assessment    Previous Nutrition Diagnosis:   Severe Malnutrition     Nutrition Diagnosis is [X] Ongoing - Recommend Decrease Supplement to BID (Per Patient Request)     New Nutrition Diagnosis: [X] Not Applicable    Interventions:   1. Education Provided on Proper Nutrition   2. Recommend Decrease Supplement to Ensure Plus High Protein 8oz PO BID   3. Recommend Continue Nutrition Plan of Care     Monitoring & Evaluation:   [X] Weights   [X] PO Intake   [X] Skin Integrity   [X] Follow Up (Per Protocol)  [X] Tolerance to Diet Prescription   [X] Other: Labs     Registered Dietitian/Nutritionist Remains Available.  Chinmay Mobley, PAT, CDN    Phone# [phone]

## 2024-09-10 NOTE — DISCHARGE NOTE PROVIDER - NSDCMRMEDTOKEN_GEN_ALL_CORE_FT
acetaminophen 325 mg oral tablet: 2 tab(s) orally every 6 hours As needed Temp greater or equal to 38C (100.4F), Mild Pain (1 - 3)  albuterol 2.5 mg/3 mL (0.083%) inhalation solution: 3 milliliter(s) by nebulizer once a day  aspirin 81 mg oral tablet: 1 tab(s) orally once a day  atorvastatin 80 mg oral tablet: 1 tab(s) orally once a day (at bedtime)  carvedilol 6.25 mg oral tablet: 1 tab(s) orally 2 times a day  cholecalciferol oral tablet: 2000 unit(s) orally once a day  clonazePAM 0.5 mg oral tablet: 1 tab(s) orally once a day  cyanocobalamin 1000 mcg oral tablet: 1 tab(s) orally once a day  cyclobenzaprine 10 mg oral tablet: 1 tab(s) orally once a day as needed for  DilTIAZem (Eqv-Cardizem CD) 120 mg/24 hours oral capsule, extended release: 1 cap(s) orally once a day  folic acid 1 mg oral tablet: 1 tab(s) orally once a day  gabapentin 300 mg oral capsule: 1 cap(s) orally every 8 hours  Multiple Vitamins with Minerals oral tablet: 1 tab(s) orally once a day  sertraline 25 mg oral tablet: 1 tab(s) orally once a day  simethicone 80 mg oral tablet, chewable: 1 tab(s) orally every 6 hours As needed Gas  Spiriva Respimat 10 ACT 2.5 mcg/inh inhalation aerosol: 2 puff(s) inhaled once a day  thiamine 100 mg oral tablet: 1 tab(s) orally once a day  Valium 5 mg oral tablet: 1 tab(s) orally once a day as needed for   albuterol 90 mcg/inh inhalation aerosol: 2 puff(s) inhaled once a day  aspirin 81 mg oral tablet: 1 tab(s) orally once a day  atorvastatin 80 mg oral tablet: 1 tab(s) orally once a day (at bedtime)  carvedilol 6.25 mg oral tablet: 1 tab(s) orally 2 times a day  cholecalciferol oral tablet: 2000 unit(s) orally once a day  clonazePAM 0.5 mg oral tablet: 1 tab(s) orally once a day  cyanocobalamin 1000 mcg oral tablet: 1 tab(s) orally once a day  dilTIAZem 120 mg/24 hours oral capsule, extended release: 120 milligram(s) orally once a day  enoxaparin: 40 milligram(s) subcutaneous once a day 40 mg subcutanous daily  folic acid 1 mg oral tablet: 1 tab(s) orally once a day  gabapentin 400 mg oral capsule: 1 cap(s) orally every 8 hours  hydrOXYzine hydrochloride 25 mg oral tablet: 1 tab(s) orally every 8 hours As needed Anxiety  Multiple Vitamins with Minerals oral tablet: 1 tab(s) orally once a day  oxyCODONE 10 mg oral tablet: 1 tab(s) orally every 6 hours As needed Severe Pain (7 - 10)  oxyCODONE 5 mg oral tablet: 1 tab(s) orally every 4 hours As needed Moderate Pain (4 - 6)  pantoprazole 40 mg oral delayed release tablet: 1 tab(s) orally once a day (before a meal)  prazosin 1 mg oral capsule: 1 cap(s) orally once a day (at bedtime)  senna leaf extract oral tablet: 2 tab(s) orally once a day (at bedtime)  sertraline 50 mg oral tablet: 1 tab(s) orally once a day  simethicone 80 mg oral tablet, chewable: 1 tab(s) orally every 6 hours As needed Gas  thiamine 100 mg oral tablet: 1 tab(s) orally once a day  tiotropium 2.5 mcg/inh inhalation aerosol: 2 puff(s) inhaled once a day   albuterol 90 mcg/inh inhalation aerosol: 2 puff(s) inhaled once a day  aspirin 81 mg oral tablet: 1 tab(s) orally once a day  atorvastatin 80 mg oral tablet: 1 tab(s) orally once a day (at bedtime)  carvedilol 6.25 mg oral tablet: 1 tab(s) orally 2 times a day  cholecalciferol oral tablet: 2000 unit(s) orally once a day  clonazePAM 0.5 mg oral tablet: 1 tab(s) orally once a day  cyanocobalamin 1000 mcg oral tablet: 1 tab(s) orally once a day  dilTIAZem 120 mg/24 hours oral capsule, extended release: 120 milligram(s) orally once a day  enoxaparin: 40 milligram(s) subcutaneous once a day 40 mg subcutanous daily  folic acid 1 mg oral tablet: 1 tab(s) orally once a day  gabapentin 400 mg oral capsule: 1 cap(s) orally every 8 hours  hydrOXYzine hydrochloride 25 mg oral tablet: 1 tab(s) orally every 8 hours As needed Anxiety  Multiple Vitamins with Minerals oral tablet: 1 tab(s) orally once a day  oxyCODONE 10 mg oral tablet: 1 tab(s) orally every 6 hours As needed Severe Pain (7 - 10)  oxyCODONE 5 mg oral tablet: 1 tab(s) orally every 4 hours As needed Moderate Pain (4 - 6)  pantoprazole 40 mg oral delayed release tablet: 1 tab(s) orally once a day (before a meal)  senna leaf extract oral tablet: 2 tab(s) orally once a day (at bedtime)  sertraline 50 mg oral tablet: 1 tab(s) orally once a day  simethicone 80 mg oral tablet, chewable: 1 tab(s) orally every 6 hours As needed Gas  thiamine 100 mg oral tablet: 1 tab(s) orally once a day  tiotropium 2.5 mcg/inh inhalation aerosol: 2 puff(s) inhaled once a day

## 2024-09-10 NOTE — DISCHARGE NOTE PROVIDER - NSDCCPCAREPLAN_GEN_ALL_CORE_FT
PRINCIPAL DISCHARGE DIAGNOSIS  Diagnosis: SAH (subarachnoid hemorrhage)  Assessment and Plan of Treatment: you fell and suffered a Subarchnoid Hemmorhage  Follow up: Please call and make an appointment with Neurosurgeon in 4-6 weeks, the Acute Care Surgery [facility] 10-14 days after discharge. Also, please call and make an appointment with your primary care physician as per your usual schedule.

## 2024-09-10 NOTE — DISCHARGE NOTE PROVIDER - PROVIDER TOKENS
PROVIDER:[TOKEN:[[id number]],ESTABLISHEDPATIENT:[T]] PROVIDER:[TOKEN:[[id number]],ESTABLISHEDPATIENT:[T]],PROVIDER:[TOKEN:[[id number]],ESTABLISHEDPATIENT:[T]],PROVIDER:[TOKEN:[[id number]],ESTABLISHEDPATIENT:[T]]

## 2024-09-11 ENCOUNTER — TRANSCRIPTION ENCOUNTER (OUTPATIENT)
Age: 59
End: 2024-09-11

## 2024-09-11 VITALS — SYSTOLIC BLOOD PRESSURE: 122 MMHG | HEART RATE: 98 BPM | DIASTOLIC BLOOD PRESSURE: 72 MMHG

## 2024-09-11 PROCEDURE — 92523 SPEECH SOUND LANG COMPREHEN: CPT | Mod: GN

## 2024-09-11 PROCEDURE — 87635 SARS-COV-2 COVID-19 AMP PRB: CPT

## 2024-09-11 PROCEDURE — 92507 TX SP LANG VOICE COMM INDIV: CPT | Mod: GN

## 2024-09-11 PROCEDURE — 97110 THERAPEUTIC EXERCISES: CPT | Mod: GO

## 2024-09-11 PROCEDURE — 85025 COMPLETE CBC W/AUTO DIFF WBC: CPT

## 2024-09-11 PROCEDURE — 92610 EVALUATE SWALLOWING FUNCTION: CPT | Mod: GN

## 2024-09-11 PROCEDURE — 93971 EXTREMITY STUDY: CPT

## 2024-09-11 PROCEDURE — 97116 GAIT TRAINING THERAPY: CPT | Mod: GP

## 2024-09-11 PROCEDURE — 36415 COLL VENOUS BLD VENIPUNCTURE: CPT

## 2024-09-11 PROCEDURE — 97530 THERAPEUTIC ACTIVITIES: CPT | Mod: GP

## 2024-09-11 PROCEDURE — 97165 OT EVAL LOW COMPLEX 30 MIN: CPT | Mod: GO

## 2024-09-11 PROCEDURE — 97535 SELF CARE MNGMENT TRAINING: CPT | Mod: GO

## 2024-09-11 PROCEDURE — 99239 HOSP IP/OBS DSCHRG MGMT >30: CPT | Mod: GC

## 2024-09-11 PROCEDURE — 97161 PT EVAL LOW COMPLEX 20 MIN: CPT | Mod: GP

## 2024-09-11 PROCEDURE — 94640 AIRWAY INHALATION TREATMENT: CPT

## 2024-09-11 PROCEDURE — 80053 COMPREHEN METABOLIC PANEL: CPT

## 2024-09-11 RX ORDER — CYANOCOBALAMIN (VITAMIN B-12) 500MCG/0.1
1 GEL (ML) NASAL
Qty: 0 | Refills: 0 | DISCHARGE
Start: 2024-09-11

## 2024-09-11 RX ORDER — OXYCODONE HYDROCHLORIDE 5 MG/1
1 TABLET ORAL
Qty: 0 | Refills: 0 | DISCHARGE
Start: 2024-09-11

## 2024-09-11 RX ORDER — PANTOPRAZOLE SODIUM 40 MG
1 TABLET, DELAYED RELEASE (ENTERIC COATED) ORAL
Qty: 0 | Refills: 0 | DISCHARGE
Start: 2024-09-11

## 2024-09-11 RX ORDER — SERTRALINE HYDROCHLORIDE 50 MG/1
1 TABLET, FILM COATED ORAL
Qty: 0 | Refills: 0 | DISCHARGE
Start: 2024-09-11

## 2024-09-11 RX ORDER — PRAZOSIN HCL 2 MG
1 CAPSULE ORAL
Qty: 0 | Refills: 0 | DISCHARGE
Start: 2024-09-11

## 2024-09-11 RX ORDER — HYDROXYZINE HCL 25 MG
1 TABLET ORAL
Qty: 0 | Refills: 0 | DISCHARGE
Start: 2024-09-11

## 2024-09-11 RX ORDER — SENNA 187 MG
2 TABLET ORAL
Qty: 0 | Refills: 0 | DISCHARGE
Start: 2024-09-11

## 2024-09-11 RX ORDER — DILTIAZEM HYDROCHLORIDE 5 MG/ML
120 INJECTION INTRAVENOUS DAILY
Refills: 0 | Status: DISCONTINUED | OUTPATIENT
Start: 2024-09-12 | End: 2024-09-11

## 2024-09-11 RX ORDER — DILTIAZEM HYDROCHLORIDE 5 MG/ML
60 INJECTION INTRAVENOUS DAILY
Refills: 0 | Status: DISCONTINUED | OUTPATIENT
Start: 2024-09-11 | End: 2024-09-11

## 2024-09-11 RX ORDER — FLU VACCINE TS 2012-2013(5YR+) 45MCG/.5ML
0.5 VIAL (ML) INTRAMUSCULAR ONCE
Refills: 0 | Status: COMPLETED | OUTPATIENT
Start: 2024-09-11 | End: 2024-09-11

## 2024-09-11 RX ORDER — GABAPENTIN 100 MG
1 CAPSULE ORAL
Refills: 0 | DISCHARGE

## 2024-09-11 RX ORDER — FOLIC ACID 1 MG
1 TABLET ORAL
Qty: 0 | Refills: 0 | DISCHARGE
Start: 2024-09-11

## 2024-09-11 RX ORDER — DILTIAZEM HYDROCHLORIDE 5 MG/ML
120 INJECTION INTRAVENOUS
Qty: 0 | Refills: 0 | DISCHARGE
Start: 2024-09-11

## 2024-09-11 RX ORDER — DILTIAZEM HYDROCHLORIDE 5 MG/ML
1 INJECTION INTRAVENOUS
Refills: 0 | DISCHARGE

## 2024-09-11 RX ORDER — GABAPENTIN 100 MG
1 CAPSULE ORAL
Qty: 0 | Refills: 0 | DISCHARGE
Start: 2024-09-11

## 2024-09-11 RX ORDER — ENOXAPARIN SODIUM 100 MG/ML
40 INJECTION SUBCUTANEOUS
Qty: 0 | Refills: 0 | DISCHARGE
Start: 2024-09-11

## 2024-09-11 RX ORDER — TIOTROPIUM BROMIDE INHALATION SPRAY 3.12 UG/1
2 SPRAY, METERED RESPIRATORY (INHALATION)
Qty: 0 | Refills: 0 | DISCHARGE
Start: 2024-09-11

## 2024-09-11 RX ORDER — THIAMINE HCL 250 MG
1 TABLET ORAL
Qty: 0 | Refills: 0 | DISCHARGE
Start: 2024-09-11

## 2024-09-11 RX ORDER — DILTIAZEM HYDROCHLORIDE 5 MG/ML
1 INJECTION INTRAVENOUS
Qty: 0 | Refills: 0 | DISCHARGE
Start: 2024-09-11

## 2024-09-11 RX ADMIN — OXYCODONE HYDROCHLORIDE 5 MILLIGRAM(S): 5 TABLET ORAL at 05:35

## 2024-09-11 RX ADMIN — OXYCODONE HYDROCHLORIDE 10 MILLIGRAM(S): 5 TABLET ORAL at 12:11

## 2024-09-11 RX ADMIN — Medication 1000 MICROGRAM(S): at 12:13

## 2024-09-11 RX ADMIN — Medication 400 MILLIGRAM(S): at 13:14

## 2024-09-11 RX ADMIN — OXYCODONE HYDROCHLORIDE 10 MILLIGRAM(S): 5 TABLET ORAL at 05:51

## 2024-09-11 RX ADMIN — POLYETHYLENE GLYCOL 3350 17 GRAM(S): 17 POWDER, FOR SOLUTION ORAL at 12:12

## 2024-09-11 RX ADMIN — Medication 1 MILLIGRAM(S): at 12:13

## 2024-09-11 RX ADMIN — Medication 40 MILLIGRAM(S): at 05:37

## 2024-09-11 RX ADMIN — Medication 81 MILLIGRAM(S): at 12:12

## 2024-09-11 RX ADMIN — CARVEDILOL 6.25 MILLIGRAM(S): 6.25 TABLET ORAL at 05:37

## 2024-09-11 RX ADMIN — Medication 400 MILLIGRAM(S): at 05:34

## 2024-09-11 RX ADMIN — OXYCODONE HYDROCHLORIDE 10 MILLIGRAM(S): 5 TABLET ORAL at 18:21

## 2024-09-11 RX ADMIN — Medication 100 MILLIGRAM(S): at 12:12

## 2024-09-11 RX ADMIN — Medication 0.5 MILLIGRAM(S): at 12:19

## 2024-09-11 RX ADMIN — Medication 2000 UNIT(S): at 12:12

## 2024-09-11 RX ADMIN — Medication 2 PUFF(S): at 08:19

## 2024-09-11 RX ADMIN — TIOTROPIUM BROMIDE INHALATION SPRAY 2 PUFF(S): 3.12 SPRAY, METERED RESPIRATORY (INHALATION) at 08:18

## 2024-09-11 RX ADMIN — PSYLLIUM HUSK 1 PACKET(S): 3.4 GRANULE ORAL at 12:25

## 2024-09-11 RX ADMIN — SERTRALINE HYDROCHLORIDE 50 MILLIGRAM(S): 50 TABLET, FILM COATED ORAL at 12:12

## 2024-09-11 RX ADMIN — DILTIAZEM HYDROCHLORIDE 120 MILLIGRAM(S): 5 INJECTION INTRAVENOUS at 05:37

## 2024-09-11 RX ADMIN — ENOXAPARIN SODIUM 30 MILLIGRAM(S): 100 INJECTION SUBCUTANEOUS at 17:43

## 2024-09-11 RX ADMIN — Medication 1 PATCH: at 12:14

## 2024-09-11 RX ADMIN — Medication 1 TABLET(S): at 12:14

## 2024-09-11 RX ADMIN — ENOXAPARIN SODIUM 30 MILLIGRAM(S): 100 INJECTION SUBCUTANEOUS at 05:37

## 2024-09-11 RX ADMIN — OXYCODONE HYDROCHLORIDE 10 MILLIGRAM(S): 5 TABLET ORAL at 13:09

## 2024-09-11 RX ADMIN — CARVEDILOL 6.25 MILLIGRAM(S): 6.25 TABLET ORAL at 17:42

## 2024-09-11 NOTE — PROGRESS NOTE ADULT - NUTRITIONAL ASSESSMENT
This patient has been assessed with a concern for Malnutrition and has been determined to have a diagnosis/diagnoses of Severe protein-calorie malnutrition.    This patient is being managed with:   Diet Regular-  Supplement Feeding Modality:  Oral  Ensure Plus High Protein Cans or Servings Per Day:  1       Frequency:  Three Times a day  Entered: Sep  6 2024 12:26PM  
This patient has been assessed with a concern for Malnutrition and has been determined to have a diagnosis/diagnoses of Severe protein-calorie malnutrition.    This patient is being managed with:   Diet Regular-  Supplement Feeding Modality:  Oral  Ensure Pudding Cans or Servings Per Day:  3       Frequency:  Three Times a day  Ensure Plus High Protein Cans or Servings Per Day:  3       Frequency:  Three Times a day  Entered: Sep  5 2024  4:03PM    The following pending diet order is being considered for treatment of Severe protein-calorie malnutrition:  Diet Regular-  Supplement Feeding Modality:  Oral  Ensure Plus High Protein Cans or Servings Per Day:  1       Frequency:  Three Times a day  Entered: Sep  6 2024 12:26PM  
This patient has been assessed with a concern for Malnutrition and has been determined to have a diagnosis/diagnoses of Severe protein-calorie malnutrition.    This patient is being managed with:   Diet Regular-  Supplement Feeding Modality:  Oral  Ensure Pudding Cans or Servings Per Day:  3       Frequency:  Three Times a day  Ensure Plus High Protein Cans or Servings Per Day:  3       Frequency:  Three Times a day  Entered: Sep  5 2024  4:03PM    The following pending diet order is being considered for treatment of Severe protein-calorie malnutrition:  Diet Regular-  Supplement Feeding Modality:  Oral  Ensure Plus High Protein Cans or Servings Per Day:  1       Frequency:  Three Times a day  Entered: Sep  6 2024 12:26PM  
This patient has been assessed with a concern for Malnutrition and has been determined to have a diagnosis/diagnoses of Severe protein-calorie malnutrition.    This patient is being managed with:   Diet Regular-  Supplement Feeding Modality:  Oral  Ensure Plus High Protein Cans or Servings Per Day:  1       Frequency:  Three Times a day  Entered: Sep  6 2024 12:26PM  
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
## 2024-09-11 NOTE — PROGRESS NOTE ADULT - REASON FOR ADMISSION
Traumatic subarachnoid hemorrhage

## 2024-09-11 NOTE — PROGRESS NOTE ADULT - NSPROGADDITIONALINFOA_GEN_ALL_CORE
Spent 1 hour on evaluating, examining and with team meeting where we discussed patient's progress and discharge plan.
Spent 35 minutes on face-face visit, evaluate, examine and treat
Spent 50 minutes on face-face visit, evaluating, examining, preparing discharge, discuss discharge meds and F/U visits.
Spent 35 minutes on face-face visit, evaluate, examine and treat
Spent 35 minutes on face-face visit, evaluate, examine and treat

## 2024-09-11 NOTE — DISCHARGE NOTE NURSING/CASE MANAGEMENT/SOCIAL WORK - PATIENT PORTAL LINK FT
You can access the FollowMyHealth Patient Portal offered by [facility] by registering at the following website: [email or web address]. By joining [facility]’s FollowMyHealth portal, you will also be able to view your health information using other applications (apps) compatible with our system.
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
## 2024-09-11 NOTE — DISCHARGE NOTE NURSING/CASE MANAGEMENT/SOCIAL WORK - NSDCFUADDAPPT_GEN_ALL_CORE_FT
Please follow up with your PCP  within one week of discharge from subacute rehab.     Please follow up with ENT

## 2024-09-29 NOTE — ED ADULT TRIAGE NOTE - CHIEF COMPLAINT QUOTE
[Normal Conjunctiva] : normal conjunctiva [Normal Gait] : normal gait [No Edema] : no edema [Normal] : moves all extremities, no focal deficits, normal speech [de-identified] : no acute distress [de-identified] : supple, no carotid bruits b/l [de-identified] : JVP ~ 7 cm H20, RRR, s1, s2, no murmurs [de-identified] : unlabored respirations, clear lung fields PT BIBA from [facility] for subarachnoid hemorrhage. PT ETOH dependent with multiple falls over past few days. No blood thinners. 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 Received Ofirmev, keppra, thiamine and zofran [de-identified] : non-distended

## 2024-09-30 ENCOUNTER — NON-APPOINTMENT (OUTPATIENT)
Age: 59
End: 2024-09-30

## 2024-10-01 ENCOUNTER — APPOINTMENT (OUTPATIENT)
Dept: NEUROSURGERY | Facility: CLINIC | Age: 59
End: 2024-10-01
Payer: COMMERCIAL

## 2024-10-01 VITALS
HEART RATE: 75 BPM | SYSTOLIC BLOOD PRESSURE: 111 MMHG | DIASTOLIC BLOOD PRESSURE: 75 MMHG | TEMPERATURE: 97.5 F | OXYGEN SATURATION: 97 % | BODY MASS INDEX: 21.26 KG/M2 | HEIGHT: 63 IN | WEIGHT: 120 LBS

## 2024-10-01 DIAGNOSIS — R51.9 HEADACHE, UNSPECIFIED: ICD-10-CM

## 2024-10-01 DIAGNOSIS — R26.9 UNSPECIFIED ABNORMALITIES OF GAIT AND MOBILITY: ICD-10-CM

## 2024-10-01 PROCEDURE — 99204 OFFICE O/P NEW MOD 45 MIN: CPT

## 2024-10-03 NOTE — DATA REVIEWED
[de-identified] : CT Head Non Con ([facility], 9/24/24, read scanned into chart): No acute intracranial abnormality.  [de-identified] : MRI Cervical Spine ([facility], 6/17/24): The cervical cord demonstrates normal signal intensity and morphology. IMPRESSION:  1. Subacute compression fracture of the T1 vertebral body with mild loss of height. 2. C4-C5: Right lateral recess disc herniation superimposed on a disc bulge and bilateral uncovertebral joint hypertrophy impinging on ventral thecal sac with right greater than left neural foraminal narrowing impinging the right C5 nerve root. Mild interval progression. 3. C5-C6: Disc bulge and bilateral joint hypertrophy impinging on the ventral thecal sac with right greater than left neural foraminal narrowing with encroachment possible impingement on the right exiting C6 nerve root. No significant change. 4. C6-C7: Disc bulge with superimposed central disc herniation impinging on the ventral thecal sac. 5. Straightening of the normal cervical lordosis which may be due to muscle spasm.
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
## 2024-10-03 NOTE — PHYSICAL EXAM
[FreeTextEntry1] : Patient is awake and alert, oriented to person, place, and year Well appearing in no acute distress Following simple commands PERRL, EOMI  Face symmetric with normal eye closure and smile, Tongue midline, hearing grossly intact, speech clear, head turning and shoulder shrug intact No drift of upper extremities 5/5 Right upper extremity 5/5 Left upper extremity 5/5 Right lower extremity although guarding present secondary to residual right lower extremity pain 5/5 Left lower extremity Negative left clonus, pt would not allow clonus examination of RLE secondary to pain  Negative Davey's bilaterally  Sensation grossly intact to light touch on all extremities
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
## 2024-10-03 NOTE — DATA REVIEWED
[de-identified] : CT Head Non Con ([place], 9/24/24, read scanned into chart): No acute intracranial abnormality.  [de-identified] : MRI Cervical Spine ([facility], 6/17/24): The cervical cord demonstrates normal signal intensity and morphology. IMPRESSION:  1. Subacute compression fracture of the T1 vertebral body with mild loss of height. 2. C4-C5: Right lateral recess disc herniation superimposed on a disc bulge and bilateral uncovertebral joint hypertrophy impinging on ventral thecal sac with right greater than left neural foraminal narrowing impinging the right C5 nerve root. Mild interval progression. 3. C5-C6: Disc bulge and bilateral joint hypertrophy impinging on the ventral thecal sac with right greater than left neural foraminal narrowing with encroachment possible impingement on the right exiting C6 nerve root. No significant change. 4. C6-C7: Disc bulge with superimposed central disc herniation impinging on the ventral thecal sac. 5. Straightening of the normal cervical lordosis which may be due to muscle spasm.
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
## 2024-10-03 NOTE — REASON FOR VISIT
[Post Hospitalization] : a post hospitalization visit [Other: _____] : [unfilled] [FreeTextEntry1] : Subarachnoid hemorrhage of sylvian fissure, hospitalized from 8/28/24 - 9/11/24

## 2024-10-03 NOTE — DATA REVIEWED
[de-identified] : CT Head Non Con ([facility], 9/24/24, read scanned into chart): No acute intracranial abnormality.  [de-identified] : MRI Cervical Spine ([facility], 6/17/24): The cervical cord demonstrates normal signal intensity and morphology. IMPRESSION:  1. Subacute compression fracture of the T1 vertebral body with mild loss of height. 2. C4-C5: Right lateral recess disc herniation superimposed on a disc bulge and bilateral uncovertebral joint hypertrophy impinging on ventral thecal sac with right greater than left neural foraminal narrowing impinging the right C5 nerve root. Mild interval progression. 3. C5-C6: Disc bulge and bilateral joint hypertrophy impinging on the ventral thecal sac with right greater than left neural foraminal narrowing with encroachment possible impingement on the right exiting C6 nerve root. No significant change. 4. C6-C7: Disc bulge with superimposed central disc herniation impinging on the ventral thecal sac. 5. Straightening of the normal cervical lordosis which may be due to muscle spasm.

## 2025-01-22 NOTE — PATIENT PROFILE ADULT - HAVE YOU BEEN EATING POORLY BECAUSE OF A DECREASED APPETITE?
Rx Refill Note  Requested Prescriptions     Pending Prescriptions Disp Refills    Incontinence Supplies misc       Sig: Take As Directed.    aspirin 81 MG chewable tablet 90 tablet 1     Sig: Chew 2 tablets Daily.    glucose blood test strip 100 each 0     Sig: Use as instructed    ibuprofen (ADVIL,MOTRIN) 800 MG tablet 60 tablet 1     Sig: Take 1 tablet by mouth Every 12 (Twelve) Hours As Needed for Moderate Pain.    Accu-Chek FastClix Lancets misc 102 each 2     Sig: Inject 1 each under the skin into the appropriate area as directed 3 (Three) Times a Day. Check fasting glucose every morning and as needed    sertraline (ZOLOFT) 100 MG tablet 90 tablet 1     Sig: Take 1 tablet by mouth Daily.    busPIRone (BUSPAR) 15 MG tablet 90 tablet 2     Sig: Take 1 tablet by mouth 3 (Three) Times a Day.    eszopiclone (Lunesta) 2 MG tablet 30 tablet 0     Sig: Take 1 tablet by mouth Every Night. Take immediately before bedtime    isosorbide mononitrate (IMDUR) 30 MG 24 hr tablet 90 tablet 0     Sig: Take 1 tablet by mouth Daily.    prazosin (MINIPRESS) 1 MG capsule 90 capsule 0     Sig: Take 1 capsule by mouth Every Night.    gabapentin (NEURONTIN) 100 MG capsule       Sig: Take 1 capsule by mouth Daily.    atorvastatin (LIPITOR) 40 MG tablet 100 tablet 0     Sig: Take 1 tablet by mouth every night at bedtime.    albuterol sulfate  (90 Base) MCG/ACT inhaler 34 g 1      Last office visit with prescribing clinician: 8/16/2024   Last telemedicine visit with prescribing clinician: Visit date not found   Next office visit with prescribing clinician: 2/5/2025       UDS    None on file  CSA    None on file    INSPECT - SCAN - INSPECT/ [facility] [place]/ 01/22/2025 (01/22/2025)     Stefanie Sawant MA  01/22/25, 16:07 EST  
No (0)

## 2025-02-06 NOTE — DISCHARGE NOTE NURSING/CASE MANAGEMENT/SOCIAL WORK - NSDPFAC_GEN_ALL_CORE
Render Risk Assessment In Note?: no
Additional Notes: No vision changes or ocular symptoms. Mild radiating sinus type headache; off and on for a few weeks.
Detail Level: Simple
[facility]

## 2025-06-10 NOTE — BH CONSULTATION LIAISON ASSESSMENT NOTE - DOMICILE TYPE
You can access the FollowMyHealth Patient Portal offered by [facility] by registering at the following website: [email or web address]. By joining [facility]’s FollowMyHealth portal, you will also be able to view your health information using other applications (apps) compatible with our system.
Private Residence

## 2025-06-19 NOTE — PROGRESS NOTE ADULT - ASSESSMENT
58-year-old patient with past medical history for  coronary artery disease, Lyme disease, WPW status post ablation presented to the emergency department on 1/25/24  for left- sided numbness. Patient initially reports that she also has been having chest discomfort. Her left-sided numbness for the last several weeks.  However upon further questioning patient now states that the symptoms have been  becoming worse over the last 2 days.  Patient went and saw cardiology yesterday and recommended to go to the ER for further evaluation.      Left sided numbness ruled out CVA , h/o Lyme disease   - CT head and MR head - neg  - troponinx2 neg, 2 d echo - neg , no events   - TSH, B12 wnl, check folate, vit D wnl , CESAR -pending, CRP, ESR wnl  - c/w  aspirin and statin  - Neurology consult - could be a peripheral neuropathy, including brachial plexopathy, but she does not have any complaint of pain.    Will need further workup as an outpatient, including EMG and possible cervical spine imaging.   No further inpatient neurology recommendations at this time.   - Cardiology consult - Nonobstructive CAD,  on coronary angiogram from 2015, h/o WPW status post ablation - o/p NST   -  PT/OT/Speech eval  - MR of the spine to r/o acute pathology    h/o Lyme with  numbness , + positive urine culture with Enterococcus   Antibiotics allergies  - tick panel, [place], ID consult - ? Lyme disease   - f/u urine culture sensitivities  - check CRP, ESR, procalcitonin    Atypical Chest pain ruled out ACS   Non-obstructive CAD   -troponin -neg, no acute EKG change  -cardiology consult - o/p NST   - carotid doppler - ? stenosis reevaluation    SCD for DVT ppx    Code status -full code     Dispo - ID consult, fu labs, d/c planning MRI , carotid doppler pending    
58-year-old patient with PMHx of  coronary artery disease, spasmodic dysphonia, cervical dystonia, distal peripheral neuropathy since her lyme disease diagnosis in the 1990's, HTN, Lyme disease, WPW status post ablation presented to the emergency department for left- sided numbness, onset last several weeks. She follows with Neurologist Dr. Curtis. CT head, CTA/CTP is neg for acute bleed, ischemia, LVO    MRI brain revealed no acute infarct or abnormal FLAIR hyperintensities.  Vitamin B12: 629, TSH: 1.43    # Transient weakness / involuntary abnormal movements and paresthesias - with non focal exam. Could be peripheral neuropathy, Small fiber neuropathy or some form of movement disorder - given history of spasmodic dysphonia and cervical dystonia.    - I reassured the patient that she can have a consultation with movement disorder specialist.  She also needs EMG/NCV studies of upper and lower extremities to rule out peripheral neuropathy/myopathy/radiculopathy.    - No further testing from neurological perspective is deemed necessary at this time, may continue PT OT.    Above was discussed with the patient and Dr. Dubinsky.    Call neuro if needed henceforth
58-year-old patient with past medical history for  coronary artery disease, Lyme disease, WPW status post ablation presented to the emergency department on 1/25/24  for left- sided numbness. Patient initially reports that she also has been having chest discomfort. Her left-sided numbness for the last several weeks.  However upon further questioning patient now states that the symptoms have been  becoming worse over the last 2 days.  Patient went and saw cardiology yesterday and recommended to go to the ER for further evaluation.      Left sided numbness ruled out CVA , h/o Lyme disease   - CT head and MR head - neg  - troponinx2 neg, 2 d echo - neg , no events  - TSH, B12 wnl, check folate, vit D , CESAR, CRP, ESR  - c/w  aspirin and statin  - Neurology consult - could be a peripheral neuropathy, including brachial plexopathy, but she does not have any complaint of pain.    Will need further workup as an outpatient, including EMG and possible cervical spine imaging.   No further inpatient neurology recommendations at this time.   - Cardiology consult - Nonobstructive CAD,  on coronary angiogram from 2015, h/o WPW status post ablation - o/p NST   -  PT/OT/Speech eval    h/o Lyme with  numbness , + positive urine culture with Enterococcus   Antibiotics allergies  - tick panel, [place], ID consult - ? Lyme disease   - f/u urine culture sensitivities  - check CRP, ESR, procalcitonin    Atypical Chest pain ruled out ACS   Non-obstructive CAD   -troponin -neg, no acute EKG change  -cardiology consult - o/p NST     SCD for DVT ppx    Code status -full code     Dispo - ID consult, fu labs, d/c planning    
PROVIDER:[TOKEN:[[id number]]]